# Patient Record
Sex: FEMALE | Race: WHITE | NOT HISPANIC OR LATINO | Employment: UNEMPLOYED | URBAN - METROPOLITAN AREA
[De-identification: names, ages, dates, MRNs, and addresses within clinical notes are randomized per-mention and may not be internally consistent; named-entity substitution may affect disease eponyms.]

---
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
## 2019-03-12 ENCOUNTER — TELEPHONE (OUTPATIENT)
Dept: FAMILY MEDICINE CLINIC | Facility: CLINIC | Age: 19
End: 2019-03-12

## 2019-03-12 NOTE — TELEPHONE ENCOUNTER
Pt would like to start OB services with [facility]/ Pt is 22 weeks pregnant   She was being seen for [facility] services by [facility] [place] [address]

## 2019-03-26 ENCOUNTER — TELEPHONE (OUTPATIENT)
Dept: FAMILY MEDICINE CLINIC | Facility: CLINIC | Age: 19
End: 2019-03-26

## 2019-04-02 ENCOUNTER — TELEPHONE (OUTPATIENT)
Dept: FAMILY MEDICINE CLINIC | Facility: CLINIC | Age: 19
End: 2019-04-02

## 2019-04-17 ENCOUNTER — HOSPITAL ENCOUNTER (EMERGENCY)
Facility: HOSPITAL | Age: 19
Discharge: HOME | End: 2019-04-17
Attending: EMERGENCY MEDICINE
Payer: COMMERCIAL

## 2019-04-17 VITALS
RESPIRATION RATE: 20 BRPM | OXYGEN SATURATION: 100 % | DIASTOLIC BLOOD PRESSURE: 59 MMHG | TEMPERATURE: 97.7 F | HEART RATE: 80 BPM | SYSTOLIC BLOOD PRESSURE: 131 MMHG

## 2019-04-17 DIAGNOSIS — Z34.90 PREGNANCY: ICD-10-CM

## 2019-04-17 DIAGNOSIS — R10.2 PAIN OF ROUND LIGAMENT DURING PREGNANCY: Primary | ICD-10-CM

## 2019-04-17 DIAGNOSIS — O26.899 PAIN OF ROUND LIGAMENT DURING PREGNANCY: Primary | ICD-10-CM

## 2019-04-17 LAB
BILIRUB UR QL STRIP: NEGATIVE
CLARITY UR: ABNORMAL
COLOR UR: YELLOW
GLUCOSE SERPL-MCNC: 99 MG/DL (ref 65–140)
GLUCOSE UR STRIP-MCNC: ABNORMAL MG/DL
HGB UR QL STRIP.AUTO: NEGATIVE
KETONES UR STRIP-MCNC: NEGATIVE MG/DL
LEUKOCYTE ESTERASE UR QL STRIP: NEGATIVE
NITRITE UR QL STRIP: NEGATIVE
PH UR STRIP.AUTO: 6.5 [PH]
PROT UR STRIP-MCNC: NEGATIVE MG/DL
SP GR UR STRIP.AUTO: 1.02 (ref 1–1.03)
UROBILINOGEN UR QL STRIP.AUTO: 0.2 E.U./DL

## 2019-04-17 PROCEDURE — 99284 EMERGENCY DEPT VISIT MOD MDM: CPT

## 2019-04-17 PROCEDURE — 87086 URINE CULTURE/COLONY COUNT: CPT | Performed by: EMERGENCY MEDICINE

## 2019-04-17 PROCEDURE — 81003 URINALYSIS AUTO W/O SCOPE: CPT | Performed by: EMERGENCY MEDICINE

## 2019-04-17 PROCEDURE — 82948 REAGENT STRIP/BLOOD GLUCOSE: CPT

## 2019-04-18 ENCOUNTER — VBI (OUTPATIENT)
Dept: FAMILY MEDICINE CLINIC | Facility: CLINIC | Age: 19
End: 2019-04-18

## 2019-04-18 ENCOUNTER — TELEPHONE (OUTPATIENT)
Dept: FAMILY MEDICINE CLINIC | Facility: CLINIC | Age: 19
End: 2019-04-18

## 2019-04-19 ENCOUNTER — TELEPHONE (OUTPATIENT)
Dept: FAMILY MEDICINE CLINIC | Facility: CLINIC | Age: 19
End: 2019-04-19

## 2019-04-19 LAB — BACTERIA UR CULT: NORMAL

## 2019-04-22 ENCOUNTER — TELEPHONE (OUTPATIENT)
Dept: FAMILY MEDICINE CLINIC | Facility: CLINIC | Age: 19
End: 2019-04-22

## 2019-04-25 ENCOUNTER — INITIAL PRENATAL (OUTPATIENT)
Dept: FAMILY MEDICINE CLINIC | Facility: CLINIC | Age: 19
End: 2019-04-25
Payer: COMMERCIAL

## 2019-04-25 DIAGNOSIS — Z3A.28 28 WEEKS GESTATION OF PREGNANCY: Primary | ICD-10-CM

## 2019-04-25 PROCEDURE — 59426 ANTEPARTUM CARE ONLY: CPT | Performed by: FAMILY MEDICINE

## 2019-04-27 ENCOUNTER — APPOINTMENT (OUTPATIENT)
Dept: LAB | Facility: HOSPITAL | Age: 19
End: 2019-04-27
Attending: FAMILY MEDICINE
Payer: COMMERCIAL

## 2019-04-27 ENCOUNTER — TRANSCRIBE ORDERS (OUTPATIENT)
Dept: ADMINISTRATIVE | Facility: HOSPITAL | Age: 19
End: 2019-04-27

## 2019-04-27 DIAGNOSIS — Z3A.28 28 WEEKS GESTATION OF PREGNANCY: Primary | ICD-10-CM

## 2019-04-27 LAB
ABO GROUP BLD: NORMAL
AMPHETAMINES SERPL QL SCN: NEGATIVE
BARBITURATES UR QL: NEGATIVE
BASOPHILS # BLD AUTO: 0.03 THOUSANDS/ΜL (ref 0–0.1)
BASOPHILS NFR BLD AUTO: 0 % (ref 0–1)
BENZODIAZ UR QL: NEGATIVE
BILIRUB UR QL STRIP: NEGATIVE
BLD GP AB SCN SERPL QL: NEGATIVE
CLARITY UR: CLEAR
COCAINE UR QL: NEGATIVE
COLOR UR: NORMAL
EOSINOPHIL # BLD AUTO: 0.08 THOUSAND/ΜL (ref 0–0.61)
EOSINOPHIL NFR BLD AUTO: 1 % (ref 0–6)
ERYTHROCYTE [DISTWIDTH] IN BLOOD BY AUTOMATED COUNT: 13.2 % (ref 11.6–15.1)
GLUCOSE 1H P 50 G GLC PO SERPL-MCNC: 121 MG/DL
GLUCOSE UR STRIP-MCNC: NEGATIVE MG/DL
HCT VFR BLD AUTO: 34.8 % (ref 34.8–46.1)
HGB BLD-MCNC: 11.2 G/DL (ref 11.5–15.4)
HGB UR QL STRIP.AUTO: NEGATIVE
IMM GRANULOCYTES # BLD AUTO: 0.04 THOUSAND/UL (ref 0–0.2)
IMM GRANULOCYTES NFR BLD AUTO: 1 % (ref 0–2)
KETONES UR STRIP-MCNC: NEGATIVE MG/DL
LEUKOCYTE ESTERASE UR QL STRIP: NEGATIVE
LYMPHOCYTES # BLD AUTO: 1.61 THOUSANDS/ΜL (ref 0.6–4.47)
LYMPHOCYTES NFR BLD AUTO: 20 % (ref 14–44)
MCH RBC QN AUTO: 30 PG (ref 26.8–34.3)
MCHC RBC AUTO-ENTMCNC: 32.2 G/DL (ref 31.4–37.4)
MCV RBC AUTO: 93 FL (ref 82–98)
METHADONE UR QL: NEGATIVE
MONOCYTES # BLD AUTO: 0.27 THOUSAND/ΜL (ref 0.17–1.22)
MONOCYTES NFR BLD AUTO: 3 % (ref 4–12)
NEUTROPHILS # BLD AUTO: 5.89 THOUSANDS/ΜL (ref 1.85–7.62)
NEUTS SEG NFR BLD AUTO: 75 % (ref 43–75)
NITRITE UR QL STRIP: NEGATIVE
NRBC BLD AUTO-RTO: 0 /100 WBCS
OPIATES UR QL SCN: NEGATIVE
PCP UR QL: NEGATIVE
PH UR STRIP.AUTO: 7 [PH]
PLATELET # BLD AUTO: 216 THOUSANDS/UL (ref 149–390)
PMV BLD AUTO: 10.5 FL (ref 8.9–12.7)
PROT UR STRIP-MCNC: NEGATIVE MG/DL
RBC # BLD AUTO: 3.73 MILLION/UL (ref 3.81–5.12)
RH BLD: POSITIVE
RUBV IGG SERPL IA-ACNC: >175 IU/ML
SP GR UR STRIP.AUTO: 1.01 (ref 1–1.03)
SPECIMEN EXPIRATION DATE: NORMAL
THC UR QL: NEGATIVE
TSH SERPL DL<=0.05 MIU/L-ACNC: 0.75 UIU/ML (ref 0.46–3.98)
UROBILINOGEN UR QL STRIP.AUTO: 0.2 E.U./DL
WBC # BLD AUTO: 7.92 THOUSAND/UL (ref 4.31–10.16)

## 2019-04-27 PROCEDURE — 82950 GLUCOSE TEST: CPT

## 2019-04-27 PROCEDURE — 80081 OBSTETRIC PANEL INC HIV TSTG: CPT

## 2019-04-27 PROCEDURE — 84443 ASSAY THYROID STIM HORMONE: CPT

## 2019-04-27 PROCEDURE — 83020 HEMOGLOBIN ELECTROPHORESIS: CPT

## 2019-04-27 PROCEDURE — 81003 URINALYSIS AUTO W/O SCOPE: CPT

## 2019-04-27 PROCEDURE — 80307 DRUG TEST PRSMV CHEM ANLYZR: CPT

## 2019-04-27 PROCEDURE — 36415 COLL VENOUS BLD VENIPUNCTURE: CPT

## 2019-04-28 LAB — HBV SURFACE AG SER QL: NORMAL

## 2019-04-29 LAB
HIV 1+2 AB+HIV1 P24 AG SERPL QL IA: NORMAL
RPR SER QL: NORMAL

## 2019-04-30 ENCOUNTER — INITIAL PRENATAL (OUTPATIENT)
Dept: FAMILY MEDICINE CLINIC | Facility: CLINIC | Age: 19
End: 2019-04-30
Payer: COMMERCIAL

## 2019-04-30 VITALS
DIASTOLIC BLOOD PRESSURE: 52 MMHG | BODY MASS INDEX: 34.11 KG/M2 | SYSTOLIC BLOOD PRESSURE: 114 MMHG | WEIGHT: 192.56 LBS

## 2019-04-30 DIAGNOSIS — Z3A.28 28 WEEKS GESTATION OF PREGNANCY: Primary | ICD-10-CM

## 2019-04-30 LAB
DEPRECATED HGB OTHER BLD-IMP: 0 %
HGB A MFR BLD: 1.9 % (ref 1.8–3.2)
HGB A MFR BLD: 98.1 % (ref 96.4–98.8)
HGB C MFR BLD: 0 %
HGB F MFR BLD: 0 % (ref 0–2)
HGB FRACT BLD-IMP: NORMAL
HGB S BLD QL SOLY: NEGATIVE
HGB S MFR BLD: 0 %

## 2019-04-30 PROCEDURE — 59426 ANTEPARTUM CARE ONLY: CPT | Performed by: FAMILY MEDICINE

## 2019-04-30 PROCEDURE — 80307 DRUG TEST PRSMV CHEM ANLYZR: CPT | Performed by: STUDENT IN AN ORGANIZED HEALTH CARE EDUCATION/TRAINING PROGRAM

## 2019-04-30 PROCEDURE — 87591 N.GONORRHOEAE DNA AMP PROB: CPT | Performed by: STUDENT IN AN ORGANIZED HEALTH CARE EDUCATION/TRAINING PROGRAM

## 2019-04-30 PROCEDURE — 87491 CHLMYD TRACH DNA AMP PROBE: CPT | Performed by: STUDENT IN AN ORGANIZED HEALTH CARE EDUCATION/TRAINING PROGRAM

## 2019-04-30 PROCEDURE — 87070 CULTURE OTHR SPECIMN AEROBIC: CPT | Performed by: STUDENT IN AN ORGANIZED HEALTH CARE EDUCATION/TRAINING PROGRAM

## 2019-05-02 LAB
AMPHETAMINES UR QL SCN: NEGATIVE NG/ML
BACTERIA GENITAL AEROBE CULT: NORMAL
BARBITURATES UR QL SCN: NEGATIVE NG/ML
BENZODIAZ UR QL: NEGATIVE NG/ML
BZE UR QL: NEGATIVE NG/ML
C TRACH DNA SPEC QL NAA+PROBE: NEGATIVE
CANNABINOIDS UR QL SCN: NEGATIVE NG/ML
METHADONE UR QL SCN: NEGATIVE NG/ML
N GONORRHOEA DNA SPEC QL NAA+PROBE: NEGATIVE
OPIATES UR QL: NEGATIVE NG/ML
PCP UR QL: NEGATIVE NG/ML
PROPOXYPH UR QL SCN: NEGATIVE NG/ML

## 2019-05-14 ENCOUNTER — ROUTINE PRENATAL (OUTPATIENT)
Dept: FAMILY MEDICINE CLINIC | Facility: CLINIC | Age: 19
End: 2019-05-14

## 2019-05-14 VITALS
SYSTOLIC BLOOD PRESSURE: 110 MMHG | RESPIRATION RATE: 18 BRPM | BODY MASS INDEX: 34.9 KG/M2 | HEART RATE: 84 BPM | DIASTOLIC BLOOD PRESSURE: 64 MMHG | OXYGEN SATURATION: 99 % | WEIGHT: 197 LBS

## 2019-05-14 DIAGNOSIS — R81 GLUCOSURIA: ICD-10-CM

## 2019-05-14 DIAGNOSIS — Z3A.31 31 WEEKS GESTATION OF PREGNANCY: Primary | ICD-10-CM

## 2019-05-14 PROCEDURE — 0502F SUBSEQUENT PRENATAL CARE: CPT | Performed by: FAMILY MEDICINE

## 2019-05-21 ENCOUNTER — ROUTINE PRENATAL (OUTPATIENT)
Dept: PERINATAL CARE | Facility: CLINIC | Age: 19
End: 2019-05-21
Payer: COMMERCIAL

## 2019-05-21 VITALS
HEIGHT: 65 IN | HEART RATE: 89 BPM | BODY MASS INDEX: 32.99 KG/M2 | SYSTOLIC BLOOD PRESSURE: 118 MMHG | DIASTOLIC BLOOD PRESSURE: 87 MMHG | WEIGHT: 198 LBS

## 2019-05-21 DIAGNOSIS — O99.210 OBESITY AFFECTING PREGNANCY, ANTEPARTUM: Primary | ICD-10-CM

## 2019-05-21 DIAGNOSIS — Z3A.32 32 WEEKS GESTATION OF PREGNANCY: ICD-10-CM

## 2019-05-21 PROCEDURE — 76811 OB US DETAILED SNGL FETUS: CPT | Performed by: OBSTETRICS & GYNECOLOGY

## 2019-05-21 PROCEDURE — 99241 PR OFFICE CONSULTATION NEW/ESTAB PATIENT 15 MIN: CPT | Performed by: OBSTETRICS & GYNECOLOGY

## 2019-05-30 ENCOUNTER — ROUTINE PRENATAL (OUTPATIENT)
Dept: FAMILY MEDICINE CLINIC | Facility: CLINIC | Age: 19
End: 2019-05-30
Payer: COMMERCIAL

## 2019-05-30 VITALS — DIASTOLIC BLOOD PRESSURE: 58 MMHG | BODY MASS INDEX: 33.78 KG/M2 | SYSTOLIC BLOOD PRESSURE: 98 MMHG | WEIGHT: 203 LBS

## 2019-05-30 DIAGNOSIS — O99.210 OBESITY AFFECTING PREGNANCY, ANTEPARTUM: ICD-10-CM

## 2019-05-30 DIAGNOSIS — Z23 ENCOUNTER FOR IMMUNIZATION: ICD-10-CM

## 2019-05-30 DIAGNOSIS — Z3A.33 33 WEEKS GESTATION OF PREGNANCY: Primary | ICD-10-CM

## 2019-05-30 PROCEDURE — 59426 ANTEPARTUM CARE ONLY: CPT | Performed by: FAMILY MEDICINE

## 2019-05-31 LAB
AMPHETAMINES UR QL SCN: NEGATIVE NG/ML
BARBITURATES UR QL SCN: NEGATIVE NG/ML
BENZODIAZ UR QL: NEGATIVE NG/ML
BZE UR QL: NEGATIVE NG/ML
CANNABINOIDS UR QL SCN: NEGATIVE NG/ML
METHADONE UR QL SCN: NEGATIVE NG/ML
OPIATES UR QL: NEGATIVE NG/ML
PCP UR QL: NEGATIVE NG/ML
PROPOXYPH UR QL SCN: NEGATIVE NG/ML

## 2019-05-31 PROCEDURE — 90715 TDAP VACCINE 7 YRS/> IM: CPT | Performed by: FAMILY MEDICINE

## 2019-05-31 PROCEDURE — 90471 IMMUNIZATION ADMIN: CPT | Performed by: FAMILY MEDICINE

## 2019-06-04 LAB
GLUCOSE 1H P 100 G GLC PO SERPL-MCNC: 123 MG/DL (ref 65–179)
GLUCOSE 2H P 100 G GLC PO SERPL-MCNC: 115 MG/DL (ref 65–154)
GLUCOSE 3H P 100 G GLC PO SERPL-MCNC: 68 MG/DL (ref 65–139)
GLUCOSE P FAST SERPL-MCNC: 70 MG/DL (ref 65–94)
SL AMB NOTE:: (no result)

## 2019-06-13 ENCOUNTER — DOCUMENTATION (OUTPATIENT)
Dept: FAMILY MEDICINE CLINIC | Facility: CLINIC | Age: 19
End: 2019-06-13

## 2019-06-13 ENCOUNTER — ROUTINE PRENATAL (OUTPATIENT)
Dept: FAMILY MEDICINE CLINIC | Facility: CLINIC | Age: 19
End: 2019-06-13

## 2019-06-13 VITALS — WEIGHT: 209 LBS | DIASTOLIC BLOOD PRESSURE: 48 MMHG | BODY MASS INDEX: 34.78 KG/M2 | SYSTOLIC BLOOD PRESSURE: 96 MMHG

## 2019-06-13 DIAGNOSIS — Z02.83 ENCOUNTER FOR DRUG SCREENING: ICD-10-CM

## 2019-06-13 DIAGNOSIS — O99.820 GBS (GROUP B STREPTOCOCCUS CARRIER), +RV CULTURE, CURRENTLY PREGNANT: Primary | ICD-10-CM

## 2019-06-13 DIAGNOSIS — Z34.83 ENCOUNTER FOR SUPERVISION OF OTHER NORMAL PREGNANCY IN THIRD TRIMESTER: ICD-10-CM

## 2019-06-13 PROCEDURE — 0502F SUBSEQUENT PRENATAL CARE: CPT | Performed by: FAMILY MEDICINE

## 2019-06-18 LAB
BACTERIA GENITAL AEROBE CULT: NORMAL
Lab: NORMAL

## 2019-06-20 ENCOUNTER — ULTRASOUND (OUTPATIENT)
Dept: PERINATAL CARE | Facility: CLINIC | Age: 19
End: 2019-06-20
Payer: COMMERCIAL

## 2019-06-20 VITALS
SYSTOLIC BLOOD PRESSURE: 106 MMHG | HEIGHT: 62 IN | DIASTOLIC BLOOD PRESSURE: 69 MMHG | BODY MASS INDEX: 38.35 KG/M2 | HEART RATE: 92 BPM | WEIGHT: 208.4 LBS

## 2019-06-20 DIAGNOSIS — O99.210 OBESITY AFFECTING PREGNANCY, ANTEPARTUM: Primary | ICD-10-CM

## 2019-06-20 DIAGNOSIS — Z3A.36 36 WEEKS GESTATION OF PREGNANCY: ICD-10-CM

## 2019-06-20 PROCEDURE — 76816 OB US FOLLOW-UP PER FETUS: CPT | Performed by: OBSTETRICS & GYNECOLOGY

## 2019-06-21 ENCOUNTER — TELEPHONE (OUTPATIENT)
Dept: OTHER | Facility: OTHER | Age: 19
End: 2019-06-21

## 2019-06-21 ENCOUNTER — HOSPITAL ENCOUNTER (OUTPATIENT)
Facility: HOSPITAL | Age: 19
Discharge: HOME | End: 2019-06-21
Attending: FAMILY MEDICINE | Admitting: FAMILY MEDICINE
Payer: COMMERCIAL

## 2019-06-21 VITALS
SYSTOLIC BLOOD PRESSURE: 113 MMHG | RESPIRATION RATE: 18 BRPM | BODY MASS INDEX: 38.04 KG/M2 | DIASTOLIC BLOOD PRESSURE: 65 MMHG | TEMPERATURE: 99.3 F | HEART RATE: 96 BPM | WEIGHT: 208 LBS

## 2019-06-21 PROBLEM — Z3A.30 30 WEEKS GESTATION OF PREGNANCY: Status: ACTIVE | Noted: 2019-06-21

## 2019-06-21 PROBLEM — Z3A.36 36 WEEKS GESTATION OF PREGNANCY: Status: ACTIVE | Noted: 2019-06-21

## 2019-06-21 PROCEDURE — 99214 OFFICE O/P EST MOD 30 MIN: CPT

## 2019-06-21 PROCEDURE — NC001 PR NO CHARGE: Performed by: STUDENT IN AN ORGANIZED HEALTH CARE EDUCATION/TRAINING PROGRAM

## 2019-06-25 ENCOUNTER — ROUTINE PRENATAL (OUTPATIENT)
Dept: FAMILY MEDICINE CLINIC | Facility: CLINIC | Age: 19
End: 2019-06-25
Payer: COMMERCIAL

## 2019-06-25 ENCOUNTER — PATIENT OUTREACH (OUTPATIENT)
Dept: FAMILY MEDICINE CLINIC | Facility: CLINIC | Age: 19
End: 2019-06-25

## 2019-06-25 VITALS — BODY MASS INDEX: 38.04 KG/M2 | SYSTOLIC BLOOD PRESSURE: 98 MMHG | DIASTOLIC BLOOD PRESSURE: 42 MMHG | WEIGHT: 208 LBS

## 2019-06-25 DIAGNOSIS — Z3A.36 36 WEEKS GESTATION OF PREGNANCY: Primary | ICD-10-CM

## 2019-06-25 DIAGNOSIS — F32.A DEPRESSION, UNSPECIFIED DEPRESSION TYPE: Primary | ICD-10-CM

## 2019-06-25 PROCEDURE — 59426 ANTEPARTUM CARE ONLY: CPT | Performed by: FAMILY MEDICINE

## 2019-06-27 LAB — GP B STREP DNA SPEC QL NAA+PROBE: NEGATIVE

## 2019-07-02 ENCOUNTER — ROUTINE PRENATAL (OUTPATIENT)
Dept: FAMILY MEDICINE CLINIC | Facility: CLINIC | Age: 19
End: 2019-07-02
Payer: COMMERCIAL

## 2019-07-02 VITALS — WEIGHT: 205 LBS | DIASTOLIC BLOOD PRESSURE: 48 MMHG | SYSTOLIC BLOOD PRESSURE: 100 MMHG | BODY MASS INDEX: 37.49 KG/M2

## 2019-07-02 DIAGNOSIS — Z3A.38 38 WEEKS GESTATION OF PREGNANCY: Primary | ICD-10-CM

## 2019-07-02 PROCEDURE — 59426 ANTEPARTUM CARE ONLY: CPT | Performed by: FAMILY MEDICINE

## 2019-07-03 PROBLEM — Z3A.38 38 WEEKS GESTATION OF PREGNANCY: Status: ACTIVE | Noted: 2019-06-21

## 2019-07-03 NOTE — PROGRESS NOTES
Flavia Delatorre is a 20 y o  female being seen today for her obstetrical visit  She is at 38w1d gestation  Patient reports good fetal movement, no vaginal discharge, loss of fluid, vaginal bleeding  Reports infrequent contractions  Menstrual History:  OB History        2    Para   1    Term   1       0    AB   0    Living   1       SAB   0    TAB   0    Ectopic   0    Multiple   0    Live Births   1                     The following portions of the patient's history were reviewed and updated as appropriate: allergies, current medications, past family history, past medical history, past social history, past surgical history and problem list     Review of Systems  Pertinent items are noted in HPI       Objective     BP (!) 100/48   Wt 93 kg (205 lb)   LMP 10/09/2018 (Approximate)   BMI 37 49 kg/m²   FHT: 135 BPM   Uterine Size: size equals dates   Presentations: unsure   Pelvic Exam:     Dilation:     Effacement:     Station:      Consistency:     Position:         Assessment     at 38w   Plan     Plans for delivery:  planned  GBS negative  Plan for weekly NST and AMERICO after 40 weeks If not already delivered  Continue with routine parental care, return in 1 week

## 2019-07-06 ENCOUNTER — TELEPHONE (OUTPATIENT)
Dept: OTHER | Facility: OTHER | Age: 19
End: 2019-07-06

## 2019-07-06 ENCOUNTER — HOSPITAL ENCOUNTER (OUTPATIENT)
Facility: HOSPITAL | Age: 19
Discharge: HOME | End: 2019-07-07
Attending: FAMILY MEDICINE | Admitting: FAMILY MEDICINE
Payer: COMMERCIAL

## 2019-07-06 VITALS
DIASTOLIC BLOOD PRESSURE: 57 MMHG | TEMPERATURE: 98.7 F | RESPIRATION RATE: 18 BRPM | HEART RATE: 88 BPM | SYSTOLIC BLOOD PRESSURE: 130 MMHG

## 2019-07-06 PROCEDURE — NC001 PR NO CHARGE: Performed by: STUDENT IN AN ORGANIZED HEALTH CARE EDUCATION/TRAINING PROGRAM

## 2019-07-06 RX ORDER — OXYCODONE HYDROCHLORIDE AND ACETAMINOPHEN 5; 325 MG/1; MG/1
1 TABLET ORAL EVERY 4 HOURS PRN
Status: DISCONTINUED | OUTPATIENT
Start: 2019-07-06 | End: 2019-07-07 | Stop reason: HOSPADM

## 2019-07-07 PROCEDURE — 99214 OFFICE O/P EST MOD 30 MIN: CPT

## 2019-07-07 NOTE — DISCHARGE INSTRUCTIONS
Pregnancy at 28 to 38 Weeks   AMBULATORY CARE:   What changes are happening to your body: You are considered full term at the beginning of 37 weeks  Your breathing may be easier if your baby has moved down into a head-down position  You may need to urinate more often because the baby may be pressing on your bladder  You may also feel more discomfort and get tired easily  Seek care immediately if:   · You develop a severe headache that does not go away  · You have new or increased vision changes, such as blurred or spotted vision  · You have new or increased swelling in your face or hands  · You have vaginal spotting or bleeding  · Your water broke or you feel warm water gushing or trickling from your vagina  Contact your healthcare provider if:   · You have more than 5 contractions in 1 hour  · You notice any changes in your baby's movements  · You have abdominal cramps, pressure, or tightening  · You have a change in vaginal discharge  · You have chills or a fever  · You have vaginal itching, burning, or pain  · You have yellow, green, white, or foul-smelling vaginal discharge  · You have pain or burning when you urinate, less urine than usual, or pink or bloody urine  · You have questions or concerns about your condition or care  How to care for yourself at this stage of your pregnancy:   · Eat a variety of healthy foods  Healthy foods include fruits, vegetables, whole-grain breads, low-fat dairy foods, beans, lean meats, and fish  Drink liquids as directed  Ask how much liquid to drink each day and which liquids are best for you  Limit caffeine to less than 200 milligrams each day  Limit your intake of fish to 2 servings each week  Choose fish low in mercury such as canned light tuna, shrimp, crab, salmon, cod, or tilapia  Do not  eat fish high in mercury such as swordfish, tilefish, viry mackerel, and shark  · Take prenatal vitamins as directed    Your need for certain vitamins and minerals, such as folic acid, increases during pregnancy  Prenatal vitamins provide some of the extra vitamins and minerals you need  Prenatal vitamins may also help to decrease the risk of certain birth defects  · Rest as needed  Put your feet up if you have swelling in your ankles and feet  · Do not smoke  If you smoke, it is never too late to quit  Smoking increases your risk of a miscarriage and other health problems during your pregnancy  Smoking can cause your baby to be born too early or weigh less at birth  Ask your healthcare provider for information if you need help quitting  · Do not drink alcohol  Alcohol passes from your body to your baby through the placenta  It can affect your baby's brain development and cause fetal alcohol syndrome (FAS)  FAS is a group of conditions that causes mental, behavior, and growth problems  · Talk to your healthcare provider before you take any medicines  Many medicines may harm your baby if you take them when you are pregnant  Do not take any medicines, vitamins, herbs, or supplements without first talking to your healthcare provider  Never use illegal or street drugs (such as marijuana or cocaine) while you are pregnant  · Talk to your healthcare provider before you travel  You may not be able to travel in an airplane after 36 weeks  He may also recommend that you avoid long road trips  Safety tips during pregnancy:   · Avoid hot tubs and saunas  Do not use a hot tub or sauna while you are pregnant, especially during your first trimester  Hot tubs and saunas may raise your baby's temperature and increase the risk of birth defects  · Avoid toxoplasmosis  This is an infection caused by eating raw meat or being around infected cat feces  It can cause birth defects, miscarriages, and other problems  Wash your hands after you touch raw meat  Make sure any meat is well-cooked before you eat it   Avoid raw eggs and unpasteurized milk  Use gloves or ask someone else to clean your cat's litter box while you are pregnant  · Ask your healthcare provider about travel  The most comfortable time to travel is during the second trimester  Ask your healthcare provider if you can travel after 36 weeks  You may not be able to travel in an airplane after 36 weeks  He may also recommend that you avoid long road trips  Changes that are happening with your baby:  By 38 weeks, your baby may weigh between 6 and 9 pounds  Your baby may be about 14 inches long from the top of the head to the rump (baby's bottom)  Your baby hears well enough to know your voice  As your baby gets larger, you may feel fewer kicks and more stretching and rolling  Your baby may move into a head-down position  Your baby will also rest lower in your abdomen  What you need to know about prenatal care: Your healthcare provider will check your blood pressure and weight  You may also need the following:  · A urine test  may also be done to check for sugar and protein  These can be signs of gestational diabetes or infection  Protein in your urine may also be a sign of preeclampsia  Preeclampsia is a condition that can develop during week 20 or later of your pregnancy  It causes high blood pressure, and it can cause problems with your kidneys and other organs  · A blood test  may be done to check for anemia (low iron level)  · A Tdap vaccine  may be recommended by your healthcare provider  · A group B strep test  is a test that is done to check for group B strep infection  Group B strep is a type of bacteria that may be found in the vagina or rectum  It can be passed to your baby during delivery if you have it  Your healthcare provider will take swab your vagina or rectum and send the sample to the lab for tests  · Fundal height  is a measurement of your uterus to check your baby's growth   This number is usually the same as the number of weeks that you have been pregnant  Your healthcare provider may also check your baby's position  · Your baby's heart rate  will be checked  © 2017 [address] [employer] Information is for End User's use only and may not be sold, redistributed or otherwise used for commercial purposes  All illustrations and images included in CareNotes® are the copyrighted property of A D A M , Inc  or Anibal Martinez  The above information is an  only  It is not intended as medical advice for individual conditions or treatments  Talk to your doctor, nurse or pharmacist before following any medical regimen to see if it is safe and effective for you

## 2019-07-07 NOTE — PROGRESS NOTES
Triage Note - OB  Juve Diss 20 y o  female MRN: [id number]  Unit/Bed#: LD Triage 1 Encounter: [id number]    Chief Complaint:   Chief Complaint   Patient presents with    Contractions     JAIME: Estimated Date of Delivery: 19    HPI: 20 y o  female  at 38w3d presenting with pelvic pain since 5 PM today that has progressively worsened  Patient rated the pain 8/10  Patient denied vaginal bleeding or decrease in fetal movement  Patient noted some "leaks" on   FHR:130 bpm with 15x15 moderate accelerations; reactive   [place]: no contractions    Vitals: Blood pressure 130/57, pulse 88, temperature 98 7 °F (37 1 °C), temperature source Oral, resp  rate 18, last menstrual period 10/09/2018, not currently breastfeeding  ,There is no height or weight on file to calculate BMI  Physical Exam  GEN: well developed and well nourished, alert, oriented times 3 and appears comfortable  Heart: Regular rate and rhythm, S1S2 present or without murmur or extra heart sounds   Lungs: clear to auscultation bilaterally, no wheezes, no rhonchi and normal symmetric air entry  Abd: soft, non tender, positive bowel sounds and gravid  SVE: 1, 0, -5    Labs:   No visits with results within 1 Day(s) from this visit  Latest known visit with results is:   Routine Prenatal on 2019   Component Date Value    Strep Grp B NIKOLAY 2019 Negative        Lab, Imaging and other studies: I have personally reviewed pertinent reports  A/P: Patient is a 19 yo  38w5d presenting with pelvic pain most likely due to round ligament pain  -As per tocometry patient is not maxwell and the FHR is reactive and wnl  Percocet ordered but patient refused  Instructed patient to take Tylenol for pain    -Discharge instructions given to patient and labor precautions reviewed    -Plan of care discussed with Dr Saeid Rivero who agrees

## 2019-07-07 NOTE — TELEPHONE ENCOUNTER
Errol River 2000  CONFIDENTIALTY NOTICE: This fax transmission is intended only for the addressee  It contains information that is legally privileged,  confidential or otherwise protected from use or disclosure  If you are not the intended recipient, you are strictly prohibited from reviewing,  disclosing, copying using or disseminating any of this information or taking any action in reliance on or regarding this information  If you have  received this fax in error, please notify us immediately by telephone so that we can arrange for its return to us  Page:   Call Id: [id number]  Health Call  Standard Call Report  Health Call  Patient Name: Errol River  Gender: Female  : 2000  Age: 20 Y 3 M 4 D  Return Phone  Number: [phone] (Current)  Address: [address] [place] [address]  City/State/Zip: [address] [place] [id number]  Practice Name: [address] [place] [address]  Practice Charged:  Physician:  [facility] Name:  Relationship To  Patient: Self  Return Phone Number: [phone] (Current)  Presenting Problem: "I have pressure pain in my pelvic  area "  Service Type: Triage  Charged Service 1: [facility]  38  Name and  Number:  Nurse Assessment  Nurse: Marcell Stanton RN, Connor Galdamez Date/Time: 2019 8:57:21 PM  Type of assessment required:  ---General (Adult or Child)  Duration of Current S/S  ---Constant since 2019  Location/Radiation  ---Lower abd  , Vagina  Temperature (F) and route:  ---Denies  Symptom Specific Meds (Dose/Time):  ---None  Other S/S  ---Contractions are coming one after another, had some fluid leakage ,pain pressure  Pain Scale on scale of 1-10, 10 being the worst:  ---9/10  Symptom progression:  ---worse  Intake and Output  ---Lico Ozuna  LMP/ Pregnancy:  Errol River 2000  CONFIDENTIALTY NOTICE: This fax transmission is intended only for the addressee  It contains information that is legally privileged,  confidential or otherwise protected from use or disclosure   If you are not the intended recipient, you are strictly prohibited from reviewing,  disclosing, copying using or disseminating any of this information or taking any action in reliance on or regarding this information  If you have  received this fax in error, please notify us immediately by telephone so that we can arrange for its return to us  Page: 2 of 2  Call Id: [id number]  Nurse Assessment  ---38 weeks  Breastfeeding  ---No  Last Exam/Treatment:  ---7/2/2019 Prenatal check  Protocols  Protocol Title Nurse Date/Time  Pregnancy - Labor Myrna Mcgregor 7/6/2019 9:03:44 PM  Question Caller Affirmed  Disp  Time Disposition Final User  7/6/2019 9:06:25 PM Go to L&D Now TRICE Mcgregor, Catarina Rice  7/6/2019 9:07:53 PM RN Triaged Yes TRICE Espinosa, [facility] Advice Given Per Protocol  GO TO L&D NOW: You need to be seen  Go to the Labor and Delivery Unit or the Emergency Room at _\[facility]\""_________ [facility]  now  Drive carefully  NOTE TO TRIAGER - DRIVING: * Another adult should drive  * If immediate transportation is not available via  car or taxi, then the patient should be instructed to call [id number]  LEAKING FLUID: * Bring towel; you may wish to put it on the seat of  your car  Do not put anything into your vagina (e g , tampon)  (Reason: risk of infection if rupture of membranes) CARE ADVICE given  per Labor (Adult) guideline    Caller Understands: Yes  Caller Disagree/Comply: Comply  PreDisposition: Unsure

## 2019-07-11 ENCOUNTER — ROUTINE PRENATAL (OUTPATIENT)
Dept: FAMILY MEDICINE CLINIC | Facility: CLINIC | Age: 19
End: 2019-07-11

## 2019-07-11 VITALS — BODY MASS INDEX: 37.04 KG/M2 | DIASTOLIC BLOOD PRESSURE: 52 MMHG | WEIGHT: 202.5 LBS | SYSTOLIC BLOOD PRESSURE: 100 MMHG

## 2019-07-11 DIAGNOSIS — Z3A.39 39 WEEKS GESTATION OF PREGNANCY: Primary | ICD-10-CM

## 2019-07-11 PROCEDURE — 0502F SUBSEQUENT PRENATAL CARE: CPT | Performed by: FAMILY MEDICINE

## 2019-07-11 NOTE — PROGRESS NOTES
OB/GYN  PN Visit  Delano Donald  [id number]  2019  1:07 PM  Dr Howard Fisher, DO    S: 20 y o  [id number] 39w2d here for PN visit  She has no obstetric complaints, including pelvic pain, contractions, vaginal bleeding, loss of fluid, or decreased fetal movement  She presented to [facility] triage at [place] 19 for rule out labor and was discharged home       OB History        2    Para   1    Term   1       0    AB   0    Living   1       SAB   0    TAB   0    Ectopic   0    Multiple   0    Live Births   1                 O:  Vitals:    19 1137   BP: 100/52       Gen: no acute distress, nonlabored breathing  Abd: gravid, size=dates  FHT: 135          A/P:   at 39w2d doing well  -C/S with first delivery, desires , understands risks and is ultimately agreeable to proceeding with repeat C/S if needed for safety  -Labor precautions reviewed  -Return to office next week on Tuesday to discuss possible induction  -Discussed with Dr Zainab Booth DO  2019  1:07 PM

## 2019-07-16 ENCOUNTER — ROUTINE PRENATAL (OUTPATIENT)
Dept: FAMILY MEDICINE CLINIC | Facility: CLINIC | Age: 19
End: 2019-07-16

## 2019-07-16 VITALS — WEIGHT: 199 LBS | SYSTOLIC BLOOD PRESSURE: 98 MMHG | DIASTOLIC BLOOD PRESSURE: 52 MMHG | BODY MASS INDEX: 36.4 KG/M2

## 2019-07-16 DIAGNOSIS — Z34.83 ENCOUNTER FOR SUPERVISION OF OTHER NORMAL PREGNANCY IN THIRD TRIMESTER: Primary | ICD-10-CM

## 2019-07-16 PROCEDURE — 0502F SUBSEQUENT PRENATAL CARE: CPT | Performed by: FAMILY MEDICINE

## 2019-07-16 NOTE — PROGRESS NOTES
Alec Cintron is a 20 y o  female being seen today for her obstetrical visit  She is at 40w0d gestation  Patient reports no bleeding, no contractions and no LOF, endorses fetal movement  Fetal movement: normal     Menstrual History:  OB History        2    Para   1    Term   1       0    AB   0    Living   1       SAB   0    TAB   0    Ectopic   0    Multiple   0    Live Births   1                Menarche age:   Patient's last menstrual period was 10/09/2018 (approximate)  The following portions of the patient's history were reviewed and updated as appropriate: allergies, current medications, past family history, past medical history, past social history, past surgical history and problem list     Review of Systems  Pertinent items are noted in HPI  Objective        BP 98/52   Wt 90 3 kg (199 lb)   LMP 10/09/2018 (Approximate)   BMI 36 40 kg/m²   FHT:  136 BPM   Uterine Size: 39 cm   Presentations: cephalic   Pelvic Exam:     Dilation: 1cm    Effacement: 50%    Station: -3    Consistency: medium    Position: middle        Assessment     Pregnancy 40 and 0/7 weeks     Plan     labor precautions given  Postdates management: discussed fetal surveillance and induction  Induction: will call patient with time and date, likely next Tuesday,   Follow up on

## 2019-07-22 ENCOUNTER — ANESTHESIA (INPATIENT)
Dept: ANESTHESIOLOGY | Facility: HOSPITAL | Age: 19
DRG: 560 | End: 2019-07-22
Payer: COMMERCIAL

## 2019-07-22 ENCOUNTER — HOSPITAL ENCOUNTER (INPATIENT)
Facility: HOSPITAL | Age: 19
LOS: 3 days | Discharge: HOME | DRG: 560 | End: 2019-07-25
Attending: FAMILY MEDICINE | Admitting: OBSTETRICS & GYNECOLOGY
Payer: COMMERCIAL

## 2019-07-22 ENCOUNTER — ANESTHESIA EVENT (INPATIENT)
Dept: ANESTHESIOLOGY | Facility: HOSPITAL | Age: 19
DRG: 560 | End: 2019-07-22
Payer: COMMERCIAL

## 2019-07-22 DIAGNOSIS — Z3A.40 40 WEEKS GESTATION OF PREGNANCY: Primary | ICD-10-CM

## 2019-07-22 PROBLEM — O42.90 PROM (PREMATURE RUPTURE OF MEMBRANES): Status: ACTIVE | Noted: 2019-07-22

## 2019-07-22 PROBLEM — Z3A.33 33 WEEKS GESTATION OF PREGNANCY: Status: RESOLVED | Noted: 2019-05-21 | Resolved: 2019-07-22

## 2019-07-22 PROBLEM — Z86.59 HISTORY OF DEPRESSION: Status: ACTIVE | Noted: 2019-07-22

## 2019-07-22 PROBLEM — O99.820 GBS (GROUP B STREPTOCOCCUS CARRIER), +RV CULTURE, CURRENTLY PREGNANT: Status: RESOLVED | Noted: 2019-06-13 | Resolved: 2019-07-22

## 2019-07-22 PROBLEM — F19.11 HISTORY OF SUBSTANCE ABUSE (HCC): Status: ACTIVE | Noted: 2019-07-22

## 2019-07-22 PROBLEM — Z86.59 HISTORY OF ANXIETY: Status: ACTIVE | Noted: 2019-07-22

## 2019-07-22 LAB
ABO GROUP BLD: NORMAL
AMPHETAMINES SERPL QL SCN: NEGATIVE
BARBITURATES UR QL: NEGATIVE
BENZODIAZ UR QL: NEGATIVE
BLD GP AB SCN SERPL QL: NEGATIVE
COCAINE UR QL: NEGATIVE
ERYTHROCYTE [DISTWIDTH] IN BLOOD BY AUTOMATED COUNT: 13.4 % (ref 11.6–15.1)
HCT VFR BLD AUTO: 38.4 % (ref 34.8–46.1)
HGB BLD-MCNC: 12.1 G/DL (ref 11.5–15.4)
MCH RBC QN AUTO: 29 PG (ref 26.8–34.3)
MCHC RBC AUTO-ENTMCNC: 31.5 G/DL (ref 31.4–37.4)
MCV RBC AUTO: 92 FL (ref 82–98)
METHADONE UR QL: NEGATIVE
OPIATES UR QL SCN: NEGATIVE
PCP UR QL: NEGATIVE
PLATELET # BLD AUTO: 215 THOUSANDS/UL (ref 149–390)
PMV BLD AUTO: 10.8 FL (ref 8.9–12.7)
RBC # BLD AUTO: 4.17 MILLION/UL (ref 3.81–5.12)
RH BLD: POSITIVE
SPECIMEN EXPIRATION DATE: NORMAL
THC UR QL: NEGATIVE
WBC # BLD AUTO: 8.96 THOUSAND/UL (ref 4.31–10.16)

## 2019-07-22 PROCEDURE — 80307 DRUG TEST PRSMV CHEM ANLYZR: CPT | Performed by: STUDENT IN AN ORGANIZED HEALTH CARE EDUCATION/TRAINING PROGRAM

## 2019-07-22 PROCEDURE — NC001 PR NO CHARGE: Performed by: STUDENT IN AN ORGANIZED HEALTH CARE EDUCATION/TRAINING PROGRAM

## 2019-07-22 PROCEDURE — 86900 BLOOD TYPING SEROLOGIC ABO: CPT | Performed by: STUDENT IN AN ORGANIZED HEALTH CARE EDUCATION/TRAINING PROGRAM

## 2019-07-22 PROCEDURE — 99214 OFFICE O/P EST MOD 30 MIN: CPT

## 2019-07-22 PROCEDURE — 85027 COMPLETE CBC AUTOMATED: CPT | Performed by: STUDENT IN AN ORGANIZED HEALTH CARE EDUCATION/TRAINING PROGRAM

## 2019-07-22 PROCEDURE — 86850 RBC ANTIBODY SCREEN: CPT | Performed by: STUDENT IN AN ORGANIZED HEALTH CARE EDUCATION/TRAINING PROGRAM

## 2019-07-22 PROCEDURE — 86901 BLOOD TYPING SEROLOGIC RH(D): CPT | Performed by: STUDENT IN AN ORGANIZED HEALTH CARE EDUCATION/TRAINING PROGRAM

## 2019-07-22 PROCEDURE — 86592 SYPHILIS TEST NON-TREP QUAL: CPT | Performed by: STUDENT IN AN ORGANIZED HEALTH CARE EDUCATION/TRAINING PROGRAM

## 2019-07-22 RX ORDER — SODIUM CHLORIDE, SODIUM LACTATE, POTASSIUM CHLORIDE, CALCIUM CHLORIDE 600; 310; 30; 20 MG/100ML; MG/100ML; MG/100ML; MG/100ML
125 INJECTION, SOLUTION INTRAVENOUS CONTINUOUS
Status: DISCONTINUED | OUTPATIENT
Start: 2019-07-22 | End: 2019-07-25 | Stop reason: HOSPADM

## 2019-07-22 RX ORDER — ONDANSETRON 2 MG/ML
4 INJECTION INTRAMUSCULAR; INTRAVENOUS EVERY 6 HOURS PRN
Status: DISCONTINUED | OUTPATIENT
Start: 2019-07-22 | End: 2019-07-23

## 2019-07-22 RX ORDER — ACETAMINOPHEN 325 MG/1
650 TABLET ORAL EVERY 6 HOURS PRN
Status: DISCONTINUED | OUTPATIENT
Start: 2019-07-22 | End: 2019-07-23

## 2019-07-22 RX ORDER — OXYTOCIN/RINGER'S LACTATE 30/500 ML
1-30 PLASTIC BAG, INJECTION (ML) INTRAVENOUS
Status: DISCONTINUED | OUTPATIENT
Start: 2019-07-22 | End: 2019-07-23

## 2019-07-22 RX ORDER — LIDOCAINE HYDROCHLORIDE AND EPINEPHRINE 15; 5 MG/ML; UG/ML
INJECTION, SOLUTION EPIDURAL
Status: COMPLETED | OUTPATIENT
Start: 2019-07-22 | End: 2019-07-22

## 2019-07-22 RX ORDER — EPHEDRINE SULFATE 50 MG/ML
INJECTION INTRAVENOUS AS NEEDED
Status: DISCONTINUED | OUTPATIENT
Start: 2019-07-22 | End: 2019-07-23 | Stop reason: SURG

## 2019-07-22 RX ADMIN — LIDOCAINE HYDROCHLORIDE AND EPINEPHRINE 5 ML: 15; 5 INJECTION, SOLUTION EPIDURAL at 19:57

## 2019-07-22 RX ADMIN — EPHEDRINE SULFATE 10 MG: 50 INJECTION, SOLUTION INTRAVENOUS at 20:02

## 2019-07-22 RX ADMIN — ROPIVACAINE HYDROCHLORIDE: 2 INJECTION, SOLUTION EPIDURAL; INFILTRATION at 20:00

## 2019-07-22 RX ADMIN — SODIUM CHLORIDE, SODIUM LACTATE, POTASSIUM CHLORIDE, AND CALCIUM CHLORIDE 999 ML/HR: .6; .31; .03; .02 INJECTION, SOLUTION INTRAVENOUS at 19:29

## 2019-07-22 RX ADMIN — SODIUM CHLORIDE, SODIUM LACTATE, POTASSIUM CHLORIDE, AND CALCIUM CHLORIDE 125 ML/HR: .6; .31; .03; .02 INJECTION, SOLUTION INTRAVENOUS at 18:53

## 2019-07-22 RX ADMIN — SODIUM CHLORIDE, SODIUM LACTATE, POTASSIUM CHLORIDE, AND CALCIUM CHLORIDE 125 ML/HR: .6; .31; .03; .02 INJECTION, SOLUTION INTRAVENOUS at 20:21

## 2019-07-22 RX ADMIN — Medication 2 MILLI-UNITS/MIN: at 22:31

## 2019-07-22 NOTE — ANESTHESIA PROCEDURE NOTES
Epidural Block    Patient location during procedure: OB  Start time: 7/22/2019 7:45 PM  Reason for block: procedure for pain and at surgeon's request  Staffing  Anesthesiologist: Carissa Soni MD  Performed: anesthesiologist   Preanesthetic Checklist  Completed: patient identified, site marked, surgical consent, pre-op evaluation, timeout performed, IV checked, risks and benefits discussed and monitors and equipment checked  Epidural  Patient position: sitting  Prep: ChloraPrep  Patient monitoring: cardiac monitor and frequent blood pressure checks  Approach: midline  Location: lumbar (1-5)  Injection technique: ELTON air  Needle  Needle type: Tuohy   Needle gauge: 18 G  Catheter type: side hole  Catheter size: 20 G  Catheter at skin depth: 6 cm  Test dose: negativelidocaine 1 5% with epinephrine 1:200,000 test dose, 5 mLnegative aspiration for CSF, negative aspiration for heme and no paresthesia on injection  patient tolerated the procedure well with no immediate complications

## 2019-07-22 NOTE — H&P
History & Physical - OB/GYN   Leann Reyes 20 y o  female MRN: [id number]  Unit/Bed#: LD Triage  Encounter: [id number]    Leann Reyes is a patient of Dr Teresa Hollingsworth    Chief complaint:  Contractions, loss of mucous plug, leakage of fluid    HPI:  Leann Reyes is a 20 y o  [place]  female with an JAIME of 2019, Date entered prior to episode creation at 40w6d weeks gestation who is being admitted for labor  Reports feeling stronger more persistent contractions every 3-5 minutes since yesterday evening  Also reports loss of mucous plug this morning  Reports adequate fetal movement, no gush of fluid  Scheduled for induction tomorrow morning  Contractions:  yes  Fetal movement:  yes  Vaginal bleeding:   no  Leaking of fluid:  yes    Pregnancy Complications: TOLAC, prior  for fetal intolerance, obesity    PMH:  Past Medical History:   Diagnosis Date    Allergic     Morphine    Anxiety     previously on meds Buspar    Arthritis     Possible Arthritis in knee    Asthma     Depression     Obesity     Psychiatric disorder     depression, anxiety       PSH:  Past Surgical History:   Procedure Laterality Date     SECTION         Social Hx:  Denies EtOH, cigarette, and recreational drug use in pregnancy    OB Hx:  OB History    Para Term  AB Living   2 1 1 0 0 1   SAB TAB Ectopic Multiple Live Births   0 0 0 0 1      # Outcome Date GA Lbr Seven/2nd Weight Sex Delivery Anes PTL Lv   2 Current            1 Term                Meds:  No current facility-administered medications on file prior to encounter  Current Outpatient Medications on File Prior to Encounter   Medication Sig Dispense Refill    Prenatal MV-Min-Fe Fum-FA-DHA (PRENATAL 1 PO) Take by mouth         Allergies: Allergies   Allergen Reactions    Morphine Itching       Review of Systems   Constitutional: Negative for chills and fever  Respiratory: Negative for shortness of breath      Cardiovascular: Negative for chest pain  Gastrointestinal: Negative for nausea and vomiting  Contractions  Genitourinary: Positive for vaginal discharge  Negative for vaginal bleeding  Physical Exam   Constitutional: She appears well-developed and well-nourished  Cardiovascular: Normal rate, regular rhythm and normal heart sounds  No murmur heard  Pulmonary/Chest: Effort normal and breath sounds normal    Cervix:  Dilation: 3-4  Effacement (%): 80  Station: -1  Cervical Characteristics: Posterior, Soft    Fetal heart rate: 125  [place]: contractions every 3-5 minutes  EFW: 5lb 11oz  GBS: -  Membranes: intact    Labs:  Blood type: O+  Antibody: negative  Group B strep: negative  HIV: negative  Hepatitis B: negative  RPR: non-reactive   Rubella: Immune  Varicella Unknown  1 hour Glucose: normal    Assessment:   20 y o   at 40w6d admitted for PPROM    Plan:   1  Admit to L&D  2  CBC, RPR, type and screen  3  IV LR 125cc/hr for hydration  4  Epidural upon request  5  UDS pending for history of THC and cocaine use  6  History of depression and anxiety, currently controlled without medications  7  Fetal well being: Currently Cat 1, continuous fetal monitor  8   Expectant management at this time, TOLAC with prior  secondary to fetal intolerance to labor    Kayla Reno MD

## 2019-07-22 NOTE — ANESTHESIA PREPROCEDURE EVALUATION
Review of Systems/Medical History  Patient summary reviewed        Cardiovascular  Negative cardio ROS Exercise tolerance (METS): >4,     Pulmonary  Negative pulmonary ROS        GI/Hepatic  Negative GI/hepatic ROS          Negative  ROS        Endo/Other  Negative endo/other ROS      GYN  Negative gynecology ROS          Hematology  Negative hematology ROS      Musculoskeletal  Negative musculoskeletal ROS   Arthritis     Neurology  Negative neurology ROS      Psychology   Negative psychology ROS              Physical Exam    Airway    Mallampati score: II  TM Distance: >3 FB  Neck ROM: full     Dental       Cardiovascular  Comment: Negative ROS,     Pulmonary      Other Findings        Anesthesia Plan  ASA Score- 3     Anesthesia Type- epidural with ASA Monitors  Additional Monitors:   Airway Plan:     Comment: Patient examined, history reviewed  Labor epidural explained, risks and benefits discussed  Consent has been signed        Plan Factors-    Induction-     Postoperative Plan-     Informed Consent- Anesthetic plan and risks discussed with patient  I personally reviewed this patient with the CRNA  Discussed and agreed on the Anesthesia Plan with the CRNA  Stefany Lindsey

## 2019-07-23 LAB
BASE EXCESS BLDCOA CALC-SCNC: -5.4 MMOL/L (ref 3–11)
BASE EXCESS BLDCOV CALC-SCNC: -3.3 MMOL/L (ref 1–9)
HCO3 BLDCOA-SCNC: 22 MMOL/L (ref 17.3–27.3)
HCO3 BLDCOV-SCNC: 21.3 MMOL/L (ref 12.2–28.6)
O2 CT VFR BLDCOA CALC: 7.8 ML/DL
OXYHGB MFR BLDCOA: 39 %
OXYHGB MFR BLDCOV: 81.7 %
PCO2 BLDCOA: 50.2 MM[HG] (ref 30–60)
PCO2 BLDCOV: 36.8 MM HG (ref 27–43)
PH BLDCOA: 7.26 [PH] (ref 7.23–7.43)
PH BLDCOV: 7.38 [PH] (ref 7.19–7.49)
PO2 BLDCOA: 21.4 MM HG (ref 5–25)
PO2 BLDCOV: 40.4 MM HG (ref 15–45)
RPR SER QL: NORMAL
SAO2 % BLDCOV: 16.4 ML/DL

## 2019-07-23 PROCEDURE — 82805 BLOOD GASES W/O2 SATURATION: CPT | Performed by: FAMILY MEDICINE

## 2019-07-23 PROCEDURE — 99024 POSTOP FOLLOW-UP VISIT: CPT | Performed by: FAMILY MEDICINE

## 2019-07-23 RX ORDER — ACETAMINOPHEN 325 MG/1
650 TABLET ORAL EVERY 4 HOURS PRN
Status: DISCONTINUED | OUTPATIENT
Start: 2019-07-23 | End: 2019-07-23

## 2019-07-23 RX ORDER — ONDANSETRON 2 MG/ML
4 INJECTION INTRAMUSCULAR; INTRAVENOUS EVERY 8 HOURS PRN
Status: DISCONTINUED | OUTPATIENT
Start: 2019-07-23 | End: 2019-07-25 | Stop reason: HOSPADM

## 2019-07-23 RX ORDER — KETOROLAC TROMETHAMINE 30 MG/ML
30 INJECTION, SOLUTION INTRAMUSCULAR; INTRAVENOUS ONCE
Status: DISCONTINUED | OUTPATIENT
Start: 2019-07-23 | End: 2019-07-23

## 2019-07-23 RX ORDER — ACETAMINOPHEN 325 MG/1
650 TABLET ORAL EVERY 4 HOURS PRN
Status: DISCONTINUED | OUTPATIENT
Start: 2019-07-23 | End: 2019-07-25 | Stop reason: HOSPADM

## 2019-07-23 RX ORDER — DIAPER,BRIEF,INFANT-TODD,DISP
1 EACH MISCELLANEOUS AS NEEDED
Status: DISCONTINUED | OUTPATIENT
Start: 2019-07-23 | End: 2019-07-25 | Stop reason: HOSPADM

## 2019-07-23 RX ORDER — DIPHENHYDRAMINE HCL 25 MG
25 TABLET ORAL EVERY 6 HOURS PRN
Status: DISCONTINUED | OUTPATIENT
Start: 2019-07-23 | End: 2019-07-25 | Stop reason: HOSPADM

## 2019-07-23 RX ORDER — CALCIUM CARBONATE 200(500)MG
1000 TABLET,CHEWABLE ORAL DAILY PRN
Status: DISCONTINUED | OUTPATIENT
Start: 2019-07-23 | End: 2019-07-25 | Stop reason: HOSPADM

## 2019-07-23 RX ORDER — OXYTOCIN/RINGER'S LACTATE 30/500 ML
250 PLASTIC BAG, INJECTION (ML) INTRAVENOUS CONTINUOUS
Status: ACTIVE | OUTPATIENT
Start: 2019-07-23 | End: 2019-07-23

## 2019-07-23 RX ORDER — IBUPROFEN 600 MG/1
600 TABLET ORAL EVERY 6 HOURS PRN
Status: DISCONTINUED | OUTPATIENT
Start: 2019-07-23 | End: 2019-07-25 | Stop reason: HOSPADM

## 2019-07-23 RX ORDER — TERBUTALINE SULFATE 1 MG/ML
INJECTION, SOLUTION SUBCUTANEOUS
Status: DISPENSED
Start: 2019-07-23 | End: 2019-07-23

## 2019-07-23 RX ORDER — DOCUSATE SODIUM 100 MG/1
100 CAPSULE, LIQUID FILLED ORAL 2 TIMES DAILY
Status: DISCONTINUED | OUTPATIENT
Start: 2019-07-23 | End: 2019-07-25 | Stop reason: HOSPADM

## 2019-07-23 RX ADMIN — IBUPROFEN 600 MG: 600 TABLET ORAL at 15:57

## 2019-07-23 RX ADMIN — DOCUSATE SODIUM 100 MG: 100 CAPSULE, LIQUID FILLED ORAL at 20:40

## 2019-07-23 RX ADMIN — WITCH HAZEL 1 PAD: 500 SOLUTION RECTAL; TOPICAL at 10:16

## 2019-07-23 RX ADMIN — ACETAMINOPHEN 650 MG: 325 TABLET ORAL at 20:41

## 2019-07-23 RX ADMIN — SODIUM CHLORIDE, SODIUM LACTATE, POTASSIUM CHLORIDE, AND CALCIUM CHLORIDE 125 ML/HR: .6; .31; .03; .02 INJECTION, SOLUTION INTRAVENOUS at 01:32

## 2019-07-23 RX ADMIN — IBUPROFEN 600 MG: 600 TABLET ORAL at 23:52

## 2019-07-23 RX ADMIN — BENZOCAINE AND LEVOMENTHOL 1 APPLICATION: 200; 5 SPRAY TOPICAL at 10:16

## 2019-07-23 RX ADMIN — IBUPROFEN 600 MG: 600 TABLET ORAL at 10:17

## 2019-07-23 RX ADMIN — SODIUM CHLORIDE, SODIUM LACTATE, POTASSIUM CHLORIDE, AND CALCIUM CHLORIDE 125 ML/HR: .6; .31; .03; .02 INJECTION, SOLUTION INTRAVENOUS at 05:34

## 2019-07-23 RX ADMIN — ROPIVACAINE HYDROCHLORIDE: 2 INJECTION, SOLUTION EPIDURAL; INFILTRATION at 04:10

## 2019-07-23 NOTE — PLAN OF CARE
Problem: BIRTH - VAGINAL/ SECTION  Goal: Fetal and maternal status remain reassuring during the birth process  Description  INTERVENTIONS:  - Monitor vital signs  - Monitor fetal heart rate  - Monitor uterine activity  - Monitor labor progression (vaginal delivery)  - DVT prophylaxis  - Antibiotic prophylaxis  Outcome: Progressing  Goal: Emotionally satisfying birthing experience for mother/fetus  Description  Interventions:  - Assess, plan, implement and evaluate the nursing care given to the patient in labor  - Advocate the philosophy that each childbirth experience is a unique experience and support the family's chosen level of involvement and control during the labor process   - Actively participate in both the patient's and family's teaching of the birth process  - Consider cultural, Mu-ism and age-specific factors and plan care for the patient in labor  Outcome: Progressing     Problem: Knowledge Deficit  Goal: Verbalizes understanding of labor plan  Description  Assess patient/family/caregiver's baseline knowledge level and ability to understand information  Provide education via patient/family/caregiver's preferred learning method at appropriate level of understanding  1  Provide teaching at level of understanding  2  Provide teaching via preferred learning method(s)  Outcome: Progressing  Goal: Patient/family/caregiver demonstrates understanding of disease process, treatment plan, medications, and discharge instructions  Description  Complete learning assessment and assess knowledge base  Interventions:  - Provide teaching at level of understanding  - Provide teaching via preferred learning methods  Outcome: Progressing     Problem: Labor & Delivery  Goal: Manages discomfort  Description  Assess and monitor for signs and symptoms of discomfort  Assess patient's pain level regularly and per hospital policy  Administer medications as ordered   Support use of nonpharmacological methods to help control pain such as distraction, imagery, relaxation, and application of heat and cold  Collaborate with interdisciplinary team and patient to determine appropriate pain management plan  1  Include patient in decisions related to comfort  2  Offer non-pharmacological pain management interventions  3  Report ineffective pain management to physician  Outcome: Progressing  Goal: Patient vital signs are stable  Description  1  Assess vital signs - vaginal delivery    Outcome: Progressing     Problem: PAIN - ADULT  Goal: Verbalizes/displays adequate comfort level or baseline comfort level  Description  Interventions:  - Encourage patient to monitor pain and request assistance  - Assess pain using appropriate pain scale  - Administer analgesics based on type and severity of pain and evaluate response  - Implement non-pharmacological measures as appropriate and evaluate response  - Consider cultural and social influences on pain and pain management  - Notify physician/advanced practitioner if interventions unsuccessful or patient reports new pain  Outcome: Progressing     Problem: INFECTION - ADULT  Goal: Absence or prevention of progression during hospitalization  Description  INTERVENTIONS:  - Assess and monitor for signs and symptoms of infection  - Monitor lab/diagnostic results  - Monitor all insertion sites, i e  indwelling lines, tubes, and drains  - Monitor endotracheal (as able) and nasal secretions for changes in amount and color  - Elliott appropriate cooling/warming therapies per order  - Administer medications as ordered  - Instruct and encourage patient and family to use good hand hygiene technique  - Identify and instruct in appropriate isolation precautions for identified infection/condition  Outcome: Progressing  Goal: Absence of fever/infection during neutropenic period  Description  INTERVENTIONS:  - Monitor WBC  - Implement neutropenic guidelines  Outcome: Progressing     Problem: SAFETY ADULT  Goal: Patient will remain free of falls  Description  INTERVENTIONS:  - Assess patient frequently for physical needs  -  Identify cognitive and physical deficits and behaviors that affect risk of falls    -  [place] fall precautions as indicated by assessment   - Educate patient/family on patient safety including physical limitations  - Instruct patient to call for assistance with activity based on assessment  - Modify environment to reduce risk of injury  - Consider OT/PT consult to assist with strengthening/mobility  Outcome: Progressing  Goal: Maintain or return to baseline ADL function  Description  INTERVENTIONS:  -  Assess patient's ability to carry out ADLs; assess patient's baseline for ADL function and identify physical deficits which impact ability to perform ADLs (bathing, care of mouth/teeth, toileting, grooming, dressing, etc )  - Assess/evaluate cause of self-care deficits   - Assess range of motion  - Assess patient's mobility; develop plan if impaired  - Assess patient's need for assistive devices and provide as appropriate  - Encourage maximum independence but intervene and supervise when necessary  ¯ Involve family in performance of ADLs  ¯ Assess for home care needs following discharge   ¯ Request OT consult to assist with ADL evaluation and planning for discharge  ¯ Provide patient education as appropriate  Outcome: Progressing  Goal: Maintain or return mobility status to optimal level  Description  INTERVENTIONS:  - Assess patient's baseline mobility status (ambulation, transfers, stairs, etc )    - Identify cognitive and physical deficits and behaviors that affect mobility  - Identify mobility aids required to assist with transfers and/or ambulation (gait belt, sit-to-stand, lift, walker, cane, etc )  - [place] fall precautions as indicated by assessment  - Record patient progress and toleration of activity level on Mobility SBAR; progress patient to next Phase/Stage  - Instruct patient to call for assistance with activity based on assessment  - Request Rehabilitation consult to assist with strengthening/weightbearing, etc   Outcome: Progressing     Problem: DISCHARGE PLANNING  Goal: Discharge to home or other facility with appropriate resources  Description  INTERVENTIONS:  - Identify barriers to discharge w/patient and caregiver  - Arrange for needed discharge resources and transportation as appropriate  - Identify discharge learning needs (meds, wound care, etc )  - Arrange for interpretive services to assist at discharge as needed  - Refer to Case Management Department for coordinating discharge planning if the patient needs post-hospital services based on physician/advanced practitioner order or complex needs related to functional status, cognitive ability, or social support system  Outcome: Progressing

## 2019-07-23 NOTE — ANESTHESIA POSTPROCEDURE EVALUATION
Post-Op Assessment Note    CV Status:  Stable    Pain management: adequate     Mental Status:  Alert and awake   Hydration Status:  Stable   PONV Controlled:  None   Airway Patency:  Patent   Post Op Vitals Reviewed: Yes      Staff: Anesthesiologist     Post-op block assessment: catheter intact and site cleaned    Vitals:    07/23/19 0903   BP: 111/57   Pulse: 81   Resp:    Temp:    SpO2:          BP      Temp      Pulse     Resp      SpO2

## 2019-07-23 NOTE — OB LABOR/OXYTOCIN SAFETY PROGRESS
Oxytocin Safety Progress Check Note - Mi Fregoso 20 y o  female MRN: [id number]    Unit/Bed#: -01 Encounter: [id number]    OB History    Para Term  AB Living   2 1 1 0 0 1   SAB TAB Ectopic Multiple Live Births   0 0 0 0 1     Gestational Age: 38w9d     Contraction Frequency (minutes): 5-6  Contraction Quality: Mild  Tachysystole: No   Dilation: 3-4        Effacement (%): 80  Station: -1  Baseline Rate: 135 bpm  Fetal Heart Rate: 125 BPM  FHR Category: Category I          Notes/comments:   Patient unchanged after 4 hours of expectant management  Will start pitocin at this time as patient appears ruptured with unknown time of rupture             Yogesh Banuelos MD 2019 10:16 PM

## 2019-07-23 NOTE — OB LABOR/OXYTOCIN SAFETY PROGRESS
Oxytocin Safety Progress Check Note - Deedee Colin 20 y o  female MRN: [id number]    Unit/Bed#: -01 Encounter: [id number]    OB History    Para Term  AB Living   2 1 1 0 0 1   SAB TAB Ectopic Multiple Live Births   0 0 0 0 1     Gestational Age: 41w0d  Dose (carly-units/min) Oxytocin: 10 carly-units/min  Contraction Frequency (minutes): 1 5-3  Contraction Quality: Mild, Moderate  Tachysystole: No   Dilation: 7-8        Effacement (%): 80  Station: 0  Baseline Rate: 120 bpm  Fetal Heart Rate: 120 BPM  FHR Category: Category I          Notes/comments:   Patient reports feeling mild pelvic pressure  Continued to make steady progress with overall reassuring fetal heart tones  Continue with pitocin titration and expectant management            Lc Godinez MD 2019 4:46 AM

## 2019-07-23 NOTE — L&D DELIVERY NOTE
DELIVERY NOTE  Josiane Rea 20 y o  female MRN: [id number]  Unit/Bed#: -01 Encounter: [id number]    Obstetrician:   Dr Jamie Treviño MD    Assistant: Dr Carlota Thompson DO    Pre-Delivery Diagnosis: Term pregnancy    Post-Delivery Diagnosis: Same as above - Delivered    Procedure: Spontaneous vaginal delivery    Indications for instrumental delivery: none    Estimated Blood Loss: 100 cc           Complications:  none    Description of Delivery: Patient delivered a viable Male  over intact perineum  After delivery of the fetal head, nuchal cord was assessed and not found  With the assistance of maternal expulsive efforts and downward traction of fetal head, the anterior shoulder was delivered  The same manner was applied for the posterior shoulder but with upward traction  After delivery of the , the umbilical cord was doubly clamped and cut and the  was passed off to  staff for routine care  Umbilical cord blood and umbilical artery and venous gases were collected  Placenta was delivered with fundal massage and gentle traction on the cord  Placenta delivered intact with a 3-vessel cord  Active management of the third stage of labor was undertaken with IV pitocin  Bleeding was noted to be minimal     Mother and baby are currently recovering nicely in stable condition  All needles, sponges and instruments were accounted for x2  Dr Jamie Treviño and Dr Carlota Thompson were present and scrubbed in throughout the entire procedure

## 2019-07-23 NOTE — DISCHARGE SUMMARY
Discharge Summary - Gynecology  Shantell Ovalles 20 y o  female MRN: [id number]  Unit/Bed#: -01 Encounter: [id number]    Admission Date: 2019   Discharge Date: 2019    Attending Physician: Dr Ishan Caro    Admitting Diagnosis:   41 weeks gestation of pregnancy [Z3A 41]    Discharge Diagnosis: Same    Procedures Performed: Spontaneous Vaginal Delivery, [facility] Course: Shantell Ovalles is a 20 y o  [id number] who was admitted at [address] [place] [address] for PROM on 19  She made continuous cervical change and received Pitocin for augmentation and an epidural for pain control  She proceeded to make cervical change and became complete on 19 at 0755  She started pushing at [address]  She then underwent an uncomplicated spontaneous vaginal delivery and delivered a viable male  at 0900  APGARS were 8, 9 at 1 and 5 minutes, respectively   weighed 7lb 4oz  Placenta was delivered at 0906   was then transferred to  nursery  Patient tolerated the procedure well and was transferred to recovery in stable condition  The patient's post partum course was unremarkable  On day of discharge, she was ambulating and able to reasonably perform all ADLs  She was voiding and had appropriate bowel function  Pain was well controlled  She was discharged home on postpartum day #2 without complications  Patient was instructed to follow up with Dr Ishan Caro as an outpatient and was given appropriate warnings to call provider if she develops signs of infection or uncontrolled pain  On day of discharge she was ambulating, voiding spontaneously, tolerating oral intake and hemodynamically stable  She is bottle feeding       Lab Results:   Lab Results   Component Value Date    WBC 8 96 2019    HGB 12 1 2019    HCT 38 4 2019    MCV 92 2019     2019     Lab Results   Component Value Date    GLUCOSE 123 2019    GLUCOSE 115 2019    CALCIUM 9 0 2017    K 3 4 (L) 08/06/2017    CO2 24 08/06/2017     08/06/2017    BUN 6 08/06/2017    CREATININE 0 81 08/06/2017     Lab Results   Component Value Date/Time    POCGLU 99 04/17/2019 09:29 PM     Lab Results   Component Value Date    PTT 29 08/06/2017     Lab Results   Component Value Date    INR 1 04 08/06/2017    PROTIME 10 9 08/06/2017       Condition at Discharge: good     Gynecology Discharge Instructions:  1  No heavy lifting more than one full gallon of milk (about 8 lbs)  2  Nothing in the vagina for 6-8 weeks and until cleared by Dr Erlinda Looney  3  No tub baths or swimming  4  You may take stairs one at a time, touching each step with both feet for the first few days, then as tolerated  5  Call the office for fever greater than 100 4, heavy vaginal bleeding or increasing pain  8  Activity as tolerated     Discharge Medications:   Motrin 600 mg orally every 6 hours as needed for pain    Provisions for Follow-Up Care: See after visit summary for information related to follow-up care and any pertinent home health orders        Disposition: Home    Planned Readmission: Ellie Swanson DO  7/23/2019  10:41 AM

## 2019-07-23 NOTE — OB LABOR/OXYTOCIN SAFETY PROGRESS
Labor Progress Note - Leila Lara 20 y o  female MRN: [id number]    Unit/Bed#: -01 Encounter: [id number]    OB History    Para Term  AB Living   2 1 1 0 0 1   SAB TAB Ectopic Multiple Live Births   0 0 0 0 1     Gestational Age: 41w0d  Dose (carly-units/min) Oxytocin: 0 carly-units/min  Contraction Frequency (minutes): 3-4 5  Contraction Quality: Moderate, Strong  Tachysystole: No   Dilation: 10  Dilation Complete Date: 19  Dilation Complete Time: 0755  Effacement (%): 100  Station: 2  Baseline Rate: 125 bpm  Fetal Heart Rate: 115 BPM  FHR Category: Category I          Notes/comments: Patient is fully dilated and feeling strong contractions  She performed two practice pushes and is pushing well  She is ready to deliver            Lindsey Joe DO 2019 8:04 AM

## 2019-07-23 NOTE — OB LABOR/OXYTOCIN SAFETY PROGRESS
Oxytocin Safety Progress Check Note - Farnaz Medina 20 y o  female MRN: [id number]    Unit/Bed#: -01 Encounter: [id number]    OB History    Para Term  AB Living   2 1 1 0 0 1   SAB TAB Ectopic Multiple Live Births   0 0 0 0 1     Gestational Age: 41w0d  Dose (carly-units/min) Oxytocin: 12 carly-units/min  Contraction Frequency (minutes): 1 5-4  Contraction Quality: Mild, Moderate  Tachysystole: No   Dilation: 4-5        Effacement (%): 80  Station: -2  Baseline Rate: 125 bpm  Fetal Heart Rate: 132 BPM  FHR Category: Category I          Notes/comments:   Patient reports feeling no contractions or pressure  Continues to make steady progress in dilation with Cat1 tracing  Pitocin currently at 12  Will continue expectant management with pitocin titration at this time             Jamal Clarke MD 2019 1:35 AM

## 2019-07-24 RX ADMIN — IBUPROFEN 600 MG: 600 TABLET ORAL at 12:17

## 2019-07-24 RX ADMIN — DOCUSATE SODIUM 100 MG: 100 CAPSULE, LIQUID FILLED ORAL at 07:46

## 2019-07-24 RX ADMIN — IBUPROFEN 600 MG: 600 TABLET ORAL at 19:18
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
## 2019-07-24 NOTE — PROGRESS NOTES
POSTPARTUM NOTE  Ace Rolling 20 y o  female MRN: [id number]  Unit/Bed#: -01 Encounter: [id number]    Date: 19  Procedure: Spontaneous Vaginal Delivery  Postpartum/Postop Day #: 1     SUBJECTIVE:  Patient seen and examined at bedside  PPD#1 s/p   Baby in room with mom  Patient is doing well this morning and has no complaints  She is not interested in post-partum birth control      Pain: no  Tolerating Oral Intake: yes   Voiding: yes  Flatus: no  Bowel Movement: no  Ambulating: yes  Breastfeeding: no  Chest Pain: no  Shortness of Breath: no  Leg Pain/Discomfort: no  Lochia: minimal    OBJECTIVE:   Vitals: Temp:  [97 6 °F (36 4 °C)-98 6 °F (37 °C)] 98 2 °F (36 8 °C)  HR:  [63-98] 64  Resp:  [15-20] 18  BP: ()/(50-82) 115/51  General: No Acute Distress   Cardiovascular: Regular, Rate and Rhythm, no murmur, normal S1/S2   Lungs: Clear to Auscultation Bilaterally, no wheezing, non-labored breathing   Abdomen: Soft, non-distended, non-tender, no rebound, guarding or CVA tenderness   Fundus: Firm & Non-Tender, Fundal Location:  at the umbilicus  Lower Extremities: Non-tender, Edema Minimal    LABS / TESTS / MEDICATION:    Recent Results (from the past 72 hour(s))   Type and screen    Collection Time: 19  6:51 PM   Result Value Ref Range    ABO Grouping O     Rh Factor Positive     Antibody Screen Negative     Specimen Expiration Date 2019    CBC and Platelet    Collection Time: 19  6:51 PM   Result Value Ref Range    WBC 8 96 4 31 - 10 16 Thousand/uL    RBC 4 17 3 81 - 5 12 Million/uL    Hemoglobin 12 1 11 5 - 15 4 g/dL    Hematocrit 38 4 34 8 - 46 1 %    MCV 92 82 - 98 fL    MCH 29 0 26 8 - 34 3 pg    MCHC 31 5 31 4 - 37 4 g/dL    RDW 13 4 11 6 - 15 1 %    Platelets 239 144 - 783 Thousands/uL    MPV 10 8 8 9 - 12 7 fL   RPR    Collection Time: 19  6:51 PM   Result Value Ref Range    RPR Non-Reactive Non-Reactive   Rapid drug screen, urine    Collection Time: 19  6:51 PM Result Value Ref Range    Amph/Meth UR Negative Negative    Barbiturate Ur Negative Negative    Benzodiazepine Urine Negative Negative    Cocaine Urine Negative Negative    Methadone Urine Negative Negative    Opiate Urine Negative Negative    PCP Ur Negative Negative    THC Urine Negative Negative   Blood gas, arterial, cord    Collection Time: 07/23/19  8:50 AM   Result Value Ref Range    pH, Cord Art 7 259 7 230 - 7 430    pCO2, Cord Art 50 2 30 0 - 60 0    pO2, Cord Art 21 4 5 0 - 25 0 mm HG    HCO3, Cord Art 22 0 17 3 - 27 3 mmol/L    Base Exc, Cord Art -5 4 (L) 3 0 - 11 0 mmol/L    O2 Content, Cord Art 7 8 ml/dl    O2 Hgb, Arterial Cord 39 0 %   Blood gas, venous, cord    Collection Time: 07/23/19  8:50 AM   Result Value Ref Range    pH, Cord Rj 7 380 7 190 - 7 490    pCO2, Cord Rj 36 8 27 0 - 43 0 mm HG    pO2, Cord Rj 40 4 15 0 - 45 0 mm HG    HCO3, Cord Rj 21 3 12 2 - 28 6 mmol/L    Base Exc, Cord Rj -3 3 (L) 1 0 - 9 0 mmol/L    O2 Cont, Cord Rj 16 4 mL/dL    O2 HGB,VENOUS CORD 81 7 %         docusate sodium 100 mg Oral BID       acetaminophen 650 mg Q4H PRN   calcium carbonate 1,000 mg Daily PRN   diphenhydrAMINE 25 mg Q6H PRN   hydrocortisone 1 application PRN   ibuprofen 600 mg Q6H PRN   ondansetron 4 mg Q8H PRN   witch hazel-glycerin 1 pad PRN       ASSESSMENT:   20 y o  [id number] s/p Spontaneous Vaginal Delivery Postpartum day  1  PLAN:  1) Delivery: Continue routine postpartum care, encourage ambulation, advance diet as tolerated  2) Anticipate discharge home tomorrow    Signature / Title: Anisa Valencia DO, Family Medicine  Date: 7/24/2019  Time: 5:33 AM

## 2019-07-24 NOTE — PLAN OF CARE
Problem: Knowledge Deficit  Goal: Verbalizes understanding of labor plan  Description  Assess patient/family/caregiver's baseline knowledge level and ability to understand information  Provide education via patient/family/caregiver's preferred learning method at appropriate level of understanding  1  Provide teaching at level of understanding  2  Provide teaching via preferred learning method(s)  Outcome: Progressing  Goal: Patient/family/caregiver demonstrates understanding of disease process, treatment plan, medications, and discharge instructions  Description  Complete learning assessment and assess knowledge base  Interventions:  - Provide teaching at level of understanding  - Provide teaching via preferred learning methods  Outcome: Progressing     Problem: Labor & Delivery  Goal: Manages discomfort  Description  Assess and monitor for signs and symptoms of discomfort  Assess patient's pain level regularly and per hospital policy  Administer medications as ordered  Support use of nonpharmacological methods to help control pain such as distraction, imagery, relaxation, and application of heat and cold  Collaborate with interdisciplinary team and patient to determine appropriate pain management plan  1  Include patient in decisions related to comfort  2  Offer non-pharmacological pain management interventions  3  Report ineffective pain management to physician  Outcome: Progressing  Goal: Patient vital signs are stable  Description  1  Assess vital signs - vaginal delivery    Outcome: Progressing     Problem: PAIN - ADULT  Goal: Verbalizes/displays adequate comfort level or baseline comfort level  Description  Interventions:  - Encourage patient to monitor pain and request assistance  - Assess pain using appropriate pain scale  - Administer analgesics based on type and severity of pain and evaluate response  - Implement non-pharmacological measures as appropriate and evaluate response  - Consider cultural and social influences on pain and pain management  - Notify physician/advanced practitioner if interventions unsuccessful or patient reports new pain  Outcome: Progressing     Problem: INFECTION - ADULT  Goal: Absence or prevention of progression during hospitalization  Description  INTERVENTIONS:  - Assess and monitor for signs and symptoms of infection  - Monitor lab/diagnostic results  - Monitor all insertion sites, i e  indwelling lines, tubes, and drains  - Monitor endotracheal (as able) and nasal secretions for changes in amount and color  - [place] appropriate cooling/warming therapies per order  - Administer medications as ordered  - Instruct and encourage patient and family to use good hand hygiene technique  - Identify and instruct in appropriate isolation precautions for identified infection/condition  Outcome: Progressing  Goal: Absence of fever/infection during neutropenic period  Description  INTERVENTIONS:  - Monitor WBC  - Implement neutropenic guidelines  Outcome: Progressing     Problem: SAFETY ADULT  Goal: Patient will remain free of falls  Description  INTERVENTIONS:  - Assess patient frequently for physical needs  -  Identify cognitive and physical deficits and behaviors that affect risk of falls    -  [place] fall precautions as indicated by assessment   - Educate patient/family on patient safety including physical limitations  - Instruct patient to call for assistance with activity based on assessment  - Modify environment to reduce risk of injury  - Consider OT/PT consult to assist with strengthening/mobility  Outcome: Progressing  Goal: Maintain or return to baseline ADL function  Description  INTERVENTIONS:  -  Assess patient's ability to carry out ADLs; assess patient's baseline for ADL function and identify physical deficits which impact ability to perform ADLs (bathing, care of mouth/teeth, toileting, grooming, dressing, etc )  - Assess/evaluate cause of self-care deficits   - Assess range of motion  - Assess patient's mobility; develop plan if impaired  - Assess patient's need for assistive devices and provide as appropriate  - Encourage maximum independence but intervene and supervise when necessary  ¯ Involve family in performance of ADLs  ¯ Assess for home care needs following discharge   ¯ Request OT consult to assist with ADL evaluation and planning for discharge  ¯ Provide patient education as appropriate  Outcome: Progressing  Goal: Maintain or return mobility status to optimal level  Description  INTERVENTIONS:  - Assess patient's baseline mobility status (ambulation, transfers, stairs, etc )    - Identify cognitive and physical deficits and behaviors that affect mobility  - Identify mobility aids required to assist with transfers and/or ambulation (gait belt, sit-to-stand, lift, walker, cane, etc )  - [place] fall precautions as indicated by assessment  - Record patient progress and toleration of activity level on Mobility SBAR; progress patient to next Phase/Stage  - Instruct patient to call for assistance with activity based on assessment  - Request Rehabilitation consult to assist with strengthening/weightbearing, etc   Outcome: Progressing     Problem: DISCHARGE PLANNING  Goal: Discharge to home or other facility with appropriate resources  Description  INTERVENTIONS:  - Identify barriers to discharge w/patient and caregiver  - Arrange for needed discharge resources and transportation as appropriate  - Identify discharge learning needs (meds, wound care, etc )  - Arrange for interpretive services to assist at discharge as needed  - Refer to Case Management Department for coordinating discharge planning if the patient needs post-hospital services based on physician/advanced practitioner order or complex needs related to functional status, cognitive ability, or social support system  Outcome: Progressing     Problem: POSTPARTUM  Goal: Experiences normal postpartum course  Description  INTERVENTIONS:  - Monitor maternal vital signs  - Assess uterine involution and lochia  Outcome: Progressing  Goal: Appropriate maternal -  bonding  Description  INTERVENTIONS:  - Identify family support  - Assess for appropriate maternal/infant bonding   -Encourage maternal/infant bonding opportunities  - Referral to  or  as needed  Outcome: Progressing  Goal: Establishment of infant feeding pattern  Description  INTERVENTIONS:  - Assess breast/bottle feeding  - Refer to lactation as needed  Outcome: Progressing  Goal: Incision(s), wounds(s) or drain site(s) healing without S/S of infection  Description  INTERVENTIONS  - Assess and document risk factors for skin impairment   - Assess and document dressing, incision, wound bed, drain sites and surrounding tissue  - Initiate Nutrition services consult and/or wound management as needed  Outcome: Progressing

## 2019-07-24 NOTE — PLAN OF CARE
Problem: Knowledge Deficit  Goal: Verbalizes understanding of labor plan  Description  Assess patient/family/caregiver's baseline knowledge level and ability to understand information  Provide education via patient/family/caregiver's preferred learning method at appropriate level of understanding  1  Provide teaching at level of understanding  2  Provide teaching via preferred learning method(s)  Outcome: Progressing  Goal: Patient/family/caregiver demonstrates understanding of disease process, treatment plan, medications, and discharge instructions  Description  Complete learning assessment and assess knowledge base  Interventions:  - Provide teaching at level of understanding  - Provide teaching via preferred learning methods  Outcome: Progressing     Problem: Labor & Delivery  Goal: Manages discomfort  Description  Assess and monitor for signs and symptoms of discomfort  Assess patient's pain level regularly and per hospital policy  Administer medications as ordered  Support use of nonpharmacological methods to help control pain such as distraction, imagery, relaxation, and application of heat and cold  Collaborate with interdisciplinary team and patient to determine appropriate pain management plan  1  Include patient in decisions related to comfort  2  Offer non-pharmacological pain management interventions  3  Report ineffective pain management to physician  Outcome: Progressing  Goal: Patient vital signs are stable  Description  1  Assess vital signs - vaginal delivery    Outcome: Progressing     Problem: PAIN - ADULT  Goal: Verbalizes/displays adequate comfort level or baseline comfort level  Description  Interventions:  - Encourage patient to monitor pain and request assistance  - Assess pain using appropriate pain scale  - Administer analgesics based on type and severity of pain and evaluate response  - Implement non-pharmacological measures as appropriate and evaluate response  - Consider cultural and social influences on pain and pain management  - Notify physician/advanced practitioner if interventions unsuccessful or patient reports new pain  Outcome: Progressing     Problem: INFECTION - ADULT  Goal: Absence or prevention of progression during hospitalization  Description  INTERVENTIONS:  - Assess and monitor for signs and symptoms of infection  - Monitor lab/diagnostic results  - Monitor all insertion sites, i e  indwelling lines, tubes, and drains  - Monitor endotracheal (as able) and nasal secretions for changes in amount and color  - [place] appropriate cooling/warming therapies per order  - Administer medications as ordered  - Instruct and encourage patient and family to use good hand hygiene technique  - Identify and instruct in appropriate isolation precautions for identified infection/condition  Outcome: Progressing  Goal: Absence of fever/infection during neutropenic period  Description  INTERVENTIONS:  - Monitor WBC  - Implement neutropenic guidelines  Outcome: Progressing     Problem: SAFETY ADULT  Goal: Patient will remain free of falls  Description  INTERVENTIONS:  - Assess patient frequently for physical needs  -  Identify cognitive and physical deficits and behaviors that affect risk of falls    -  [place] fall precautions as indicated by assessment   - Educate patient/family on patient safety including physical limitations  - Instruct patient to call for assistance with activity based on assessment  - Modify environment to reduce risk of injury  - Consider OT/PT consult to assist with strengthening/mobility  Outcome: Progressing  Goal: Maintain or return to baseline ADL function  Description  INTERVENTIONS:  -  Assess patient's ability to carry out ADLs; assess patient's baseline for ADL function and identify physical deficits which impact ability to perform ADLs (bathing, care of mouth/teeth, toileting, grooming, dressing, etc )  - Assess/evaluate cause of self-care deficits   - Assess range of motion  - Assess patient's mobility; develop plan if impaired  - Assess patient's need for assistive devices and provide as appropriate  - Encourage maximum independence but intervene and supervise when necessary  ¯ Involve family in performance of ADLs  ¯ Assess for home care needs following discharge   ¯ Request OT consult to assist with ADL evaluation and planning for discharge  ¯ Provide patient education as appropriate  Outcome: Progressing  Goal: Maintain or return mobility status to optimal level  Description  INTERVENTIONS:  - Assess patient's baseline mobility status (ambulation, transfers, stairs, etc )    - Identify cognitive and physical deficits and behaviors that affect mobility  - Identify mobility aids required to assist with transfers and/or ambulation (gait belt, sit-to-stand, lift, walker, cane, etc )  - [place] fall precautions as indicated by assessment  - Record patient progress and toleration of activity level on Mobility SBAR; progress patient to next Phase/Stage  - Instruct patient to call for assistance with activity based on assessment  - Request Rehabilitation consult to assist with strengthening/weightbearing, etc   Outcome: Progressing     Problem: DISCHARGE PLANNING  Goal: Discharge to home or other facility with appropriate resources  Description  INTERVENTIONS:  - Identify barriers to discharge w/patient and caregiver  - Arrange for needed discharge resources and transportation as appropriate  - Identify discharge learning needs (meds, wound care, etc )  - Arrange for interpretive services to assist at discharge as needed  - Refer to Case Management Department for coordinating discharge planning if the patient needs post-hospital services based on physician/advanced practitioner order or complex needs related to functional status, cognitive ability, or social support system  Outcome: Progressing     Problem: POSTPARTUM  Goal: Experiences normal postpartum course  Description  INTERVENTIONS:  - Monitor maternal vital signs  - Assess uterine involution and lochia  Outcome: Progressing  Goal: Appropriate maternal -  bonding  Description  INTERVENTIONS:  - Identify family support  - Assess for appropriate maternal/infant bonding   -Encourage maternal/infant bonding opportunities  - Referral to  or  as needed  Outcome: Progressing  Goal: Establishment of infant feeding pattern  Description  INTERVENTIONS:  - Assess breast/bottle feeding  - Refer to lactation as needed  Outcome: Progressing  Goal: Incision(s), wounds(s) or drain site(s) healing without S/S of infection  Description  INTERVENTIONS  - Assess and document risk factors for skin impairment   - Assess and document dressing, incision, wound bed, drain sites and surrounding tissue  - Initiate Nutrition services consult and/or wound management as needed  Outcome: Progressing

## 2019-07-25 ENCOUNTER — TRANSITIONAL CARE MANAGEMENT (OUTPATIENT)
Dept: FAMILY MEDICINE CLINIC | Facility: CLINIC | Age: 19
End: 2019-07-25

## 2019-07-25 VITALS
SYSTOLIC BLOOD PRESSURE: 125 MMHG | DIASTOLIC BLOOD PRESSURE: 55 MMHG | BODY MASS INDEX: 36.62 KG/M2 | RESPIRATION RATE: 18 BRPM | HEIGHT: 62 IN | TEMPERATURE: 98.4 F | WEIGHT: 199 LBS | HEART RATE: 57 BPM | OXYGEN SATURATION: 97 %

## 2019-07-25 DIAGNOSIS — R52 PAIN: Primary | ICD-10-CM

## 2019-07-25 PROCEDURE — 99024 POSTOP FOLLOW-UP VISIT: CPT | Performed by: FAMILY MEDICINE

## 2019-07-25 RX ORDER — IBUPROFEN 600 MG/1
600 TABLET ORAL EVERY 6 HOURS PRN
Qty: 60 TABLET | Refills: 3 | Status: SHIPPED | OUTPATIENT
Start: 2019-07-25 | End: 2019-08-06

## 2019-07-25 RX ORDER — IBUPROFEN 600 MG/1
TABLET ORAL
Status: COMPLETED
Start: 2019-07-25 | End: 2019-07-25

## 2019-07-25 RX ADMIN — IBUPROFEN 600 MG: 600 TABLET ORAL at 02:17

## 2019-07-25 RX ADMIN — IBUPROFEN 600 MG: 600 TABLET ORAL at 07:45

## 2019-07-25 NOTE — DISCHARGE INSTRUCTIONS
Vaginal Delivery   WHAT YOU NEED TO KNOW:   A vaginal delivery occurs when your baby is born through your vagina (birth canal)  DISCHARGE INSTRUCTIONS:   Seek care immediately if:   · Your leg feels warm, tender, and painful  It may look swollen and red  · You have a fever  · You are urinating very little, or not at all  · You have heavy vaginal bleeding that fills 1 or more sanitary pads in 1 hour  · You feel weak, dizzy, or faint  Contact your healthcare provider if:   · Your abdominal or perineal pain does not go away, or gets worse  · You feel depressed  · You have questions or concerns about your condition or care  Medicines:  · NSAIDs , such as ibuprofen, help decrease swelling, pain, and fever  This medicine is available with or without a doctor's order  NSAIDs can cause stomach bleeding or kidney problems in certain people  If you take blood thinner medicine, always ask your healthcare provider if NSAIDs are safe for you  Always read the medicine label and follow directions  · Stool softeners  make it easier for you to have a bowel movement  You may need this medicine to treat or prevent constipation  · Take your medicine as directed  Contact your healthcare provider if you think your medicine is not helping or if you have side effects  Tell him or her if you are allergic to any medicine  Keep a list of the medicines, vitamins, and herbs you take  Include the amounts, and when and why you take them  Bring the list or the pill bottles to follow-up visits  Carry your medicine list with you in case of an emergency  Follow up with your healthcare provider:  Most women need to return 6 weeks after a vaginal delivery  Ask your healthcare provider how to care for your wounds or stitches, if you have them  Write down your questions so you remember to ask them during your visits  Activity:  Rest as much as possible  Try to keep all activities short   You may be able to do some exercise soon after you have your baby  Talk with your healthcare provider before you start exercising  If you work outside the home, ask when you can return to your job  Kegel exercises:  Kegel exercises may help your vaginal and rectal muscles heal faster  You can do Kegel exercises by tightening and relaxing the muscles around your vagina  Kegel exercises help make the muscles stronger  Breast care:  When your milk comes in, your breasts may feel full and hard  Ask how to care for your breasts, even if you are not breastfeeding  Constipation:  You may have constipation for a period of time after you have your baby  Do not try to push the bowel movement out if it is too hard  High-fiber foods and extra liquids can help you prevent constipation  Examples of high-fiber foods are fruit and bran  Prune juice and water are good liquids to drink  You may also be told to take over-the-counter fiber and stool softener medicines  Take these items as directed  Ask how to prevent or treat hemorrhoids  Perineum care: Your perineum is the area between your vagina and anus  Keep the area clean and dry  This will help it heal and prevent infection  Wash the area gently with soap and water when you bathe or shower  Rinse your perineum with warm water after you urinate or have a bowel movement  Your healthcare provider may suggest you use a warm sitz bath to help decrease pain  To take a sitz bath, fill a bathtub with 4 to 6 inches of warm water  You may also use a sitz bath pan that fits inside the toilet  Sit in the sitz bath for 20 minutes  Do this 2 to 3 times a day, or as directed  The warm water can help decrease pain and swelling  Vaginal discharge: You will have vaginal discharge, called lochia, after your delivery  The lochia is red or dark brown with clots for 1 to 3 days after the birth  The amount will decrease and turn pale pink or brown for 3 to 10 days  It will turn white or yellow on the 10th or 14th day  Lochia is usually gone within 3 weeks  Use a sanitary pad rather than a tampon to prevent a vaginal infection  You will have lochia for up to 3 weeks after your baby is born  Monthly periods: Your period may start again within 7 to 9 weeks after your baby is born  If you are breastfeeding, it may take longer for your period to start again  You can still get pregnant again even though you do not have your monthly period  Talk with your healthcare provider about a birth control method if you do not want to get pregnant  Mood changes: Many new mothers have some kind of mood changes after delivery  Some of these changes occur because of lack of sleep, hormone changes, and caring for a new baby  Some mood changes can be more serious, such as postpartum depression  Talk with your healthcare provider if you feel unable to care for yourself or your baby  Sexual activity:  Do not have sex until your healthcare provider says it is okay  You may notice you have a decreased desire for sex, or sex may be painful  You may need to use a vaginal lubricant (gel) to help make sex more comfortable  © 2017 [facility] Information is for End User's use only and may not be sold, redistributed or otherwise used for commercial purposes  All illustrations and images included in CareNotes® are the copyrighted property of A D A M [employer]  or Anibal Martinez  The above information is an  only  It is not intended as medical advice for individual conditions or treatments  Talk to your doctor, nurse or pharmacist before following any medical regimen to see if it is safe and effective for you

## 2019-07-25 NOTE — PLAN OF CARE
Problem: Knowledge Deficit  Goal: Verbalizes understanding of labor plan  Description  Assess patient/family/caregiver's baseline knowledge level and ability to understand information  Provide education via patient/family/caregiver's preferred learning method at appropriate level of understanding  1  Provide teaching at level of understanding  2  Provide teaching via preferred learning method(s)  Outcome: Progressing  Goal: Patient/family/caregiver demonstrates understanding of disease process, treatment plan, medications, and discharge instructions  Description  Complete learning assessment and assess knowledge base  Interventions:  - Provide teaching at level of understanding  - Provide teaching via preferred learning methods  Outcome: Progressing     Problem: Labor & Delivery  Goal: Manages discomfort  Description  Assess and monitor for signs and symptoms of discomfort  Assess patient's pain level regularly and per hospital policy  Administer medications as ordered  Support use of nonpharmacological methods to help control pain such as distraction, imagery, relaxation, and application of heat and cold  Collaborate with interdisciplinary team and patient to determine appropriate pain management plan  1  Include patient in decisions related to comfort  2  Offer non-pharmacological pain management interventions  3  Report ineffective pain management to physician  Outcome: Progressing  Goal: Patient vital signs are stable  Description  1  Assess vital signs - vaginal delivery    Outcome: Progressing     Problem: PAIN - ADULT  Goal: Verbalizes/displays adequate comfort level or baseline comfort level  Description  Interventions:  - Encourage patient to monitor pain and request assistance  - Assess pain using appropriate pain scale  - Administer analgesics based on type and severity of pain and evaluate response  - Implement non-pharmacological measures as appropriate and evaluate response  - Consider cultural and social influences on pain and pain management  - Notify physician/advanced practitioner if interventions unsuccessful or patient reports new pain  Outcome: Progressing     Problem: INFECTION - ADULT  Goal: Absence or prevention of progression during hospitalization  Description  INTERVENTIONS:  - Assess and monitor for signs and symptoms of infection  - Monitor lab/diagnostic results  - Monitor all insertion sites, i e  indwelling lines, tubes, and drains  - Monitor endotracheal (as able) and nasal secretions for changes in amount and color  - [place] appropriate cooling/warming therapies per order  - Administer medications as ordered  - Instruct and encourage patient and family to use good hand hygiene technique  - Identify and instruct in appropriate isolation precautions for identified infection/condition  Outcome: Progressing  Goal: Absence of fever/infection during neutropenic period  Description  INTERVENTIONS:  - Monitor WBC  - Implement neutropenic guidelines  Outcome: Progressing     Problem: SAFETY ADULT  Goal: Patient will remain free of falls  Description  INTERVENTIONS:  - Assess patient frequently for physical needs  -  Identify cognitive and physical deficits and behaviors that affect risk of falls    -  [place] fall precautions as indicated by assessment   - Educate patient/family on patient safety including physical limitations  - Instruct patient to call for assistance with activity based on assessment  - Modify environment to reduce risk of injury  - Consider OT/PT consult to assist with strengthening/mobility  Outcome: Progressing  Goal: Maintain or return to baseline ADL function  Description  INTERVENTIONS:  -  Assess patient's ability to carry out ADLs; assess patient's baseline for ADL function and identify physical deficits which impact ability to perform ADLs (bathing, care of mouth/teeth, toileting, grooming, dressing, etc )  - Assess/evaluate cause of self-care deficits   - Assess range of motion  - Assess patient's mobility; develop plan if impaired  - Assess patient's need for assistive devices and provide as appropriate  - Encourage maximum independence but intervene and supervise when necessary  ¯ Involve family in performance of ADLs  ¯ Assess for home care needs following discharge   ¯ Request OT consult to assist with ADL evaluation and planning for discharge  ¯ Provide patient education as appropriate  Outcome: Progressing  Goal: Maintain or return mobility status to optimal level  Description  INTERVENTIONS:  - Assess patient's baseline mobility status (ambulation, transfers, stairs, etc )    - Identify cognitive and physical deficits and behaviors that affect mobility  - Identify mobility aids required to assist with transfers and/or ambulation (gait belt, sit-to-stand, lift, walker, cane, etc )  - [place] fall precautions as indicated by assessment  - Record patient progress and toleration of activity level on Mobility SBAR; progress patient to next Phase/Stage  - Instruct patient to call for assistance with activity based on assessment  - Request Rehabilitation consult to assist with strengthening/weightbearing, etc   Outcome: Progressing     Problem: DISCHARGE PLANNING  Goal: Discharge to home or other facility with appropriate resources  Description  INTERVENTIONS:  - Identify barriers to discharge w/patient and caregiver  - Arrange for needed discharge resources and transportation as appropriate  - Identify discharge learning needs (meds, wound care, etc )  - Arrange for interpretive services to assist at discharge as needed  - Refer to Case Management Department for coordinating discharge planning if the patient needs post-hospital services based on physician/advanced practitioner order or complex needs related to functional status, cognitive ability, or social support system  Outcome: Progressing     Problem: POSTPARTUM  Goal: Experiences normal postpartum course  Description  INTERVENTIONS:  - Monitor maternal vital signs  - Assess uterine involution and lochia  Outcome: Progressing  Goal: Appropriate maternal -  bonding  Description  INTERVENTIONS:  - Identify family support  - Assess for appropriate maternal/infant bonding   -Encourage maternal/infant bonding opportunities  - Referral to  or  as needed  Outcome: Progressing  Goal: Establishment of infant feeding pattern  Description  INTERVENTIONS:  - Assess breast/bottle feeding  - Refer to lactation as needed  Outcome: Progressing  Goal: Incision(s), wounds(s) or drain site(s) healing without S/S of infection  Description  INTERVENTIONS  - Assess and document risk factors for skin impairment   - Assess and document dressing, incision, wound bed, drain sites and surrounding tissue  - Initiate Nutrition services consult and/or wound management as needed  Outcome: Progressing

## 2019-07-25 NOTE — PLAN OF CARE
Problem: Knowledge Deficit  Goal: Verbalizes understanding of labor plan  Description  Assess patient/family/caregiver's baseline knowledge level and ability to understand information  Provide education via patient/family/caregiver's preferred learning method at appropriate level of understanding  1  Provide teaching at level of understanding  2  Provide teaching via preferred learning method(s)  Outcome: Progressing  Goal: Patient/family/caregiver demonstrates understanding of disease process, treatment plan, medications, and discharge instructions  Description  Complete learning assessment and assess knowledge base    Interventions:  - Provide teaching at level of understanding  - Provide teaching via preferred learning methods  Outcome: Progressing     Problem: PAIN - ADULT  Goal: Verbalizes/displays adequate comfort level or baseline comfort level  Description  Interventions:  - Encourage patient to monitor pain and request assistance  - Assess pain using appropriate pain scale  - Administer analgesics based on type and severity of pain and evaluate response  - Implement non-pharmacological measures as appropriate and evaluate response  - Consider cultural and social influences on pain and pain management  - Notify physician/advanced practitioner if interventions unsuccessful or patient reports new pain  Outcome: Progressing     Problem: INFECTION - ADULT  Goal: Absence or prevention of progression during hospitalization  Description  INTERVENTIONS:  - Assess and monitor for signs and symptoms of infection  - Monitor lab/diagnostic results  - Monitor all insertion sites, i e  indwelling lines, tubes, and drains  - Monitor endotracheal (as able) and nasal secretions for changes in amount and color  - [place] appropriate cooling/warming therapies per order  - Administer medications as ordered  - Instruct and encourage patient and family to use good hand hygiene technique  - Identify and instruct in appropriate isolation precautions for identified infection/condition  Outcome: Progressing  Goal: Absence of fever/infection during neutropenic period  Description  INTERVENTIONS:  - Monitor WBC  - Implement neutropenic guidelines  Outcome: Progressing     Problem: SAFETY ADULT  Goal: Patient will remain free of falls  Description  INTERVENTIONS:  - Assess patient frequently for physical needs  -  Identify cognitive and physical deficits and behaviors that affect risk of falls    -  [place] fall precautions as indicated by assessment   - Educate patient/family on patient safety including physical limitations  - Instruct patient to call for assistance with activity based on assessment  - Modify environment to reduce risk of injury  - Consider OT/PT consult to assist with strengthening/mobility  Outcome: Progressing  Goal: Maintain or return to baseline ADL function  Description  INTERVENTIONS:  -  Assess patient's ability to carry out ADLs; assess patient's baseline for ADL function and identify physical deficits which impact ability to perform ADLs (bathing, care of mouth/teeth, toileting, grooming, dressing, etc )  - Assess/evaluate cause of self-care deficits   - Assess range of motion  - Assess patient's mobility; develop plan if impaired  - Assess patient's need for assistive devices and provide as appropriate  - Encourage maximum independence but intervene and supervise when necessary  ¯ Involve family in performance of ADLs  ¯ Assess for home care needs following discharge   ¯ Request OT consult to assist with ADL evaluation and planning for discharge  ¯ Provide patient education as appropriate  Outcome: Progressing  Goal: Maintain or return mobility status to optimal level  Description  INTERVENTIONS:  - Assess patient's baseline mobility status (ambulation, transfers, stairs, etc )    - Identify cognitive and physical deficits and behaviors that affect mobility  - Identify mobility aids required to assist with transfers and/or ambulation (gait belt, sit-to-stand, lift, walker, cane, etc )  - [place] fall precautions as indicated by assessment  - Record patient progress and toleration of activity level on Mobility SBAR; progress patient to next Phase/Stage  - Instruct patient to call for assistance with activity based on assessment  - Request Rehabilitation consult to assist with strengthening/weightbearing, etc   Outcome: Progressing     Problem: DISCHARGE PLANNING  Goal: Discharge to home or other facility with appropriate resources  Description  INTERVENTIONS:  - Identify barriers to discharge w/patient and caregiver  - Arrange for needed discharge resources and transportation as appropriate  - Identify discharge learning needs (meds, wound care, etc )  - Arrange for interpretive services to assist at discharge as needed  - Refer to Case Management Department for coordinating discharge planning if the patient needs post-hospital services based on physician/advanced practitioner order or complex needs related to functional status, cognitive ability, or social support system  Outcome: Progressing     Problem: POSTPARTUM  Goal: Experiences normal postpartum course  Description  INTERVENTIONS:  - Monitor maternal vital signs  - Assess uterine involution and lochia  Outcome: Progressing  Goal: Appropriate maternal -  bonding  Description  INTERVENTIONS:  - Identify family support  - Assess for appropriate maternal/infant bonding   -Encourage maternal/infant bonding opportunities  - Referral to  or  as needed  Outcome: Progressing  Goal: Establishment of infant feeding pattern  Description  INTERVENTIONS:  - Assess breast/bottle feeding  - Refer to lactation as needed  Outcome: Progressing  Goal: Incision(s), wounds(s) or drain site(s) healing without S/S of infection  Description  INTERVENTIONS  - Assess and document risk factors for skin impairment   - Assess and document dressing, incision, wound bed, drain sites and surrounding tissue  - Initiate Nutrition services consult and/or wound management as needed  Outcome: Progressing

## 2019-07-25 NOTE — PLAN OF CARE
Problem: Knowledge Deficit  Goal: Verbalizes understanding of labor plan  Description  Assess patient/family/caregiver's baseline knowledge level and ability to understand information  Provide education via patient/family/caregiver's preferred learning method at appropriate level of understanding  1  Provide teaching at level of understanding  2  Provide teaching via preferred learning method(s)  Outcome: Completed  Goal: Patient/family/caregiver demonstrates understanding of disease process, treatment plan, medications, and discharge instructions  Description  Complete learning assessment and assess knowledge base  Interventions:  - Provide teaching at level of understanding  - Provide teaching via preferred learning methods  Outcome:  Completed     Problem: Labor & Delivery  Goal: Manages discomfort  Description  Assess and monitor for signs and symptoms of discomfort  Assess patient's pain level regularly and per hospital policy  Administer medications as ordered  Support use of nonpharmacological methods to help control pain such as distraction, imagery, relaxation, and application of heat and cold  Collaborate with interdisciplinary team and patient to determine appropriate pain management plan  1  Include patient in decisions related to comfort  2  Offer non-pharmacological pain management interventions  3  Report ineffective pain management to physician  Outcome: Progressing  Goal: Patient vital signs are stable  Description  1  Assess vital signs - vaginal delivery    Outcome: Progressing     Problem: PAIN - ADULT  Goal: Verbalizes/displays adequate comfort level or baseline comfort level  Description  Interventions:  - Encourage patient to monitor pain and request assistance  - Assess pain using appropriate pain scale  - Administer analgesics based on type and severity of pain and evaluate response  - Implement non-pharmacological measures as appropriate and evaluate response  - Consider cultural and social influences on pain and pain management  - Notify physician/advanced practitioner if interventions unsuccessful or patient reports new pain  Outcome: Progressing     Problem: INFECTION - ADULT  Goal: Absence or prevention of progression during hospitalization  Description  INTERVENTIONS:  - Assess and monitor for signs and symptoms of infection  - Monitor lab/diagnostic results  - Monitor all insertion sites, i e  indwelling lines, tubes, and drains  - Monitor endotracheal (as able) and nasal secretions for changes in amount and color  - [place] appropriate cooling/warming therapies per order  - Administer medications as ordered  - Instruct and encourage patient and family to use good hand hygiene technique  - Identify and instruct in appropriate isolation precautions for identified infection/condition  Outcome: Progressing  Goal: Absence of fever/infection during neutropenic period  Description  INTERVENTIONS:  - Monitor WBC  - Implement neutropenic guidelines  Outcome: Progressing     Problem: SAFETY ADULT  Goal: Patient will remain free of falls  Description  INTERVENTIONS:  - Assess patient frequently for physical needs  -  Identify cognitive and physical deficits and behaviors that affect risk of falls    -  [place] fall precautions as indicated by assessment   - Educate patient/family on patient safety including physical limitations  - Instruct patient to call for assistance with activity based on assessment  - Modify environment to reduce risk of injury  - Consider OT/PT consult to assist with strengthening/mobility  Outcome: Progressing  Goal: Maintain or return to baseline ADL function  Description  INTERVENTIONS:  -  Assess patient's ability to carry out ADLs; assess patient's baseline for ADL function and identify physical deficits which impact ability to perform ADLs (bathing, care of mouth/teeth, toileting, grooming, dressing, etc )  - Assess/evaluate cause of self-care deficits - Assess range of motion  - Assess patient's mobility; develop plan if impaired  - Assess patient's need for assistive devices and provide as appropriate  - Encourage maximum independence but intervene and supervise when necessary  ¯ Involve family in performance of ADLs  ¯ Assess for home care needs following discharge   ¯ Request OT consult to assist with ADL evaluation and planning for discharge  ¯ Provide patient education as appropriate  Outcome: Progressing  Goal: Maintain or return mobility status to optimal level  Description  INTERVENTIONS:  - Assess patient's baseline mobility status (ambulation, transfers, stairs, etc )    - Identify cognitive and physical deficits and behaviors that affect mobility  - Identify mobility aids required to assist with transfers and/or ambulation (gait belt, sit-to-stand, lift, walker, cane, etc )  - [place] fall precautions as indicated by assessment  - Record patient progress and toleration of activity level on Mobility SBAR; progress patient to next Phase/Stage  - Instruct patient to call for assistance with activity based on assessment  - Request Rehabilitation consult to assist with strengthening/weightbearing, etc   Outcome: Progressing     Problem: DISCHARGE PLANNING  Goal: Discharge to home or other facility with appropriate resources  Description  INTERVENTIONS:  - Identify barriers to discharge w/patient and caregiver  - Arrange for needed discharge resources and transportation as appropriate  - Identify discharge learning needs (meds, wound care, etc )  - Arrange for interpretive services to assist at discharge as needed  - Refer to Case Management Department for coordinating discharge planning if the patient needs post-hospital services based on physician/advanced practitioner order or complex needs related to functional status, cognitive ability, or social support system  Outcome: Progressing     Problem: POSTPARTUM  Goal: Experiences normal postpartum course  Description  INTERVENTIONS:  - Monitor maternal vital signs  - Assess uterine involution and lochia  Outcome: Progressing  Goal: Appropriate maternal -  bonding  Description  INTERVENTIONS:  - Identify family support  - Assess for appropriate maternal/infant bonding   -Encourage maternal/infant bonding opportunities  - Referral to  or  as needed  Outcome: Progressing  Goal: Establishment of infant feeding pattern  Description  INTERVENTIONS:  - Assess breast/bottle feeding  - Refer to lactation as needed  Outcome: Progressing  Goal: Incision(s), wounds(s) or drain site(s) healing without S/S of infection  Description  INTERVENTIONS  - Assess and document risk factors for skin impairment   - Assess and document dressing, incision, wound bed, drain sites and surrounding tissue  - Initiate Nutrition services consult and/or wound management as needed  Outcome: Progressing

## 2019-07-25 NOTE — PROGRESS NOTES
POSTPARTUM NOTE  Leann Reyes 20 y o  female MRN: [id number]  Unit/Bed#: -01 Encounter: [id number]    Date: 19  Procedure: Spontaneous Vaginal Delivery  Postpartum/Postop Day #: 2    SUBJECTIVE:  Patient seen and examined at bedside  PPD#2 s/p   She is doing well and has no complaints today  Would like to go home  Pain: no  Tolerating Oral Intake: yes   Voiding: yes  Flatus: yes  Bowel Movement: yes  Ambulating: yes  Breastfeeding: no  Chest Pain: no  Shortness of Breath: no  Leg Pain/Discomfort: no  Lochia: minimal    OBJECTIVE:   Vitals: Temp:  [97 7 °F (36 5 °C)-98 5 °F (36 9 °C)] 98 2 °F (36 8 °C)  HR:  [66-71] 66  Resp:  [16-20] 16  BP: (110-121)/(56-61) 110/59  General: No Acute Distress   Cardiovascular: Regular, Rate and Rhythm, no murmur, normal S1/S2   Lungs: Clear to Auscultation Bilaterally, no wheezing, non-labored breathing   Abdomen: Soft, non-distended, non-tender, no rebound, guarding or CVA tenderness   Fundus: Firm & Non-Tender, Fundal Location:  At the umbilicus  Lower Extremities: Non-tender, Edema Minimal    LABS / TESTS / MEDICATION:    Recent Results (from the past 72 hour(s))   Type and screen    Collection Time: 19  6:51 PM   Result Value Ref Range    ABO Grouping O     Rh Factor Positive     Antibody Screen Negative     Specimen Expiration Date 2019    CBC and Platelet    Collection Time: 19  6:51 PM   Result Value Ref Range    WBC 8 96 4 31 - 10 16 Thousand/uL    RBC 4 17 3 81 - 5 12 Million/uL    Hemoglobin 12 1 11 5 - 15 4 g/dL    Hematocrit 38 4 34 8 - 46 1 %    MCV 92 82 - 98 fL    MCH 29 0 26 8 - 34 3 pg    MCHC 31 5 31 4 - 37 4 g/dL    RDW 13 4 11 6 - 15 1 %    Platelets 254 831 - 390 Thousands/uL    MPV 10 8 8 9 - 12 7 fL   RPR    Collection Time: 19  6:51 PM   Result Value Ref Range    RPR Non-Reactive Non-Reactive   Rapid drug screen, urine    Collection Time: 19  6:51 PM   Result Value Ref Range    Amph/Meth UR Negative Negative Barbiturate Ur Negative Negative    Benzodiazepine Urine Negative Negative    Cocaine Urine Negative Negative    Methadone Urine Negative Negative    Opiate Urine Negative Negative    PCP Ur Negative Negative    THC Urine Negative Negative   Blood gas, arterial, cord    Collection Time: 07/23/19  8:50 AM   Result Value Ref Range    pH, Cord Art 7 259 7 230 - 7 430    pCO2, Cord Art 50 2 30 0 - 60 0    pO2, Cord Art 21 4 5 0 - 25 0 mm HG    HCO3, Cord Art 22 0 17 3 - 27 3 mmol/L    Base Exc, Cord Art -5 4 (L) 3 0 - 11 0 mmol/L    O2 Content, Cord Art 7 8 ml/dl    O2 Hgb, Arterial Cord 39 0 %   Blood gas, venous, cord    Collection Time: 07/23/19  8:50 AM   Result Value Ref Range    pH, Cord Rj 7 380 7 190 - 7 490    pCO2, Cord Rj 36 8 27 0 - 43 0 mm HG    pO2, Cord Rj 40 4 15 0 - 45 0 mm HG    HCO3, Cord Rj 21 3 12 2 - 28 6 mmol/L    Base Exc, Cord Rj -3 3 (L) 1 0 - 9 0 mmol/L    O2 Cont, Cord Rj 16 4 mL/dL    O2 HGB,VENOUS CORD 81 7 %         docusate sodium 100 mg Oral BID       acetaminophen 650 mg Q4H PRN   calcium carbonate 1,000 mg Daily PRN   diphenhydrAMINE 25 mg Q6H PRN   hydrocortisone 1 application PRN   ibuprofen 600 mg Q6H PRN   ondansetron 4 mg Q8H PRN   witch hazel-glycerin 1 pad PRN       ASSESSMENT:   20 y o  [id number] s/p Spontaneous Vaginal Delivery Postpartum day  2  PLAN:  1) Delivery: Continue routine postpartum care, encourage ambulation, advance diet as tolerated  2) Discharge home today    Signature / Title: Jose Obrien DO, Family Medicine  Date: 7/25/2019  Time: 5:49 AM

## 2019-07-31 LAB — PLACENTA IN STORAGE: NORMAL

## 2019-08-01 NOTE — UTILIZATION REVIEW
Notification of Maternity Inpatient Admission/Maternity Inpatient Authorization Request - Your request has been successfully submitted and the reference number is [id number]  This is a Notification of Maternity Inpatient Admission/Maternity Inpatient Authorization Request to our facility [facility] [place] [address]  Please be advised that this patient is currently in our facility under Inpatient Status  Below you will find the Birth/ Summary, Attending Physician and Facilitys information including NPI#  and contact information for the Utilization Review Department where the patient is receiving care services  Facility: [place] [address]  Address: [address] [place] [address]  Phone: [phone] Tax ID: [id number]  NPI: [id number]  MEDICARE ID: [id number]    Place of Service Code: 24   Place of Service Name: Inpatient Hospital  Presentation Date & Time: 2019  4:48 PM  Inpatient Admission Date & Time: 195  Discharge Date & Time: 2019 12:43 PM   Discharge Disposition (if discharged): Home/Self Care  Attending Physician & NPI: Kanika Gill, [facility] [[id number]]  Attending Physician:  LARRY Vo    [facility] ID- [id number]  [facility] [address] [place] [address]  Phone 1: [phone]  Phone 2: [phone]  Fax: [phone]  Mother of Puryear Information: Cassi Eye   MRN: [id number] YOB: 2000   Mother's Admitting Diagnosis: 39 weeks gestation of pregnancy [Z3A 41]  Estimated Date of Delivery: 19  Type of Delivery: Vaginal, Spontaneous    Delivering clinician: Kanika Gill   OB History        2    Para   2    Term   2       0    AB   0    Living   2       SAB   0    TAB   0    Ectopic   0    Multiple   0    Live Births   2               P[place]ear Name & MRN:   Information for the patient's :  Willis Fabio [[id number]]      Delivery Information:  Sex: male  Delivered 2019 9:00 AM by Vaginal, Spontaneous; Gestational Age: 37w0d     Measurements:  Weight: 7 lb 4 oz (3289 g); Height: 19"    APGAR 1 minute 5 minutes 10 minutes   Totals: 8 9      Thank you,  [address] [facility] Review Department  Phone: [phone]; Fax [phone]  ATTENTION: Please call with any questions or concerns to [phone]  and carefully follow the prompts so that you are directed to the right person  Send all requests for admission clinical reviews, approved or denied determinations and any other requests to fax [phone]   All voicemails are confidential

## 2019-08-06 ENCOUNTER — ANNUAL EXAM (OUTPATIENT)
Dept: FAMILY MEDICINE CLINIC | Facility: CLINIC | Age: 19
End: 2019-08-06
Payer: COMMERCIAL

## 2019-08-06 VITALS
HEART RATE: 72 BPM | SYSTOLIC BLOOD PRESSURE: 122 MMHG | RESPIRATION RATE: 18 BRPM | WEIGHT: 186 LBS | OXYGEN SATURATION: 97 % | BODY MASS INDEX: 34.02 KG/M2 | TEMPERATURE: 98.4 F | DIASTOLIC BLOOD PRESSURE: 57 MMHG

## 2019-08-06 PROCEDURE — 99213 OFFICE O/P EST LOW 20 MIN: CPT | Performed by: FAMILY MEDICINE

## 2019-08-06 NOTE — LETTER
[address] [place] [id number]  Dept: [phone]    August 6, 2019     Patient: Madhu Romero   YOB: 2000   Date of Visit: 8/6/2019       To Whom it May Concern:    Tressa Vivas is under my professional care  She was seen in the office from 8/6/2019  She may return to school on 9/4/2019 without limitations  If you have any questions or concerns, please don't hesitate to call           Sincerely,          Paulina Jara, DO

## 2019-08-06 NOTE — PROGRESS NOTES
Minnie Gomes is a 20 y o  female who presents for a postpartum visit  She is 2 weeks postpartum following a   I have fully reviewed the prenatal and intrapartum course  The delivery was at 39 gestational weeks  Outcome: vaginal birth after  ()  Prior c/s due to non-reassuring FHT  Anesthesia: epidural  Postpartum course has been uncomplicated  Baby's course has been uncomplicated  Baby is feeding by bottle - Enfamil with Iron  Bleeding staining only  Bowel function is normal  Bladder function is normal  Patient is not sexually active  Contraception method is abstinence  Postpartum depression screening: negative  Sent for scanning  Mother has no questions or concerns  She presents with her child for the child's 2 week well check  Doing well  Has regained birth weight  Father is present as well and is supportive  The following portions of the patient's history were reviewed and updated as appropriate: allergies, current medications, past family history, past medical history, past social history, past surgical history and problem list     Review of Systems  Pertinent items are noted in HPI       Objective     /57   Pulse 72   Temp 98 4 °F (36 9 °C)   Resp 18   Wt 84 4 kg (186 lb)   LMP 10/09/2018 (Approximate)   SpO2 97%   BMI 34 02 kg/m²    General:  alert and oriented, in no acute distress and cooperative    Breasts:  inspection negative, no nipple discharge or bleeding, no masses or nodularity palpable   Lungs: clear to auscultation bilaterally   Heart:  regular rate and rhythm, S1, S2 normal, no murmur, click, rub or gallop   Abdomen: soft, non-tender; bowel sounds normal; no masses,  no organomegaly and uterus nontender                             Assessment/Plan     Normal postpartum exam      1  Contraception: abstinence, however interested in scheduling for depo provera injection  Will call and make appointment  2  Postpartum depression screen negative  FOB is supportive  No questions about the infant  3  Follow up in: 4 weeks for 6 week postpartum check, or as needed  All patient questions & concerns were addressed  The patient agrees with her treatment plan  RTO 4 wk  Brittany Swift DO  08/09/19  8:35 AM    Some portions of this record may have been generated with voice recognition software  There may be translation, syntax, or grammatical errors  Occasional wrong word or "sound-a-like" substitutions may have occurred due to the inherent limitations of the voice recognition software  Read the chart carefully and recognize, using context, where substations may have occurred   If you have any questions, please contact the dictating provider for clarification or correction, as needed

## 2019-09-10 ENCOUNTER — TELEPHONE (OUTPATIENT)
Dept: FAMILY MEDICINE CLINIC | Facility: CLINIC | Age: 19
End: 2019-09-10

## 2019-09-12 ENCOUNTER — OFFICE VISIT (OUTPATIENT)
Dept: FAMILY MEDICINE CLINIC | Facility: CLINIC | Age: 19
End: 2019-09-12
Payer: COMMERCIAL

## 2019-09-12 VITALS
HEART RATE: 71 BPM | OXYGEN SATURATION: 97 % | SYSTOLIC BLOOD PRESSURE: 122 MMHG | WEIGHT: 178 LBS | BODY MASS INDEX: 32.56 KG/M2 | TEMPERATURE: 96.9 F | DIASTOLIC BLOOD PRESSURE: 60 MMHG

## 2019-09-12 DIAGNOSIS — Z30.09 ENCOUNTER FOR COUNSELING REGARDING CONTRACEPTION: Primary | ICD-10-CM

## 2019-09-12 PROCEDURE — 99212 OFFICE O/P EST SF 10 MIN: CPT | Performed by: FAMILY MEDICINE

## 2019-09-12 RX ORDER — MEDROXYPROGESTERONE ACETATE 150 MG/ML
150 INJECTION, SUSPENSION INTRAMUSCULAR
Qty: 1 ML | Refills: 3 | Status: SHIPPED | OUTPATIENT
Start: 2019-09-12

## 2019-09-13 ENCOUNTER — CLINICAL SUPPORT (OUTPATIENT)
Dept: FAMILY MEDICINE CLINIC | Facility: CLINIC | Age: 19
End: 2019-09-13
Payer: COMMERCIAL

## 2019-09-13 DIAGNOSIS — Z30.42 ENCOUNTER FOR DEPO-PROVERA CONTRACEPTION: Primary | ICD-10-CM

## 2019-09-13 LAB — SL AMB POCT URINE HCG: NEGATIVE

## 2019-09-13 PROCEDURE — 96372 THER/PROPH/DIAG INJ SC/IM: CPT

## 2019-09-13 PROCEDURE — 81025 URINE PREGNANCY TEST: CPT

## 2019-09-13 RX ORDER — MEDROXYPROGESTERONE ACETATE 150 MG/ML
150 INJECTION, SUSPENSION INTRAMUSCULAR
Status: SHIPPED | OUTPATIENT
Start: 2019-09-13

## 2019-09-13 RX ADMIN — MEDROXYPROGESTERONE ACETATE 150 MG: 150 INJECTION, SUSPENSION INTRAMUSCULAR at 15:09

## 2019-09-13 NOTE — PROGRESS NOTES
Patient came in for a depo shot  Negative pregnancy test, not sexually active in the last two weeks and currently on her period  Next depo shot is between Nov 29-Dec 13

## 2019-09-16 NOTE — PROGRESS NOTES
Assessment/Plan:    Encounter for counseling regarding contraception  - Will order Depo-Provera at this time  - Follow up prn      Subjective:      Patient ID: Katie Olivas is a 20 y o  female  HPI    This a 20year old female presenting for contraception counseling  Patient is about 8 weeks postpartum and wishes to restart Depo-Provera  She was previously on this for birth control and states she tolerated it well and preferred it over other methods of birth control as she stops getting her period  She states she is not breast feeding and only formula feeding her child  The following portions of the patient's history were reviewed and updated as appropriate: allergies, current medications, past family history, past medical history, past social history, past surgical history and problem list     Review of Systems   Constitutional: Negative for activity change, fatigue and fever  HENT: Negative for congestion, ear pain, sneezing and sore throat  Respiratory: Negative for cough, shortness of breath and wheezing  Cardiovascular: Negative for chest pain  Gastrointestinal: Negative for abdominal pain, constipation, diarrhea, nausea and vomiting  Skin: Negative  Neurological: Negative for dizziness and headaches  Objective:  /60 (BP Location: Left arm, Patient Position: Sitting, Cuff Size: Adult)   Pulse 71   Temp (!) 96 9 °F (36 1 °C) (Tympanic)   Wt 80 7 kg (178 lb)   LMP 09/01/2019 (Exact Date)   SpO2 97%   BMI 32 56 kg/m²      Physical Exam   Constitutional: She is oriented to person, place, and time  She appears well-developed and well-nourished  No distress  HENT:   Head: Normocephalic and atraumatic  Right Ear: External ear normal    Left Ear: External ear normal    Cardiovascular: Normal rate, regular rhythm and normal heart sounds  Exam reveals no gallop and no friction rub  No murmur heard    Pulmonary/Chest: Effort normal and breath sounds normal  No respiratory distress  She has no wheezes  She has no rales  Abdominal: Soft  Bowel sounds are normal  She exhibits no distension  There is no tenderness  There is no rebound and no guarding  Musculoskeletal: She exhibits no edema  Neurological: She is alert and oriented to person, place, and time  Skin: Skin is warm and dry  No rash noted  She is not diaphoretic  No erythema  No pallor  Psychiatric: She has a normal mood and affect  Her behavior is normal    Vitals reviewed

## 2019-12-02 ENCOUNTER — CLINICAL SUPPORT (OUTPATIENT)
Dept: FAMILY MEDICINE CLINIC | Facility: CLINIC | Age: 19
End: 2019-12-02
Payer: COMMERCIAL

## 2019-12-02 DIAGNOSIS — Z30.42 ENCOUNTER FOR DEPO-PROVERA CONTRACEPTION: Primary | ICD-10-CM

## 2019-12-02 PROCEDURE — 96372 THER/PROPH/DIAG INJ SC/IM: CPT

## 2019-12-02 RX ADMIN — MEDROXYPROGESTERONE ACETATE 150 MG: 150 INJECTION, SUSPENSION INTRAMUSCULAR at 15:47

## 2019-12-27 ENCOUNTER — OFFICE VISIT (OUTPATIENT)
Dept: FAMILY MEDICINE CLINIC | Facility: CLINIC | Age: 19
End: 2019-12-27
Payer: COMMERCIAL

## 2019-12-27 VITALS
BODY MASS INDEX: 30.55 KG/M2 | DIASTOLIC BLOOD PRESSURE: 70 MMHG | RESPIRATION RATE: 18 BRPM | TEMPERATURE: 97.7 F | WEIGHT: 166 LBS | HEART RATE: 90 BPM | SYSTOLIC BLOOD PRESSURE: 108 MMHG | OXYGEN SATURATION: 99 % | HEIGHT: 62 IN

## 2019-12-27 DIAGNOSIS — R05.9 COUGH: Primary | ICD-10-CM

## 2019-12-27 DIAGNOSIS — J06.9 URI, ACUTE: ICD-10-CM

## 2019-12-27 PROCEDURE — 3008F BODY MASS INDEX DOCD: CPT | Performed by: FAMILY MEDICINE

## 2019-12-27 PROCEDURE — 99213 OFFICE O/P EST LOW 20 MIN: CPT | Performed by: FAMILY MEDICINE

## 2019-12-27 NOTE — LETTER
December 27, 2019     Patient: Elana Alanis   YOB: 2000   Date of Visit: 12/27/2019       To Whom it May Concern:    Zara Eaton is under my professional care  She was seen in my office on 12/27/2019  She may return to work on 12/28/19  If you have any questions or concerns, please don't hesitate to call           Sincerely,          Goyo Landrum DO        CC: No Recipients

## 2019-12-27 NOTE — PROGRESS NOTES
[address] Note  [place], 18     Rian Sharp MRN: [id number] : 2000 Age: 20 y o  Assessment/Plan        1  Cough     2  URI, acute         Symptoms consistent with URI, continue supportive care measures including tylenol/motrin as needed, adequate hydration, good hygiene practices, work note provided per patient request  Follow up if symptoms worsen or fail to improve  Rian Sharp acknowledged understanding of treatment plan, all questions answered  Plan discussed with attending physician Dr Twin Britt  Mary Jane Grove is a 20 y o  female  Complains of cough for "I don't know how long", thinks she got sick after being at her child's brother's mother's house where there were many sick adults and children, or possibly when she was walking outside  States she doesn't want medication but wants a work note  Denies fevers/chills, admits to some congestion and runny nose, no shortness of breath or wheezing        The following portions of the patient's history were reviewed and updated as appropriate: allergies, current medications, past family history, past medical history, past social history, past surgical history and problem list       Review of Systems as above    Current Outpatient Medications   Medication Sig Dispense Refill    medroxyPROGESTERone (DEPO-PROVERA) 150 mg/mL injection Inject 1 mL (150 mg total) into a muscle every 3 (three) months 1 mL 3     Current Facility-Administered Medications   Medication Dose Route Frequency Provider Last Rate Last Dose    medroxyPROGESTERone (DEPO-PROVERA) IM injection 150 mg  150 mg Intramuscular Q3 Months Itz Aburto MD   150 mg at 19 1547     Objective      /70 (BP Location: Left arm, Patient Position: Sitting, Cuff Size: Standard)   Pulse 90   Temp 97 7 °F (36 5 °C) (Tympanic)   Resp 18   Ht 5' 2" (1 575 m)   Wt 75 3 kg (166 lb)   LMP 2019 (LMP Unknown) Comment: period started on 12/02/2019 to present  due to Depo  SpO2 99%   BMI 30 36 kg/m²     Physical Exam   Constitutional: She is oriented to person, place, and time  She appears well-developed and well-nourished  No distress  HENT:   Head: Normocephalic and atraumatic  Mouth/Throat: Oropharynx is clear and moist  No oropharyngeal exudate  Eyes: Conjunctivae are normal    Cardiovascular: Normal rate, regular rhythm and normal heart sounds  Pulmonary/Chest: Effort normal and breath sounds normal  No respiratory distress  She has no wheezes  Lymphadenopathy:     She has no cervical adenopathy  Neurological: She is alert and oriented to person, place, and time  Skin: Skin is warm and dry  She is not diaphoretic  Vitals reviewed  Some portions of this record may have been generated with voice recognition software  There may be translation, syntax, or grammatical errors  Occasional wrong word or "sound-a-like" substitutions may have occurred due to the inherent limitations of the voice recognition software  Read the chart carefully and recognize, using context, where substations may have occurred  If you have any questions, please contact the dictating provider for clarification or correction, as needed

## 2020-02-17 ENCOUNTER — CLINICAL SUPPORT (OUTPATIENT)
Dept: FAMILY MEDICINE CLINIC | Facility: CLINIC | Age: 20
End: 2020-02-17

## 2020-02-17 DIAGNOSIS — Z30.42 ENCOUNTER FOR DEPO-PROVERA CONTRACEPTION: Primary | ICD-10-CM

## 2020-02-17 DIAGNOSIS — Z30.42 ENCOUNTER FOR SURVEILLANCE OF INJECTABLE CONTRACEPTIVE: ICD-10-CM

## 2020-02-17 RX ADMIN — MEDROXYPROGESTERONE ACETATE 150 MG: 150 INJECTION, SUSPENSION INTRAMUSCULAR at 16:18

## 2020-07-31 ENCOUNTER — OFFICE VISIT (OUTPATIENT)
Dept: FAMILY MEDICINE CLINIC | Facility: CLINIC | Age: 20
End: 2020-07-31
Payer: COMMERCIAL

## 2020-07-31 VITALS
HEART RATE: 70 BPM | RESPIRATION RATE: 18 BRPM | DIASTOLIC BLOOD PRESSURE: 74 MMHG | WEIGHT: 173.5 LBS | SYSTOLIC BLOOD PRESSURE: 106 MMHG | BODY MASS INDEX: 30.74 KG/M2 | TEMPERATURE: 97.7 F | OXYGEN SATURATION: 98 % | HEIGHT: 63 IN

## 2020-07-31 DIAGNOSIS — Z72.0 TOBACCO USE: ICD-10-CM

## 2020-07-31 DIAGNOSIS — Z00.00 WELL ADULT EXAM: Primary | ICD-10-CM

## 2020-07-31 DIAGNOSIS — Z71.3 NUTRITIONAL COUNSELING: ICD-10-CM

## 2020-07-31 DIAGNOSIS — Z71.82 EXERCISE COUNSELING: ICD-10-CM

## 2020-07-31 DIAGNOSIS — J45.20 MILD INTERMITTENT ASTHMA WITHOUT COMPLICATION: ICD-10-CM

## 2020-07-31 PROCEDURE — 3008F BODY MASS INDEX DOCD: CPT | Performed by: FAMILY MEDICINE

## 2020-07-31 PROCEDURE — 99395 PREV VISIT EST AGE 18-39: CPT | Performed by: FAMILY MEDICINE

## 2020-07-31 RX ORDER — ALBUTEROL SULFATE 90 UG/1
2 AEROSOL, METERED RESPIRATORY (INHALATION) EVERY 6 HOURS PRN
Qty: 1 INHALER | Refills: 5 | Status: SHIPPED | OUTPATIENT
Start: 2020-07-31

## 2020-07-31 NOTE — PROGRESS NOTES
[facility] - Adult Wellness Exam   [facility], [place], [address]     Lesli Ribera MRN: [id number] : 2000 Age: 23 y o  Assessment/Plan        1  Well adult exam     2  Mild intermittent asthma without complication  albuterol (PROVENTIL HFA,VENTOLIN HFA) 90 mcg/act inhaler   3  BMI 30 0-30 9,adult     4  Exercise counseling     5  Nutritional counseling     6  Tobacco use         Patient overall doing well  Refill of inhaler sent given history of asthma  She is due for pap and will return for this  Counseled on healthy diet and exercise  Counseled on tobacco cessation  Lesli Ribera acknowledged understanding of plan, all questions answered  Plan discussed with attending physician Dr Lisha Yepez  Susan Corbett is a 23 y o  female, presenting today for wellness exam     Current concerns:   Overall, feeling well with no current concerns  She knows she is due for her first pap  Is unclear if she may have had one previously done by another doctor but no records are available  Diet & Exercise   Regular consumption of fruits & vegetables   Regular consumption of proteins   Junk food/fast food limited   Weekly Exercise: walks regularly "to [place] and back" almost every day   BMI: Body mass index is 30 73 kg/m²      Social   Marital Status: single, father of her baby is around but does not live with them  Household Members: her and 3 yo son  Employment Status: was previously at [employer] but stopped due Covid  Tobacco Use: cigarettes, 1-2 cigarettes per day     Alcohol Use: denies  Drug Use: denies    Screening & Preventative   Pap smear: due, this would be her first one     Immunizations     Immunization History   Administered Date(s) Administered    DTaP 5 1999, 1999, 2000, 10/10/2001, 2003    HPV Quadrivalent 10/17/2008, 2009, 2010    Hep A, adult 11/10/2009, 2010    Hep B, adult 1999, 1999, 1999    Hib (PRP-OMP) 1999, 1999, 2000, 10/10/2001    IPV 1999, 2000, 10/10/2001, 2003    Influenza Quadrivalent Preservative Free 3 years and older IM 10/20/2017    MMR 10/10/2001, 2003    Meningococcal, Unknown Serogroups 2010    Tdap 2010, 2019    Varicella 2000, 11/10/2009     Past Medical History:   Diagnosis Date    Allergic     Morphine    Anxiety     previously on meds Buspar    Arthritis     Possible Arthritis in knee    Asthma     Depression     Obesity     Psychiatric disorder     depression, anxiety       Past Surgical History:   Procedure Laterality Date     SECTION         Family History   Problem Relation Age of Onset    Substance Abuse Mother     Alcohol abuse Mother     Mental illness Father     Depression Father     Diabetes Maternal Grandmother     Diabetes Maternal Grandfather        Current Outpatient Medications   Medication Sig Dispense Refill    albuterol (PROVENTIL HFA,VENTOLIN HFA) 90 mcg/act inhaler Inhale 2 puffs every 6 (six) hours as needed for wheezing or shortness of breath 1 Inhaler 5    medroxyPROGESTERone (DEPO-PROVERA) 150 mg/mL injection Inject 1 mL (150 mg total) into a muscle every 3 (three) months (Patient not taking: Reported on 2020) 1 mL 3     Current Facility-Administered Medications   Medication Dose Route Frequency Provider Last Rate Last Dose    medroxyPROGESTERone (DEPO-PROVERA) IM injection 150 mg  150 mg Intramuscular Q3 Months Pauline Mcduffie MD   150 mg at 20 1618       Allergies   Allergen Reactions    Morphine Itching       Review of Systems   Review of Systems   Constitutional: Negative for appetite change, chills and fever  HENT: Negative for congestion, rhinorrhea and sore throat  Eyes: Negative for visual disturbance  Respiratory: Negative for cough, shortness of breath and wheezing      Cardiovascular: Negative for chest pain and leg swelling  Gastrointestinal: Negative for abdominal pain, constipation, diarrhea, nausea and vomiting  Genitourinary: Negative for dysuria and frequency  Musculoskeletal: Negative for arthralgias and myalgias  Skin: Negative for rash  Neurological: Negative for dizziness and headaches  The following portions of the patient's history were reviewed and updated as appropriate: allergies, current medications, past family history, past medical history, past social history, past surgical history and problem list     Objective      /74 (BP Location: Right arm, Patient Position: Sitting, Cuff Size: Large)   Pulse 70   Temp 97 7 °F (36 5 °C) (Tympanic)   Resp 18   Ht 5' 3" (1 6 m)   Wt 78 7 kg (173 lb 8 oz)   SpO2 98%   BMI 30 73 kg/m²     Physical Exam   Constitutional: She is oriented to person, place, and time  She appears well-developed and well-nourished  HENT:   Head: Normocephalic and atraumatic  Mouth/Throat: Oropharynx is clear and moist    Eyes: Conjunctivae and EOM are normal    Cardiovascular: Normal rate, regular rhythm and normal heart sounds  No murmur heard  Pulmonary/Chest: Effort normal and breath sounds normal  No respiratory distress  She has no wheezes  Abdominal: Soft  Bowel sounds are normal  She exhibits no distension  There is no tenderness  Musculoskeletal: Normal range of motion  She exhibits no edema or deformity  Neurological: She is alert and oriented to person, place, and time  Skin: Skin is warm and dry  Psychiatric: She has a normal mood and affect  Vitals reviewed  Some portions of this record may have been generated with voice recognition software  There may be translation, syntax, or grammatical errors  Occasional wrong word or "sound-a-like" substitutions may have occurred due to the inherent limitations of the voice recognition software  Read the chart carefully and recognize, using context, where substations may have occurred   If you have any questions, please contact the dictating provider for clarification or correction, as needed

## 2020-12-22 ENCOUNTER — TELEMEDICINE (OUTPATIENT)
Dept: FAMILY MEDICINE CLINIC | Facility: CLINIC | Age: 20
End: 2020-12-22
Payer: COMMERCIAL

## 2020-12-22 DIAGNOSIS — Z30.09 BIRTH CONTROL COUNSELING: ICD-10-CM

## 2020-12-22 DIAGNOSIS — N92.6 IRREGULAR MENSES: Primary | ICD-10-CM

## 2020-12-22 PROCEDURE — 99213 OFFICE O/P EST LOW 20 MIN: CPT | Performed by: FAMILY MEDICINE

## 2021-02-26 ENCOUNTER — TELEMEDICINE (OUTPATIENT)
Dept: FAMILY MEDICINE CLINIC | Facility: CLINIC | Age: 21
End: 2021-02-26
Payer: COMMERCIAL

## 2021-02-26 ENCOUNTER — HOSPITAL ENCOUNTER (EMERGENCY)
Facility: HOSPITAL | Age: 21
Discharge: HOME | End: 2021-02-26
Attending: EMERGENCY MEDICINE | Admitting: EMERGENCY MEDICINE
Payer: COMMERCIAL

## 2021-02-26 VITALS
RESPIRATION RATE: 19 BRPM | OXYGEN SATURATION: 99 % | HEART RATE: 96 BPM | TEMPERATURE: 98.8 F | DIASTOLIC BLOOD PRESSURE: 63 MMHG | SYSTOLIC BLOOD PRESSURE: 137 MMHG

## 2021-02-26 DIAGNOSIS — Z20.822 EXPOSURE TO COVID-19 VIRUS: Primary | ICD-10-CM

## 2021-02-26 DIAGNOSIS — B34.9 VIRAL INFECTION, UNSPECIFIED: ICD-10-CM

## 2021-02-26 DIAGNOSIS — B34.9 VIRAL ILLNESS: ICD-10-CM

## 2021-02-26 DIAGNOSIS — Z20.822 CLOSE EXPOSURE TO COVID-19 VIRUS: Primary | ICD-10-CM

## 2021-02-26 LAB
EXT PREG TEST URINE: NEGATIVE
EXT. CONTROL ED NAV: NORMAL

## 2021-02-26 PROCEDURE — 99284 EMERGENCY DEPT VISIT MOD MDM: CPT | Performed by: PHYSICIAN ASSISTANT

## 2021-02-26 PROCEDURE — 81025 URINE PREGNANCY TEST: CPT | Performed by: PHYSICIAN ASSISTANT

## 2021-02-26 PROCEDURE — 99283 EMERGENCY DEPT VISIT LOW MDM: CPT

## 2021-02-26 PROCEDURE — U0003 INFECTIOUS AGENT DETECTION BY NUCLEIC ACID (DNA OR RNA); SEVERE ACUTE RESPIRATORY SYNDROME CORONAVIRUS 2 (SARS-COV-2) (CORONAVIRUS DISEASE [COVID-19]), AMPLIFIED PROBE TECHNIQUE, MAKING USE OF HIGH THROUGHPUT TECHNOLOGIES AS DESCRIBED BY CMS-2020-01-R: HCPCS | Performed by: PHYSICIAN ASSISTANT

## 2021-02-26 PROCEDURE — 99213 OFFICE O/P EST LOW 20 MIN: CPT | Performed by: FAMILY MEDICINE

## 2021-02-26 PROCEDURE — U0005 INFEC AGEN DETEC AMPLI PROBE: HCPCS | Performed by: PHYSICIAN ASSISTANT

## 2021-02-26 NOTE — DISCHARGE INSTRUCTIONS

## 2021-02-26 NOTE — PROGRESS NOTES
COVID-19 Virtual Visit     Assessment/Plan:    Problem List Items Addressed This Visit     None      Visit Diagnoses     Exposure to COVID-19 virus    -  Primary    Relevant Orders    Novel Coronavirus (Covid-19),PCR [facility] - Collected at [facility]    Viral infection, unspecified        Relevant Orders    Novel Coronavirus (Covid-19),PCR [facility] - Collected at [facility]         Disposition:     I recommended self-quarantine for 10 days and to watch for symptoms until 14 days after exposure  If patient were to develop symptoms, they should self isolate and call our office for further guidance  I referred patient to one of our centralized sites for a COVID-19 swab  Instructed that patient's  would also have to quarantine 10 days after his exposure to the patient during her self-quarantine  I have spent 20 minutes directly with the patient  Greater than 50% of this time was spent in counseling/coordination of care regarding: instructions for management, patient and family education and impressions  Encounter provider Sin Isaacs MD    Provider located at [address] [place] [id number]    Recent Visits  No visits were found meeting these conditions  Showing recent visits within past 7 days and meeting all other requirements     Today's Visits  Date Type Provider Dept   02/26/21 Telemedicine MD Yuri Manzo   Showing today's visits and meeting all other requirements     Future Appointments  No visits were found meeting these conditions  Showing future appointments within next 150 days and meeting all other requirements        Patient agrees to participate in a virtual check in via telephone or video visit instead of presenting to the office to address urgent/immediate medical needs  Patient is aware this is a billable service      After connecting through Telephone, the patient was identified by name and date of birth  Juany Kumari was informed that this was a telemedicine visit and that the exam was being conducted confidentially over secure lines  My office door was closed  No one else was in the room  Juany Kumari acknowledged consent and understanding of privacy and security of the telemedicine visit  I informed the patient that I have reviewed her record in Epic and presented the opportunity for her to ask any questions regarding the visit today  The patient agreed to participate  It was my intent to perform this visit via video technology but the patient was not able to do a video connection so the visit was completed via audio telephone only  Subjective:   Juany Kumari is a 23 y o  female who is concerned about COVID-19  Patient's symptoms include nasal congestion, anosmia, loss of taste and cough  Patient denies fever, chills (inititally had chills), fatigue, sore throat, shortness of breath, chest tightness, abdominal pain, nausea (nauseaous in the beginning), myalgias and headaches       Date of symptom onset: 2/22/2021  Date of exposure: 2/18/2021    Exposure:   Contact with a person who is under investigation (PUI) for or who is positive for COVID-19 within the last 14 days?: Yes    Hospitalized recently for fever and/or lower respiratory symptoms?: No      Currently a healthcare worker that is involved in direct patient care?: No      Works in a special setting where the risk of COVID-19 transmission may be high? (this may include long-term care, correctional and [facility]CI facilities; homeless shelters; assisted-living facilities and group homes ): No      Resident in a special setting where the risk of COVID-19 transmission may be high? (this may include long-term care, correctional and [facility] facilities; homeless shelters; assisted-living facilities and group homes ): No      No results found for: [address] [facility] [address], [address] [facility], [address] [facility], [address]  Past Medical History:   Diagnosis Date    Allergic     Morphine    Anxiety     previously on meds Buspar    Arthritis     Possible Arthritis in knee    Asthma     Depression     Obesity     Psychiatric disorder     depression, anxiety     Past Surgical History:   Procedure Laterality Date     SECTION       Current Outpatient Medications   Medication Sig Dispense Refill    albuterol (PROVENTIL HFA,VENTOLIN HFA) 90 mcg/act inhaler Inhale 2 puffs every 6 (six) hours as needed for wheezing or shortness of breath 1 Inhaler 5    medroxyPROGESTERone (DEPO-PROVERA) 150 mg/mL injection Inject 1 mL (150 mg total) into a muscle every 3 (three) months (Patient not taking: Reported on 2020) 1 mL 3     Current Facility-Administered Medications   Medication Dose Route Frequency Provider Last Rate Last Admin    medroxyPROGESTERone (DEPO-PROVERA) IM injection 150 mg  150 mg Intramuscular Q3 Months Valentino Shorter, MD   150 mg at 20 1618     Allergies   Allergen Reactions    Morphine Itching       Review of Systems   Constitutional: Negative for chills (inititally had chills), fatigue and fever  HENT: Positive for congestion  Negative for sore throat  Respiratory: Positive for cough  Negative for chest tightness and shortness of breath  Gastrointestinal: Negative for abdominal pain and nausea (nauseaous in the beginning)  Musculoskeletal: Negative for myalgias  Neurological: Negative for headaches  Objective: There were no vitals filed for this visit  Physical Exam unable to assess over the phone  VIRTUAL VISIT 15 Seda Jacobsen acknowledges that she has consented to an online visit or consultation   She understands that the online visit is based solely on information provided by her, and that, in the absence of a face-to-face physical evaluation by the physician, the diagnosis she receives is both limited and provisional in terms of accuracy and completeness  This is not intended to replace a full medical face-to-face evaluation by the physician  Abdias Perez understands and accepts these terms

## 2021-02-26 NOTE — ED PROVIDER NOTES
History  Chief Complaint   Patient presents with    Cold Like Symptoms     Body aches, SOB  states 'it was a regular cold since monday, and now I cant smell or taste nothing' states contact with COVID + patient - friend was dx on       21 y/o female presenting with cough, congestion, generalized body aches and fatigue as well as loss of taste and smell over the past few days  States that she had a very close contact with the Gee patient has a shared the same blunt  Here with family members with similar symptoms  States that she has asthma and is occasionally short of breath however is not had any calf pain or swelling, denies chest pain, nausea vomiting, diarrhea, abdominal pain, palpitations  Prior to Admission Medications   Prescriptions Last Dose Informant Patient Reported? Taking? albuterol (PROVENTIL HFA,VENTOLIN HFA) 90 mcg/act inhaler   No No   Sig: Inhale 2 puffs every 6 (six) hours as needed for wheezing or shortness of breath   medroxyPROGESTERone (DEPO-PROVERA) 150 mg/mL injection   No No   Sig: Inject 1 mL (150 mg total) into a muscle every 3 (three) months   Patient not taking: Reported on 2020      Facility-Administered Medications Last Administration Doses Remaining   medroxyPROGESTERone (DEPO-PROVERA) IM injection 150 mg 2020  4:18 PM           Past Medical History:   Diagnosis Date    Allergic     Morphine    Anxiety     previously on meds Buspar    Arthritis     Possible Arthritis in knee    Asthma     Depression     Obesity     Psychiatric disorder     depression, anxiety       Past Surgical History:   Procedure Laterality Date     SECTION         Family History   Problem Relation Age of Onset    Substance Abuse Mother     Alcohol abuse Mother     Mental illness Father     Depression Father     Diabetes Maternal Grandmother     Diabetes Maternal Grandfather      I have reviewed and agree with the history as documented      E-Cigarette/Vaping  E-Cigarette Use Never User      E-Cigarette/Vaping Substances     Social History     Tobacco Use    Smoking status: Current Every Day Smoker     Packs/day: 1 00     Types: Cigarettes    Smokeless tobacco: Never Used   Substance Use Topics    Alcohol use: No     Frequency: Never     Binge frequency: Less than monthly    Drug use: Yes     Types: Cocaine, Marijuana     Comment: hx marijuana use 4/2017 - states no cocaine currently        Review of Systems   Constitutional: Positive for chills and fatigue  Negative for activity change, appetite change, diaphoresis, fever and unexpected weight change  HENT: Positive for congestion and sore throat  Negative for dental problem, drooling, ear discharge, ear pain, facial swelling, hearing loss, mouth sores, nosebleeds, postnasal drip, rhinorrhea, sinus pressure, sinus pain, sneezing, tinnitus, trouble swallowing and voice change  Eyes: Negative  Respiratory: Positive for cough  Negative for apnea, choking, chest tightness, shortness of breath, wheezing and stridor  Cardiovascular: Negative  Gastrointestinal: Negative  Genitourinary: Negative  Musculoskeletal: Positive for myalgias  Negative for arthralgias, back pain, gait problem, joint swelling, neck pain and neck stiffness  Skin: Negative  Neurological: Negative  All other systems reviewed and are negative  Physical Exam  Physical Exam  Vitals signs and nursing note reviewed  Constitutional:       General: She is not in acute distress  Appearance: She is well-developed  She is not diaphoretic  HENT:      Head: Normocephalic and atraumatic  Right Ear: External ear normal       Left Ear: External ear normal       Nose: Nose normal       Mouth/Throat:      Pharynx: No oropharyngeal exudate  Eyes:      General: No scleral icterus  Right eye: No discharge  Left eye: No discharge        Conjunctiva/sclera: Conjunctivae normal       Pupils: Pupils are equal, round, and reactive to light  Neck:      Musculoskeletal: Normal range of motion and neck supple  Cardiovascular:      Rate and Rhythm: Normal rate and regular rhythm  Pulses: Normal pulses  Heart sounds: Normal heart sounds  No murmur  No friction rub  No gallop  Pulmonary:      Effort: Pulmonary effort is normal  No respiratory distress  Breath sounds: Normal breath sounds  No stridor  No wheezing, rhonchi or rales  Comments: S PO2 is 99% indicating adequate oxygenation  Chest:      Chest wall: No tenderness  Abdominal:      General: Abdomen is flat  Bowel sounds are normal  There is no distension  Palpations: Abdomen is soft  There is no mass  Tenderness: There is no abdominal tenderness  There is no guarding or rebound  Hernia: No hernia is present  Musculoskeletal:      Comments: No calf swelling or asymmetries, no calf tenderness  Lymphadenopathy:      Cervical: No cervical adenopathy  Skin:     General: Skin is warm and dry  Capillary Refill: Capillary refill takes less than 2 seconds  Coloration: Skin is not pale  Findings: No erythema or rash  Neurological:      General: No focal deficit present  Mental Status: She is alert and oriented to person, place, and time  Mental status is at baseline     Psychiatric:         Mood and Affect: Mood normal          Vital Signs  ED Triage Vitals [02/26/21 1655]   Temperature Pulse Respirations Blood Pressure SpO2   98 8 °F (37 1 °C) 96 19 137/63 99 %      Temp Source Heart Rate Source Patient Position - Orthostatic VS BP Location FiO2 (%)   Oral Monitor Sitting Left arm --      Pain Score       --           Vitals:    02/26/21 1655   BP: 137/63   Pulse: 96   Patient Position - Orthostatic VS: Sitting         Visual Acuity      ED Medications  Medications - No data to display    Diagnostic Studies  Results Reviewed     Procedure Component Value Units Date/Time    Novel Coronavirus (Covid-19),PCR [facility] [[id number]] Collected: 02/26/21 1742    Lab Status: In process Specimen: Nares from Nose Updated: 02/26/21 1748    POCT pregnancy, urine [[id number]]  (Normal) Resulted: 02/26/21 1745    Lab Status: Final result Updated: 02/26/21 1745     EXT PREG TEST UR (Ref: Negative) negative     Control valid                 No orders to display              Procedures  Procedures         ED Course                                           MDM  Number of Diagnoses or Management Options  Close exposure to COVID-19 virus:   Viral illness:   Diagnosis management comments: Patient requesting a pregnancy test   Discussed with patient that she likely does have COVID and maintain proper precautions  Given thorough education regarding this  Patient is informed to return to the emergency department for worsening of symptoms and was given proper education regarding their diagnosis and symptoms  Otherwise the patient is informed to follow up with their primary care doctor for re-evaluation  The patient verbalizes understanding and agrees with above assessment and plan  All questions were answered  Please Note: Fluency Direct voice recognition software may have been used in the creation of this document  Wrong words or sound a like substitutions may have occurred due to the inherent limitations of the voice software             Amount and/or Complexity of Data Reviewed  Clinical lab tests: ordered and reviewed  Review and summarize past medical records: yes  Independent visualization of images, tracings, or specimens: yes        Disposition  Final diagnoses:   Close exposure to COVID-19 virus   Viral illness     Time reflects when diagnosis was documented in both MDM as applicable and the Disposition within this note     Time User Action Codes Description Comment    2/26/2021  5:19 PM Mirta Francisco Add [Z20 822] Close exposure to COVID-19 virus     2/26/2021  5:19 PM Mirta Francisco Add [B34 9] Viral illness       ED Disposition     ED Disposition Condition Date/Time Comment    Discharge Stable Fri Feb 26, 2021  5:19 PM Cem Juares discharge to home/self care  Follow-up Information     Follow up With Specialties Details Why Contact Info Additional Information    [address] [facility] Emergency Medicine Go to  If symptoms worsen such as difficulty breathing, calf pain or swelling etc, otherwise please follow up with your family doctor [address] [address] [address] [address] Emergency Department, [place], [place], [address]          Discharge Medication List as of 2/26/2021  5:19 PM      CONTINUE these medications which have NOT CHANGED    Details   albuterol (PROVENTIL HFA,VENTOLIN HFA) 90 mcg/act inhaler Inhale 2 puffs every 6 (six) hours as needed for wheezing or shortness of breath, Starting Fri 7/31/2020, Normal      medroxyPROGESTERone (DEPO-PROVERA) 150 mg/mL injection Inject 1 mL (150 mg total) into a muscle every 3 (three) months, Starting Thu 9/12/2019, Normal           No discharge procedures on file      PDMP Review     None          ED Provider  Electronically Signed by           Mreedith Lal PA-C  02/26/21 0461

## 2021-02-27 LAB — SARS-COV-2 N GENE RESP QL NAA+PROBE: POSITIVE

## 2021-10-11 ENCOUNTER — HOSPITAL ENCOUNTER (EMERGENCY)
Facility: HOSPITAL | Age: 21
Discharge: HOME | End: 2021-10-11
Attending: EMERGENCY MEDICINE
Payer: COMMERCIAL

## 2021-10-11 VITALS
BODY MASS INDEX: 36.85 KG/M2 | SYSTOLIC BLOOD PRESSURE: 131 MMHG | RESPIRATION RATE: 20 BRPM | TEMPERATURE: 97.9 F | WEIGHT: 208 LBS | HEART RATE: 85 BPM | DIASTOLIC BLOOD PRESSURE: 86 MMHG

## 2021-10-11 DIAGNOSIS — B34.9 VIRAL ILLNESS: Primary | ICD-10-CM

## 2021-10-11 PROCEDURE — 99283 EMERGENCY DEPT VISIT LOW MDM: CPT

## 2021-10-11 PROCEDURE — 99284 EMERGENCY DEPT VISIT MOD MDM: CPT | Performed by: PHYSICIAN ASSISTANT

## 2021-10-11 PROCEDURE — U0003 INFECTIOUS AGENT DETECTION BY NUCLEIC ACID (DNA OR RNA); SEVERE ACUTE RESPIRATORY SYNDROME CORONAVIRUS 2 (SARS-COV-2) (CORONAVIRUS DISEASE [COVID-19]), AMPLIFIED PROBE TECHNIQUE, MAKING USE OF HIGH THROUGHPUT TECHNOLOGIES AS DESCRIBED BY CMS-2020-01-R: HCPCS | Performed by: PHYSICIAN ASSISTANT

## 2021-10-11 PROCEDURE — U0005 INFEC AGEN DETEC AMPLI PROBE: HCPCS | Performed by: PHYSICIAN ASSISTANT

## 2021-10-12 LAB — SARS-COV-2 RNA RESP QL NAA+PROBE: NEGATIVE

## 2021-11-05 ENCOUNTER — VBI (OUTPATIENT)
Dept: ADMINISTRATIVE | Facility: OTHER | Age: 21
End: 2021-11-05

## 2022-03-25 NOTE — OB LABOR/OXYTOCIN SAFETY PROGRESS
Oxytocin Safety Progress Check Note - Leatha Allan 20 y o  female MRN: [id number]    Unit/Bed#: -01 Encounter: [id number]    OB History    Para Term  AB Living   2 1 1 0 0 1   SAB TAB Ectopic Multiple Live Births   0 0 0 0 1     Gestational Age: 41w0d  Dose (carly-units/min) Oxytocin: 10 carly-units/min  Contraction Frequency (minutes): 1-3 5  Contraction Quality: Moderate  Tachysystole: No   Dilation: Lip/rim (Comment)        Effacement (%): 90  Station: 0  Baseline Rate: 120 bpm  Fetal Heart Rate: 118 BPM  FHR Category: Category II        Notes/comments: Patient reports pressure  Abrupt cervical change resulting in prolonged decel with category 2 tracing  Huddle was called  Pit turned off and scalp electrode was placed  FHT returned to Cat 1  Continue to monitor off of pitocin            Jose Guadalupe Maguire MD 2019 5:49 AM sutures

## 2022-04-11 ENCOUNTER — TELEPHONE (OUTPATIENT)
Dept: FAMILY MEDICINE CLINIC | Facility: CLINIC | Age: 22
End: 2022-04-11

## 2022-10-10 ENCOUNTER — OFFICE VISIT (OUTPATIENT)
Dept: OBGYN CLINIC | Facility: CLINIC | Age: 22
End: 2022-10-10
Payer: COMMERCIAL

## 2022-10-10 VITALS — SYSTOLIC BLOOD PRESSURE: 90 MMHG | BODY MASS INDEX: 37.2 KG/M2 | WEIGHT: 210 LBS | DIASTOLIC BLOOD PRESSURE: 60 MMHG

## 2022-10-10 DIAGNOSIS — Z11.3 SCREENING FOR STDS (SEXUALLY TRANSMITTED DISEASES): Primary | ICD-10-CM

## 2022-10-10 DIAGNOSIS — Z01.419 ENCOUNTER FOR WELL WOMAN EXAM WITH ROUTINE GYNECOLOGICAL EXAM: ICD-10-CM

## 2022-10-10 PROCEDURE — 99395 PREV VISIT EST AGE 18-39: CPT | Performed by: STUDENT IN AN ORGANIZED HEALTH CARE EDUCATION/TRAINING PROGRAM
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
## 2022-10-12 ENCOUNTER — TELEPHONE (OUTPATIENT)
Dept: OBGYN CLINIC | Facility: CLINIC | Age: 22
End: 2022-10-12

## 2022-10-12 DIAGNOSIS — B96.89 BACTERIAL VAGINOSIS: Primary | ICD-10-CM

## 2022-10-12 DIAGNOSIS — N76.0 BACTERIAL VAGINOSIS: Primary | ICD-10-CM

## 2022-10-12 LAB
A VAGINAE DNA VAG QL NAA+PROBE: ABNORMAL SCORE
BVAB2 DNA VAG QL NAA+PROBE: ABNORMAL SCORE
C ALBICANS DNA VAG QL NAA+PROBE: NEGATIVE
C GLABRATA DNA VAG QL NAA+PROBE: NEGATIVE
MEGA1 DNA VAG QL NAA+PROBE: ABNORMAL SCORE
T VAGINALIS RRNA SPEC QL NAA+PROBE: NEGATIVE

## 2022-10-12 RX ORDER — METRONIDAZOLE 500 MG/1
500 TABLET ORAL EVERY 12 HOURS SCHEDULED
Qty: 14 TABLET | Refills: 0 | Status: SHIPPED | OUTPATIENT
Start: 2022-10-12 | End: 2022-10-19

## 2022-10-12 NOTE — RESULT ENCOUNTER NOTE
Genital culture consistent with bacterial vaginosis- flagyl 500mg PO BID for 7 days sent to pharmacy  Called patient and reviewed results and medications sent  All questions answered      Sushil Brower MD  10/12/22

## 2022-10-13 LAB
HBV SURFACE AG SERPL QL IA: NEGATIVE
HIV 1+2 AB+HIV1 P24 AG SERPL QL IA: NON REACTIVE
RPR SER QL: NON REACTIVE

## 2022-10-14 LAB
C TRACH RRNA CVX QL NAA+PROBE: NEGATIVE
CYTOLOGIST CVX/VAG CYTO: NORMAL
DX ICD CODE: NORMAL
N GONORRHOEA RRNA CVX QL NAA+PROBE: NEGATIVE
OTHER STN SPEC: NORMAL
OTHER STN SPEC: NORMAL
PATH REPORT.FINAL DX SPEC: NORMAL
SL AMB NOTE:: NORMAL
SL AMB SPECIMEN ADEQUACY: NORMAL
SL AMB TEST METHODOLOGY: NORMAL

## 2023-05-17 ENCOUNTER — INITIAL PRENATAL (OUTPATIENT)
Dept: OBGYN CLINIC | Facility: CLINIC | Age: 23
End: 2023-05-17

## 2023-05-17 ENCOUNTER — TELEPHONE (OUTPATIENT)
Facility: HOSPITAL | Age: 23
End: 2023-05-17

## 2023-05-17 VITALS
WEIGHT: 216 LBS | SYSTOLIC BLOOD PRESSURE: 106 MMHG | BODY MASS INDEX: 38.27 KG/M2 | HEIGHT: 63 IN | DIASTOLIC BLOOD PRESSURE: 74 MMHG

## 2023-05-17 DIAGNOSIS — Z34.81 PRENATAL CARE, SUBSEQUENT PREGNANCY, FIRST TRIMESTER: Primary | ICD-10-CM

## 2023-05-17 DIAGNOSIS — O21.9 NAUSEA/VOMITING IN PREGNANCY: ICD-10-CM

## 2023-05-17 DIAGNOSIS — Z36.89 ENCOUNTER FOR OTHER SPECIFIED ANTENATAL SCREENING: ICD-10-CM

## 2023-05-17 DIAGNOSIS — Z11.3 SCREENING EXAMINATION FOR VENEREAL DISEASE: ICD-10-CM

## 2023-05-17 RX ORDER — ONDANSETRON 4 MG/1
4 TABLET, FILM COATED ORAL EVERY 8 HOURS PRN
Qty: 20 TABLET | Refills: 0 | Status: SHIPPED | OUTPATIENT
Start: 2023-05-17

## 2023-05-17 RX ORDER — CALCIUM CARBONATE 300MG(750)
TABLET,CHEWABLE ORAL
Qty: 30 TABLET | Refills: 11 | Status: SHIPPED | OUTPATIENT
Start: 2023-05-17

## 2023-05-17 NOTE — PROGRESS NOTES
Deedee Bartholomew is a 24 y o   with 1 C/S and 1   Presents today with her partner for her OB intake/physical  Her LMP was 3/16/23 (8w 6d)  This was a unplanned pregnancy  She is experiencing nausea and vomiting  She desires another vaginal delivery  States she was induced at 39 weeks with the first pregnancy for suspected IUGR  She is employed at [employer] and plans to continue working  Dental care is not current  Educated on importance of routine dental health maintenance  She wears her seatbelt  Last pap smear: 10/10/22 - NILM    Past Medical History:   Diagnosis Date   • Allergic     Morphine   • Anxiety     previously on meds Buspar   • Arthritis     Possible Arthritis in knee   • Asthma    • Depression    • Obesity    • Psychiatric disorder     depression, anxiety       Past Surgical History:   Procedure Laterality Date   •  SECTION         Social History: Denies tobacco, ETOH and drug use  States she has not smoked marijuana in a long time  Pets: None    Reviewed the following educational topics with patient:                -routine prenatal visit/ultrasound/labwork schedule   - genetic screening options - SQS, NIPS   - TDAP/Influenza              -nutritional demands of pregnancy, healthy dietary habits              -listeria, seafood precautions              -weight gain expectations (based on pre-pregnant BMI)              -exercise, rest, and sexual activity during pregnancy              -abstinence from alcohol, tobacco, and illegal drugs              -common discomforts of pregnancy and appropriate management              -OTC medications safe to use in pregnancy              -Currently receiving WIC              -symptoms to report to OB provider                          -signs of PTL                          -vaginal bleeding/leaking of fluid                          -severe n/v-unable to tolerate ANY food/fluids for more than 24 hours    No hx of MRSA  No travel plans  Initial prenatal labs ordered including 1 hr gtt for increased BMI  Referral for the  center placed for genetic screening  She will schedule an appt  Prenatal course of care reviewed with patient  Schedule for prenatal care visits signed  OB physical documented in episode  TAUS: Viable IUP measuring 9 w 0 days    Size = dates  JAIME set based on LMP  Orestes Bernal was seen today for initial prenatal visit  Diagnoses and all orders for this visit:    Prenatal care, subsequent pregnancy, first trimester  -     Ct, Ng, Trich vag by NIKOLAY    Encounter for other specified  screening  -     HIV 1/2 AG/AB w Reflex [facility] for 2 yr old and above; Future  -     Cancel: UA (URINE) with reflex to Scope  -     Urine culture; Future  -     Hepatitis B surface antigen; Future  -     CBC and differential; Future  -     Rubella antibody, IgG; Future  -     Type and screen; Future  -     RPR-Syphilis Screening (Total Syphilis IGG/IGM); Future  -     Hepatitis B surface antibody; Future  -     Anemia Panel w/Reflex, OB; Future  -     Ambulatory Referral to Maternal Fetal Medicine; Future  -     Cancel: Ct, Ng, Trich vag by NIKOLAY; Future  -     Urine culture  -     Hepatitis B surface antigen  -     CBC and differential  -     Rubella antibody, IgG  -     Type and screen  -     Hepatitis B surface antibody  -     Cancel: Ct, Ng, Trich vag by NIKOLAY  -     Prenatal MV-Min-FA-Omega-3 (Prenatal Gummies/DHA & FA) 0 4-32 5 MG CHEW; 1 chewable daily   -     UA (URINE) with reflex to Scope  -     Ct, Ng, Trich vag by NIKOLAY  -     Glucose, 1H PG; Future  -     Glucose, 1H PG    Nausea/vomiting in pregnancy  -     ondansetron (ZOFRAN) 4 mg tablet; Take 1 tablet (4 mg total) by mouth every 8 (eight) hours as needed for nausea or vomiting    Screening examination for venereal disease  -     Ct, Ng, Trich vag by NIKOLAY      Zofran prescribed for nausea  RTO in 4 weeks for PNV or sooner if needed

## 2023-05-17 NOTE — TELEPHONE ENCOUNTER
Called patient to schedule MFM appointment, based on referral issued to Maternal Fetal Medicine by [facility] office  Left voicemail requesting patient to call back and schedule appointment, with office number for return call [phone]

## 2023-05-19 ENCOUNTER — TELEPHONE (OUTPATIENT)
Dept: PERINATAL CARE | Facility: OTHER | Age: 23
End: 2023-05-19

## 2023-05-19 LAB
C TRACH RRNA SPEC QL NAA+PROBE: NEGATIVE
N GONORRHOEA RRNA SPEC QL NAA+PROBE: NEGATIVE
T VAGINALIS RRNA SPEC QL NAA+PROBE: NEGATIVE

## 2023-05-19 NOTE — TELEPHONE ENCOUNTER
"Spoke with patient 5/16 at 0915 to sched NT/DT  When speaking with patient she stated, \"this pregnancy is not like my other pregnancies  \"  Patient was not feeling well so she was instructed to reach out to her OB if she has further concerns, patient understood     "

## 2023-06-12 ENCOUNTER — TELEPHONE (OUTPATIENT)
Facility: HOSPITAL | Age: 23
End: 2023-06-12

## 2023-06-12 NOTE — TELEPHONE ENCOUNTER
Patient left a voicemail at 2:49 stating she needed to schedule an appointment  Returned patients call  Left voicemail stating I was returning her call and requested she give our office a call back at [phone]

## 2023-06-13 LAB
APPEARANCE UR: CLEAR
BILIRUB UR QL STRIP: NEGATIVE
COLOR UR: YELLOW
GLUCOSE UR QL: NEGATIVE
HGB UR QL STRIP: NEGATIVE
KETONES UR QL STRIP: NEGATIVE
LEUKOCYTE ESTERASE UR QL STRIP: NEGATIVE
MICRO URNS: NORMAL
NITRITE UR QL STRIP: NEGATIVE
PH UR STRIP: 5.5 [PH] (ref 5–7.5)
PROT UR QL STRIP: NEGATIVE
SP GR UR: 1.03 (ref 1–1.03)
UROBILINOGEN UR STRIP-ACNC: 0.2 MG/DL (ref 0.2–1)

## 2023-06-14 LAB
ABO GROUP BLD: NORMAL
BACTERIA UR CULT: NORMAL
BASOPHILS # BLD AUTO: 0 X10E3/UL (ref 0–0.2)
BASOPHILS # BLD AUTO: 0 X10E3/UL (ref 0–0.2)
BASOPHILS NFR BLD AUTO: 0 %
BASOPHILS NFR BLD AUTO: 0 %
BLD GP AB SCN SERPL QL: NEGATIVE
EOSINOPHIL # BLD AUTO: 0.1 X10E3/UL (ref 0–0.4)
EOSINOPHIL # BLD AUTO: 0.1 X10E3/UL (ref 0–0.4)
EOSINOPHIL NFR BLD AUTO: 2 %
EOSINOPHIL NFR BLD AUTO: 2 %
ERYTHROCYTE [DISTWIDTH] IN BLOOD BY AUTOMATED COUNT: 12.1 % (ref 11.7–15.4)
ERYTHROCYTE [DISTWIDTH] IN BLOOD BY AUTOMATED COUNT: 12.2 % (ref 11.7–15.4)
FERRITIN SERPL-MCNC: 36 NG/ML (ref 15–150)
FOLATE SERPL-MCNC: 12 NG/ML
GLUCOSE 1H P 50 G GLC PO SERPL-MCNC: 110 MG/DL (ref 70–139)
HBV SURFACE AB SER-ACNC: <3.1 MIU/ML
HBV SURFACE AG SERPL QL IA: NEGATIVE
HCT VFR BLD AUTO: 36.4 % (ref 34–46.6)
HCT VFR BLD AUTO: 36.8 % (ref 34–46.6)
HGB BLD-MCNC: 12.2 G/DL (ref 11.1–15.9)
HGB BLD-MCNC: 12.4 G/DL (ref 11.1–15.9)
HIV 1+2 AB+HIV1 P24 AG SERPL QL IA: NON REACTIVE
IMM GRANULOCYTES # BLD: 0 X10E3/UL (ref 0–0.1)
IMM GRANULOCYTES # BLD: 0 X10E3/UL (ref 0–0.1)
IMM GRANULOCYTES NFR BLD: 0 %
IMM GRANULOCYTES NFR BLD: 0 %
IRON SATN MFR SERPL: 20 % (ref 15–55)
IRON SERPL-MCNC: 69 UG/DL (ref 27–159)
LYMPHOCYTES # BLD AUTO: 1.8 X10E3/UL (ref 0.7–3.1)
LYMPHOCYTES # BLD AUTO: 1.8 X10E3/UL (ref 0.7–3.1)
LYMPHOCYTES NFR BLD AUTO: 25 %
LYMPHOCYTES NFR BLD AUTO: 25 %
Lab: NORMAL
MCH RBC QN AUTO: 29.3 PG (ref 26.6–33)
MCH RBC QN AUTO: 29.8 PG (ref 26.6–33)
MCHC RBC AUTO-ENTMCNC: 33.5 G/DL (ref 31.5–35.7)
MCHC RBC AUTO-ENTMCNC: 33.7 G/DL (ref 31.5–35.7)
MCV RBC AUTO: 88 FL (ref 79–97)
MCV RBC AUTO: 89 FL (ref 79–97)
MONOCYTES # BLD AUTO: 0.3 X10E3/UL (ref 0.1–0.9)
MONOCYTES # BLD AUTO: 0.3 X10E3/UL (ref 0.1–0.9)
MONOCYTES NFR BLD AUTO: 4 %
MONOCYTES NFR BLD AUTO: 4 %
NEUTROPHILS # BLD AUTO: 4.8 X10E3/UL (ref 1.4–7)
NEUTROPHILS # BLD AUTO: 4.8 X10E3/UL (ref 1.4–7)
NEUTROPHILS NFR BLD AUTO: 69 %
NEUTROPHILS NFR BLD AUTO: 69 %
PLATELET # BLD AUTO: 221 X10E3/UL (ref 150–450)
PLATELET # BLD AUTO: 221 X10E3/UL (ref 150–450)
RBC # BLD AUTO: 4.16 X10E6/UL (ref 3.77–5.28)
RBC # BLD AUTO: 4.16 X10E6/UL (ref 3.77–5.28)
RETICS/RBC NFR AUTO: 1.5 % (ref 0.6–2.6)
RH BLD: POSITIVE
RPR SER QL: NON REACTIVE
RUBV IGG SERPL IA-ACNC: 17.4 INDEX
TIBC SERPL-MCNC: 353 UG/DL (ref 250–450)
UIBC SERPL-MCNC: 284 UG/DL (ref 131–425)
VIT B12 SERPL-MCNC: 373 PG/ML (ref 232–1245)
WBC # BLD AUTO: 7 X10E3/UL (ref 3.4–10.8)
WBC # BLD AUTO: 7.1 X10E3/UL (ref 3.4–10.8)

## 2023-06-15 PROBLEM — O42.90 PROM (PREMATURE RUPTURE OF MEMBRANES): Status: RESOLVED | Noted: 2019-07-22 | Resolved: 2023-06-15

## 2023-06-15 PROBLEM — Z3A.13 13 WEEKS GESTATION OF PREGNANCY: Status: ACTIVE | Noted: 2023-06-15

## 2023-06-16 ENCOUNTER — TELEPHONE (OUTPATIENT)
Facility: HOSPITAL | Age: 23
End: 2023-06-16

## 2023-06-16 NOTE — TELEPHONE ENCOUNTER
Spoke w/male at [employer] to [employer] up for upcoming appt   6/20 at [facility] at 3 pm, TAYLOR p/u is at 2 pm

## 2023-06-20 ENCOUNTER — ROUTINE PRENATAL (OUTPATIENT)
Dept: PERINATAL CARE | Facility: CLINIC | Age: 23
End: 2023-06-20
Payer: COMMERCIAL

## 2023-06-20 VITALS
HEART RATE: 69 BPM | HEIGHT: 63 IN | DIASTOLIC BLOOD PRESSURE: 66 MMHG | WEIGHT: 208.2 LBS | BODY MASS INDEX: 36.89 KG/M2 | SYSTOLIC BLOOD PRESSURE: 118 MMHG

## 2023-06-20 DIAGNOSIS — O35.BXX0 ANOMALY OF HEART OF FETUS AFFECTING PREGNANCY, ANTEPARTUM, SINGLE OR UNSPECIFIED FETUS: Primary | ICD-10-CM

## 2023-06-20 DIAGNOSIS — Z36.82 ENCOUNTER FOR NUCHAL TRANSLUCENCY TESTING: ICD-10-CM

## 2023-06-20 DIAGNOSIS — Z3A.13 13 WEEKS GESTATION OF PREGNANCY: ICD-10-CM

## 2023-06-20 DIAGNOSIS — Z36.9 UNSPECIFIED ANTENATAL SCREENING: ICD-10-CM

## 2023-06-20 DIAGNOSIS — Z36.89 ENCOUNTER FOR OTHER SPECIFIED ANTENATAL SCREENING: ICD-10-CM

## 2023-06-20 PROCEDURE — 76813 OB US NUCHAL MEAS 1 GEST: CPT | Performed by: OBSTETRICS & GYNECOLOGY

## 2023-06-20 PROCEDURE — 36415 COLL VENOUS BLD VENIPUNCTURE: CPT | Performed by: OBSTETRICS & GYNECOLOGY

## 2023-06-20 PROCEDURE — 99203 OFFICE O/P NEW LOW 30 MIN: CPT | Performed by: OBSTETRICS & GYNECOLOGY

## 2023-06-20 PROCEDURE — 76801 OB US < 14 WKS SINGLE FETUS: CPT | Performed by: OBSTETRICS & GYNECOLOGY

## 2023-06-20 NOTE — PROGRESS NOTES
Salas Marrufo presents today for a genetic screening ultrasound  This is her third pregnancy  She has a history of a  section  followed by a vaginal birth after  section  She has an elevated BMI  There is a family history of diabetes  There is no other significant contributory history  Today's ultrasound findings demonstrate cardiac axis and four-chamber view dextra rotated with the entire heart on the right side of the chest   The stomach is on the left side of the chest   No other abnormalities are appreciated or suspected  She was informed of today's findings and all of her questions answered to apparent satisfaction  I suggest an early echocardiogram with pediatric cardiology to further elucidate this finding  We discussed the options for genetic screening, including but not limited to first trimester screening, second trimester screening, combined first and second trimester screening, noninvasive prenatal screening (NIPS) for patients at high risk and diagnostic screening through the use of CVS and amniocentesis  We discussed the risks and benefits of each approach including the sensitivities and false positive rates as well as the difference between a screening test and a diagnostic test  At the conclusion of our discussion the patient elected noninvasive prenatal testing utilizing the [facility] Non-invasive prenatal screening (NIPS) test  The patient had this blood work drawn in the office and the results should be available approximately 7-10 days after her blood draw  Her results will be [facility] from [facility]  We discussed follow-up in detail and I recommend an early anatomy ultrasound be scheduled for 16 weeks gestation in conjunction with her echocardiogram     Thank you very much for allowing us to participate in the care of this very nice patient  Should you have any questions, please do not hesitate to contact me      Portions of the record may have been created with voice "recognition software  Occasional wrong word or \"sound a like\" substitutions may have occurred due to the inherent limitations of voice recognition software  Read the chart carefully and recognize, using context, where substitutions have occurred    "

## 2023-06-20 NOTE — PROGRESS NOTES
"Patient chose to have [facility] Non-invasive Prenatal Screen with fetal sex  Patient given brochure and is aware [facility] will contact their insurance and coordinate coverage  Patient made aware she will need to respond to text message or e-mail from [employer] within 2 business days or testing will be run through insurance  Patient informed text message will come from area code  \"415\"  Provided [employer] # [phone] and web site : [email or web address]  \"Melber your test online\" card with barcode and test tube ID provided to patient  Reviewed [facility]'s web site states 5-7 business days for results via their portal    [employer] message will be sent to patient when MFM receives results /provider reviews  2 vials of blood drawn from right arm by Judy Hastings  Patient tolerated blood draw without difficulty  Specimens labeled with patient identifiers (name, date of birth, specimen collection date), order and specimen were verified with patient, packed and sent via [employer]  FED EX  tracking #  [id number]  Copy of lab order scanned to Epic media  Maternal Fetal Medicine will have results in approximately 7-10 business days and will call patient or notify via [address]  Patient aware viewing lab result online will reveal fetal sex if ordered  Patient verbalized understanding of all instructions and no questions at this time    "

## 2023-06-20 NOTE — LETTER
2023     Isabela Kilgore, [address] [place] [address]    Patient: Enzo Goddard   YOB: 2000   Date of Visit: 2023       Dear Dr Dagoberto Clark: Thank you for referring Elijah Castillo to me for evaluation  Below are my notes for this consultation  If you have questions, please do not hesitate to call me  I look forward to following your patient along with you  Sincerely,        Gale Castañeda MD        CC: MD Gale Torres MD  2023  4:36 PM  Sign when Signing Visit  Christa Bennett presents today for a genetic screening ultrasound  This is her third pregnancy  She has a history of a  section  followed by a vaginal birth after  section  She has an elevated BMI  There is a family history of diabetes  There is no other significant contributory history  Today's ultrasound findings demonstrate cardiac axis and four-chamber view dextra rotated with the entire heart on the right side of the chest   The stomach is on the left side of the chest   No other abnormalities are appreciated or suspected  She was informed of today's findings and all of her questions answered to apparent satisfaction  I suggest an early echocardiogram with pediatric cardiology to further elucidate this finding  We discussed the options for genetic screening, including but not limited to first trimester screening, second trimester screening, combined first and second trimester screening, noninvasive prenatal screening (NIPS) for patients at high risk and diagnostic screening through the use of CVS and amniocentesis   We discussed the risks and benefits of each approach including the sensitivities and false positive rates as well as the difference between a screening test and a diagnostic test  At the conclusion of our discussion the patient elected noninvasive prenatal testing utilizing the [facility] Non-invasive prenatal screening "(NIPS) test  The patient had this blood work drawn in the office and the results should be available approximately 7-10 days after her blood draw  Her results will be reported from [facility]  We discussed follow-up in detail and I recommend an early anatomy ultrasound be scheduled for 16 weeks gestation in conjunction with her echocardiogram     Thank you very much for allowing us to participate in the care of this very nice patient  Should you have any questions, please do not hesitate to contact me  Portions of the record may have been created with voice recognition software  Occasional wrong word or \"sound a like\" substitutions may have occurred due to the inherent limitations of voice recognition software  Read the chart carefully and recognize, using context, where substitutions have occurred         "

## 2023-06-23 ENCOUNTER — INITIAL PRENATAL (OUTPATIENT)
Dept: OBGYN CLINIC | Facility: CLINIC | Age: 23
End: 2023-06-23
Payer: COMMERCIAL

## 2023-06-23 VITALS — WEIGHT: 205.4 LBS | DIASTOLIC BLOOD PRESSURE: 72 MMHG | BODY MASS INDEX: 36.38 KG/M2 | SYSTOLIC BLOOD PRESSURE: 110 MMHG

## 2023-06-23 DIAGNOSIS — Z3A.14 14 WEEKS GESTATION OF PREGNANCY: ICD-10-CM

## 2023-06-23 DIAGNOSIS — O35.BXX0 ANOMALY OF HEART OF FETUS AFFECTING PREGNANCY, ANTEPARTUM, SINGLE OR UNSPECIFIED FETUS: Primary | ICD-10-CM

## 2023-06-23 LAB
SL AMB  POCT GLUCOSE, UA: NORMAL
SL AMB LEUKOCYTE ESTERASE,UA: NORMAL
SL AMB POCT BILIRUBIN,UA: NORMAL
SL AMB POCT BLOOD,UA: NORMAL
SL AMB POCT KETONES,UA: NORMAL
SL AMB POCT NITRITE,UA: NORMAL
SL AMB POCT PH,UA: 6
SL AMB POCT SPECIFIC GRAVITY,UA: 1.02
SL AMB POCT URINE PROTEIN: NORMAL
SL AMB POCT UROBILINOGEN: NORMAL

## 2023-06-23 PROCEDURE — 81002 URINALYSIS NONAUTO W/O SCOPE: CPT | Performed by: STUDENT IN AN ORGANIZED HEALTH CARE EDUCATION/TRAINING PROGRAM

## 2023-06-23 PROCEDURE — OBC: Performed by: STUDENT IN AN ORGANIZED HEALTH CARE EDUCATION/TRAINING PROGRAM

## 2023-06-23 NOTE — PATIENT INSTRUCTIONS
Pregnancy at 11 to 14 Weeks   AMBULATORY CARE:   Changes happening to your body: You are now at the end of your first trimester and entering your second trimester  Morning sickness usually goes away by this time  You may have other symptoms such as fatigue, frequent urination, and headaches  You may have gained 2 to 4 pounds by now  Seek care immediately if:   You have pain or cramping in your abdomen or low back  You have heavy vaginal bleeding or clotting  You pass material that looks like tissue or large clots  Collect the material and bring it with you  Call your doctor or obstetrician if:   You cannot keep food or drinks down, and you are losing weight  You have light vaginal bleeding  You have chills or a fever  You have vaginal itching, burning, or pain  You have yellow, green, white, or foul-smelling vaginal discharge  You have pain or burning when you urinate, less urine than usual, or pink or bloody urine  You have questions or concerns about your condition or care  How to care for yourself at this stage of your pregnancy:       Get plenty of rest   You may feel more tired than normal  You may need to take naps or go to bed earlier  Manage nausea and vomiting  Avoid fatty and spicy foods  Eat small meals throughout the day instead of large meals  Betsy may help to decrease nausea  Ask your healthcare provider about other ways of decreasing nausea and vomiting  Eat a variety of healthy foods  Healthy foods include fruits, vegetables, whole-grain breads, low-fat dairy foods, beans, lean meats, and fish  Drink liquids as directed  Ask how much liquid to drink each day and which liquids are best for you  Limit caffeine to less than 200 milligrams each day  Limit your intake of fish to 2 servings each week  Choose fish low in mercury such as canned light tuna, shrimp, salmon, cod, or tilapia   Do not  eat fish high in mercury such as swordfish, tilefish, viry mackerel, and shark  Take prenatal vitamins as directed  Your need for certain vitamins and minerals, such as folic acid, increases during pregnancy  Prenatal vitamins provide some of the extra vitamins and minerals you need  Prenatal vitamins may also help to decrease the risk of certain birth defects  Do not smoke  Smoking increases your risk of a miscarriage and other health problems during your pregnancy  Smoking can cause your baby to be born too early or weigh less at birth  Ask your healthcare provider for information if you need help quitting  Do not drink alcohol  Alcohol passes from your body to your baby through the placenta  It can affect your baby's brain development and cause fetal alcohol syndrome (FAS)  FAS is a group of conditions that causes mental, behavior, and growth problems  Talk to your healthcare provider before you take any medicines  Many medicines may harm your baby if you take them when you are pregnant  Do not take any medicines, vitamins, herbs, or supplements without first talking to your healthcare provider  Never use illegal or street drugs (such as marijuana or cocaine) while you are pregnant  Safety tips during pregnancy:   Avoid hot tubs and saunas  Do not use a hot tub or sauna while you are pregnant, especially during your first trimester  Hot tubs and saunas may raise your baby's temperature and increase the risk of birth defects  Avoid toxoplasmosis  This is an infection caused by eating raw meat or being around infected cat feces  It can cause birth defects, miscarriages, and other problems  Wash your hands after you touch raw meat  Make sure any meat is well-cooked before you eat it  Avoid raw eggs and unpasteurized milk  Use gloves or ask someone else to clean your cat's litter box while you are pregnant  Changes happening with your baby: Your baby has fully formed fingernails and toenails  Your baby's heartbeat can now be heard   Ask your healthcare provider if you can listen to your baby's heartbeat  By week 14, your baby is over 4 inches long from the top of the head to the rump (baby's bottom)  Your baby weighs over 3 ounces  Prenatal care:  Prenatal care is a series of visits with your healthcare provider throughout your pregnancy  During the first 28 weeks of your pregnancy, you will see your healthcare provider 1 time each month  Prenatal care can help prevent problems during pregnancy and childbirth  Your healthcare provider will check your blood pressure and weight  Your baby's heart rate will also be checked  You may also need the following at some visits:  A pelvic exam  allows your healthcare provider to see your cervix (the bottom part of your uterus)  Your healthcare provider will use a speculum to open your vagina  He or she will check the size and shape of your uterus  Blood tests  may be done to check for any of the following:    Gestational diabetes or anemia (low iron level)    Blood type or Rh factor, or certain birth defects    Immunity to certain diseases, such as chickenpox or rubella    An infection, such as a sexually transmitted infection, HIV, or hepatitis B    Hepatitis B  may need to be prevented or treated  Hepatitis B is inflammation of the liver caused by the hepatitis B virus (HBV)  HBV can spread from a mother to her baby during delivery  You will be checked for HBV as early as possible in the first trimester of each pregnancy  You need the test even if you received the hepatitis B vaccine or were tested before  You may need to have an HBV infection treated before you give birth  Urine tests  may also be done to check for sugar and protein  These can be signs of gestational diabetes or preeclampsia  Urine tests may also be done to check for signs of infection  A fetal ultrasound  shows pictures of your baby inside your uterus  The pictures are used to check your baby's development, movement, and position  Genetic disorder screening tests  may be offered to you  These tests check your baby's risk for genetic disorders such as Down syndrome  A screening test includes a blood test and ultrasound  Follow up with your doctor or obstetrician as directed:  Go to all prenatal visits  Write down your questions so you remember to ask them during your visits  © Copyright Coretta Bacon 2022 Information is for End User's use only and may not be sold, redistributed or otherwise used for commercial purposes  The above information is an  only  It is not intended as medical advice for individual conditions or treatments  Talk to your doctor, nurse or pharmacist before following any medical regimen to see if it is safe and effective for you

## 2023-06-23 NOTE — PROGRESS NOTES
Joyce Pagan is a 25 yo  at 14w1d presenting for routine [facility]- reports occasional left sided cramping- denies any LOF or VB  Reports some N/V- declines medications  Is holding down food and fluids  Reviewed lifestyle changes  Pap completed on 10/2022- NILM and STD testing recently completed 2023 and was negative for GC/CT and Trichomoniasis  Had first trimester screening completed- NIPT is pending- on 13 week ultrasound there was a finding of dextra-rotated heart- she has a follow up echo scheduled for 2023- I encouraged her to reach out with any questions or concerns  She declined invasive testing at this time  She also has an early anatomy scan scheduled for 16 weeks gestation  RTO in 4 weeks or sooner if needed

## 2023-07-11 ENCOUNTER — TELEPHONE (OUTPATIENT)
Dept: OBGYN CLINIC | Facility: CLINIC | Age: 23
End: 2023-07-11

## 2023-07-11 ENCOUNTER — ULTRASOUND (OUTPATIENT)
Facility: HOSPITAL | Age: 23
End: 2023-07-11
Payer: COMMERCIAL

## 2023-07-11 ENCOUNTER — ROUTINE PRENATAL (OUTPATIENT)
Dept: PEDIATRIC CARDIOLOGY | Age: 23
End: 2023-07-11
Payer: COMMERCIAL

## 2023-07-11 DIAGNOSIS — Z3A.16 16 WEEKS GESTATION OF PREGNANCY: ICD-10-CM

## 2023-07-11 DIAGNOSIS — O35.BXX0 ANOMALY OF HEART OF FETUS AFFECTING PREGNANCY, ANTEPARTUM, SINGLE OR UNSPECIFIED FETUS: Primary | ICD-10-CM

## 2023-07-11 DIAGNOSIS — O02.1 FETAL DEMISE BEFORE 20 WEEKS WITH RETENTION OF DEAD FETUS: ICD-10-CM

## 2023-07-11 PROCEDURE — 76816 OB US FOLLOW-UP PER FETUS: CPT | Performed by: OBSTETRICS & GYNECOLOGY

## 2023-07-11 PROCEDURE — 99213 OFFICE O/P EST LOW 20 MIN: CPT | Performed by: OBSTETRICS & GYNECOLOGY

## 2023-07-11 PROCEDURE — 99205 OFFICE O/P NEW HI 60 MIN: CPT | Performed by: PEDIATRICS

## 2023-07-11 NOTE — TELEPHONE ENCOUNTER
Please call patient to follow up and to see how she is doing. She may need an appointment coming up with a  Doctor to discuss next steps.

## 2023-07-11 NOTE — TELEPHONE ENCOUNTER
Called patient to speak to her in regards to her recent loss. Told patient that we are here for her if she needs anything. Patient is unsure about how she wants to proceed right now but will give us a call when she is ready.

## 2023-07-11 NOTE — PROGRESS NOTES
Jf Peggy presents today for a fetal echocardiogram with Dr. Vickey Hendrickson. Dr. Gilbert Christianson could not appreciate fetal cardiac activity and I confirmed that there is a fetal demise. Biometry was performed. She was informed of today's findings and all questions answered to apparent satisfaction. She was appropriately upset and we discussed that there was likely a significant abnormality given concerns for heterotaxy in the first trimester. I reviewed with the patient that I would recommend strong consideration of a MicroArray for genetics evaluation at the time of dilation and evacuation. Dr. Fany Barkley was notified of today's findings and will schedule follow-up shortly. Thank you very much for allowing us to participate in the care of this very nice patient. Should you have any questions, please do not hesitate to contact our office.

## 2023-07-12 ENCOUNTER — HOSPITAL ENCOUNTER (EMERGENCY)
Facility: HOSPITAL | Age: 23
Discharge: HOME | End: 2023-07-12
Attending: EMERGENCY MEDICINE
Payer: COMMERCIAL

## 2023-07-12 VITALS
RESPIRATION RATE: 20 BRPM | HEART RATE: 67 BPM | TEMPERATURE: 98.9 F | WEIGHT: 210.1 LBS | DIASTOLIC BLOOD PRESSURE: 64 MMHG | BODY MASS INDEX: 37.22 KG/M2 | OXYGEN SATURATION: 98 % | SYSTOLIC BLOOD PRESSURE: 116 MMHG

## 2023-07-12 DIAGNOSIS — Z76.89 ENCOUNTER FOR ASSESSMENT FOR FETAL DEMISE: Primary | ICD-10-CM

## 2023-07-12 LAB
ABO GROUP BLD: NORMAL
RH BLD: POSITIVE

## 2023-07-12 PROCEDURE — 84702 CHORIONIC GONADOTROPIN TEST: CPT | Performed by: EMERGENCY MEDICINE

## 2023-07-12 PROCEDURE — 86901 BLOOD TYPING SEROLOGIC RH(D): CPT | Performed by: EMERGENCY MEDICINE

## 2023-07-12 PROCEDURE — 36415 COLL VENOUS BLD VENIPUNCTURE: CPT | Performed by: EMERGENCY MEDICINE

## 2023-07-12 PROCEDURE — 86900 BLOOD TYPING SEROLOGIC ABO: CPT | Performed by: EMERGENCY MEDICINE

## 2023-07-12 NOTE — PROGRESS NOTES
Fetal Cardiology Consult    Date of Visit:    7/11/2023  Gestational Age:  13w9d   Estimated Date of Delivery:    12/21/23     Reason for consultation: dextrocardia    There was no heartbeat and there was arrest of circulation noted on ultrasound. I discussed the unfortunate findings with Tyrese Weir and had Dr. Radha Rivers discuss next steps related to the fetal demise. He took further ultrasound measurements, counseled Tyrese Weir and coordinated care with her OB team.    [facility], MD  Pediatric Cardiology  [address] [place] [address]  Fax: [phone]  [email or web address]

## 2023-07-13 ENCOUNTER — OFFICE VISIT (OUTPATIENT)
Dept: OBGYN CLINIC | Facility: CLINIC | Age: 23
End: 2023-07-13
Payer: COMMERCIAL

## 2023-07-13 VITALS
WEIGHT: 210.8 LBS | BODY MASS INDEX: 37.34 KG/M2 | OXYGEN SATURATION: 97 % | DIASTOLIC BLOOD PRESSURE: 68 MMHG | SYSTOLIC BLOOD PRESSURE: 116 MMHG | HEART RATE: 86 BPM

## 2023-07-13 DIAGNOSIS — O03.9 FETAL DEMISE DUE TO MISCARRIAGE: Primary | ICD-10-CM

## 2023-07-13 DIAGNOSIS — Z01.818 PREOPERATIVE EXAM FOR GYNECOLOGIC SURGERY: ICD-10-CM

## 2023-07-13 PROBLEM — J45.20 MILD INTERMITTENT ASTHMA WITHOUT COMPLICATION: Status: ACTIVE | Noted: 2023-07-13

## 2023-07-13 LAB — B-HCG SERPL-ACNC: 3962 MIU/ML (ref 0–5)

## 2023-07-13 PROCEDURE — 99213 OFFICE O/P EST LOW 20 MIN: CPT | Performed by: STUDENT IN AN ORGANIZED HEALTH CARE EDUCATION/TRAINING PROGRAM

## 2023-07-13 NOTE — ED PROVIDER NOTES
Final Diagnosis:  1. Encounter for assessment for fetal demise        Chief Complaint   Patient presents with   • Abdominal Pain Pregnant     Almost 17 weeks pregnant,, was told by previous ultrasound that fetus had heart defect, had another ultrasound yesterday which showed no activity, was in disbelief. She states she used her own ultrasound gadget which she heart a heartbeat about 150 bpm and was feeling kicks. Wants to be sure what is really going on       HPI  Patient is 16 wk from LMP. She had US available on [employer] showing :  "Katrin Hernandez presents today for a fetal echocardiogram with Dr. Trupti Ashton. Dr. Cassie Huffman could not appreciate fetal cardiac activity and I confirmed that there is a fetal demise. Biometry was performed. .. She was appropriately upset and we discussed that there was likely a significant abnormality given concerns for heterotaxy in the first trimester. I reviewed with the patient that I would recommend strong consideration of a MicroArray for genetics evaluation at the time of dilation and evacuation."    After this visit (, yest) she felt like she felt movement and detected HR by home device. Here on bedside US there is no fetal movement nor cardiac activity c/w above ultrasound. She notes no vaginal d/c. No fever/chills. No vaginal bleeding. Soft abd w/ pressure from US probe. No n/v. No urinary symptoms. Still has appendix and gall bladder, only prior surgery Csection. She's . One prior FT vaginal delivery and one prior FT c section delivery uncomplicated. EMS reports if applicable: N/A     - Previous charting underwent limited review with attention to last ED visits, labs, ekgs, and prior imaging.   Chart review reveals :     Initial Prenatal on 2023   Component Date Value Ref Range Status   • LEUKOCYTE ESTERASE,UA 2023 neg   Final   • NITRITE,UA 2023 neg   Final   • SL AMB POCT UROBILINOGEN 2023 trace   Final   • POCT URINE PROTEIN 2023 neg Final   •  PH,UA 2023 6.0   Final   • BLOOD,UA 2023 neg   Final   • SPECIFIC GRAVITY,UA 2023 1.025   Final   • Jeramy Pallas 2023 neg   Final   • BILIRUBIN,UA 2023 neg   Final   • GLUCOSE, UA 2023 neg   Final       - No language barrier.   - History obtained from patient . - There are no limitations to the history obtained. - Discuss patient's care, with patient permission or by chart review, with      PMH:   has a past medical history of Allergic, Anxiety, Arthritis, Asthma, Depression, Obesity, and Psychiatric disorder. PSH:   has a past surgical history that includes  section. Social History:  Tobacco Use: Medium Risk (2023)    Patient History    • Smoking Tobacco Use: Former    • Smokeless Tobacco Use: Never    • Passive Exposure: Not on file     Alcohol Use: Not At Risk (2021)    AUDIT-C    • Frequency of Alcohol Consumption: Never    • Average Number of Drinks: Not on file    • Frequency of Binge Drinking: Less than monthly     No illicit use       ROS:  Pertinent positives/negatives: Estanislado Terra Some ROS may be present in the HPI and would take precedent over these standard questions asked below. Review of Systems   Gastrointestinal: Positive for abdominal distention. Psychiatric/Behavioral: Positive for dysphoric mood. CONSTITUTIONAL:  No lethargy. No weakness. No unexpected weight loss. No appetite change. EYES:  No pain, redness, or discharge. No loss of vision. No orbital trauma or pain. ENT:  No tinnitus or decreased hearing. No epistaxis/purulent rhinorrhea. No voice change, airway closing, trismus. CARDIOVASCULAR:  No chest pain. No skin mottling or pallor. No change in exertional capacity  RESPIRATORY:  No hemoptysis. No paroxysmal nocturnal dyspnea. No stridor. No audible wheezing. No production with cough. GASTROINTESTINAL:  Normal appetite. No vomiting, diarrhea. No pain. No bloating. No melena. No hematochezia. GENITOURINARY:  No frequency, urgency, nocturia. No hematuria or dysuria. No discharge. No sores/adenopathy. MUSCULOSKELETAL:  No arthralgias or myalgias that are new. No new deformity. INTEGUMENTARY:  No swelling. No unexpected contusions. No abrasions. No lymphangitis. NEUROLOGIC:  No meningismus. No new numbness of the extremities. No new focal weakness. No postural instability  PSYCHIATRIC:  No SI HI AVH  HEMATOLOGICAL:  No bleeding. No petechiae. No bruising. ALLERGIES:  No urticaria. No sudden abd cramping. No stridor. PE:     Physical exam highlights:   Physical Exam       Vitals:    07/12/23 2134 07/12/23 2335   BP: 132/60 116/64   BP Location: Right arm Right arm   Pulse: 91 67   Resp: 20 20   Temp: 98.9 °F (37.2 °C)    TempSrc: Tympanic    SpO2: 99% 98%   Weight: 95.3 kg (210 lb 1.6 oz)      Vitals reviewed by me. Nursing note reviewed  Chaperone present for all sensitive exam.  Const: No acute distress. Alert. Nontoxic. Not diaphoretic. HEENT: External ears normal. No protrusion drainage swelling. Nose normal. No drainage/traumatic deformity. MMM. Mouth with baseline/symmetric movement. No trismus. Eyes: No squinting. No icterus. No tearing/swelling/drainage. Tracks through the room with normal EOM. Neck: ROM normal. No rigidity. No meningismus. Cards: Rate as per vitals Compared to monitor sinus unless documented. Regular Well perfused. Pulm: able to verbalize without additional effort. Effort and excursion normal. No distress. No audible wheezing/ stridor. Normal resp rate without retraction or change in work of breathing. Abd: No distension beyond expected given gravid uterus. No fluctuant wave. Patient without peritoneal pain with shifting/bumping the bed. MSK: ROM normal baseline. No deformity. No contractures from baseline. Skin: No new rashes visible. Well perfused. No wounds visualized on exposed skin  Neuro: Nonfocal. Baseline. CN grossly intact.  Moving all four with coordination. Psych: Normal behavior and affect. Understands what's happening. No expressed SI HI. Somewhat flat affected about the situation, appropriately. A:  - Nursing note reviewed. Ddx and MDM  Considered diagnoses    Confirm IUD    Check ABORh. No need for rhogam  F/u OB for d&e. Return fever chills heavy bleeding or abnormal vaginal d/c. My read of the XR/CT scan reveals:  NA   No orders to display       Orders Placed This Encounter   Procedures   • hCG, quantitative   • ABO/Rh     Labs Reviewed   ABO/RH       Result Value Ref Range Status    ABO Grouping O   Final    Rh Factor Positive   Final       *Each of these labs was reviewed. Particular standout labs will be noted in the ED Course above     Final Diagnosis:  1. Encounter for assessment for fetal demise          P:  - hospital tx includes   Medications - No data to display      - disposition  Time reflects when diagnosis was documented in both MDM as applicable and the Disposition within this note     Time User Action Codes Description Comment    7/12/2023 11:28 PM Verlan Curling Add [Z76.89] Encounter for assessment for fetal demise       ED Disposition     ED Disposition   Discharge    Condition   Stable    Date/Time   Wed Jul 12, 2023 11:28 PM    [address] discharge to home/self care. Follow-up Information     Follow up With Specialties Details Why Contact Info Additional Information     [facility] and Gynecology   [address]  [place] [id number] [address] [phone] 901 [facility] Merle [place], [address] [facility], [address] [phone]          - patient will call their PCP to let them know they were in the emergency department. We discuss return precautions and patient is agreeable with plan and aformentioned disposition.        - additional treatment intended, if consistent with primary provider:  - patient to follow with :      Discharge Medication List as of 2023 11:28 PM      CONTINUE these medications which have NOT CHANGED    Details   ondansetron (ZOFRAN) 4 mg tablet Take 1 tablet (4 mg total) by mouth every 8 (eight) hours as needed for nausea or vomiting, Starting Wed 2023, Normal      Prenatal MV-Min-FA-Omega-3 (Prenatal Gummies/DHA & FA) 0.4-32.5 MG CHEW 1 chewable daily. , Normal           No discharge procedures on file. Prior to Admission Medications   Prescriptions Last Dose Informant Patient Reported? Taking? Prenatal MV-Min-FA-Omega-3 (Prenatal Gummies/DHA & FA) 0.4-32.5 MG CHEW  Self No No   Si chewable daily. ondansetron (ZOFRAN) 4 mg tablet  Self No No   Sig: Take 1 tablet (4 mg total) by mouth every 8 (eight) hours as needed for nausea or vomiting   Patient not taking: Reported on 2023      Facility-Administered Medications: None       Portions of the record may have been created with voice recognition software. Occasional wrong word or "sound a like" substitutions may have occurred due to the inherent limitations of voice recognition software. Read the chart carefully and recognize, using context, where substitutions have occurred.     Electronically signed by:  Pearley Canavan, MD Babette Robson, MD  [phone]

## 2023-07-13 NOTE — H&P (VIEW-ONLY)
Assessment/Plan:  Sylvester Gee is a 24 y.o. [id number] with fetal demise measuring approx 15w1d who presents for consultation    1. Fetal demise due to miscarriage        2. Preoperative exam for gynecologic surgery           · Reviewed options for management include expectant (which I would recommend given associated risk of heavy bleeding, hemorrhage, infection), induction of labor (reviewed risk of retained placental tissue requiring D&E), and dilation and evacuation  · With prefer dilation and evacuation  · Reviewed procedure in detail including risk of pain, bleeding, infection, cervical laceration, uterine perforation, injury to surrounding structures requiring additional surgery including laparoscopy or open incision, which would require hospital stay  · reviewed and signed surgical consent  · Would like genetic testing if possible  · Would like footprints of possible  · Provided preop soap and booklet  All questions answered to the best of my ability. Reviewed with Vasquez Jarquin, and Juan M as well as with Raymond Nunez to expedite scheduling her procedure. Subjective:      Patient ID: Sylvester Gee is a 24 y.o. female.     HPI    This pregnancy has been notable for fetus with known cardiac defect  Had appointment with pediatric cardiology (7/11/23) at which point should have been 16 weeks 5 days was discovered to have a demise  She presented to the emergency room yesterday because over the subsequent days thought maybe she had been feeling movement, was distraught, seen in [facility] emergency room yesterday and again confirmed no fetal movement, no cardiac activity    Feels appropriately upset and frustrated  Would like to proceed with prevention soon as able    Minimal cramping  No bleeding  No fevers or chills    The following portions of the patient's history were reviewed and updated as appropriate: allergies, current medications, past family history, past medical history, past social history, past surgical history and problem list.    Review of Systems   Constitutional: Negative for fever. HENT: Negative for trouble swallowing. Respiratory: Negative for shortness of breath. Cardiovascular: Negative for chest pain. Gastrointestinal: Negative for abdominal pain. Genitourinary: Negative for vaginal bleeding. Some cramping   Musculoskeletal: Negative for gait problem. Objective:  /68 (BP Location: Left arm, Patient Position: Sitting, Cuff Size: Large)   Pulse 86   Wt 95.6 kg (210 lb 12.8 oz)   LMP 03/16/2023 (Approximate)   SpO2 97%   BMI 37.34 kg/m²    Physical Exam  Constitutional:       Appearance: Normal appearance. HENT:      Head: Normocephalic and atraumatic. Nose: Nose normal.   Eyes:      Extraocular Movements: Extraocular movements intact. Cardiovascular:      Rate and Rhythm: Normal rate and regular rhythm. Heart sounds: Normal heart sounds. Pulmonary:      Effort: Pulmonary effort is normal.      Breath sounds: Normal breath sounds. Abdominal:      General: Abdomen is flat. Palpations: Abdomen is soft. Genitourinary:     Comments: def  Musculoskeletal:         General: Normal range of motion. Cervical back: Normal range of motion. Right lower leg: No edema. Left lower leg: No edema. Skin:     General: Skin is warm. Neurological:      General: No focal deficit present. Mental Status: She is alert. Psychiatric:         Behavior: Behavior normal.         Thought Content:  Thought content normal.      Comments: Appropriately intermittently tearful

## 2023-07-13 NOTE — PROGRESS NOTES
Assessment/Plan:  Brigid Darden is a 24 y.o. [id number] with fetal demise measuring approx 15w1d who presents for consultation    1. Fetal demise due to miscarriage        2. Preoperative exam for gynecologic surgery           · Reviewed options for management include expectant (which I would recommend given associated risk of heavy bleeding, hemorrhage, infection), induction of labor (reviewed risk of retained placental tissue requiring D&E), and dilation and evacuation  · With prefer dilation and evacuation  · Reviewed procedure in detail including risk of pain, bleeding, infection, cervical laceration, uterine perforation, injury to surrounding structures requiring additional surgery including laparoscopy or open incision, which would require hospital stay  · reviewed and signed surgical consent  · Would like genetic testing if possible  · Would like footprints of possible  · Provided preop soap and booklet  All questions answered to the best of my ability. Reviewed with Vasquez Huizar, and Juan M as well as with Valeria Kaplan to expedite scheduling her procedure. Subjective:      Patient ID: Brigid Darden is a 24 y.o. female.     HPI    This pregnancy has been notable for fetus with known cardiac defect  Had appointment with pediatric cardiology (7/11/23) at which point should have been 16 weeks 5 days was discovered to have a demise  She presented to the emergency room yesterday because over the subsequent days thought maybe she had been feeling movement, was distraught, seen in [facility] emergency room yesterday and again confirmed no fetal movement, no cardiac activity    Feels appropriately upset and frustrated  Would like to proceed with prevention soon as able    Minimal cramping  No bleeding  No fevers or chills    The following portions of the patient's history were reviewed and updated as appropriate: allergies, current medications, past family history, past medical history, past social history, past surgical history and problem list.    Review of Systems   Constitutional: Negative for fever. HENT: Negative for trouble swallowing. Respiratory: Negative for shortness of breath. Cardiovascular: Negative for chest pain. Gastrointestinal: Negative for abdominal pain. Genitourinary: Negative for vaginal bleeding. Some cramping   Musculoskeletal: Negative for gait problem. Objective:  /68 (BP Location: Left arm, Patient Position: Sitting, Cuff Size: Large)   Pulse 86   Wt 95.6 kg (210 lb 12.8 oz)   LMP 03/16/2023 (Approximate)   SpO2 97%   BMI 37.34 kg/m²    Physical Exam  Constitutional:       Appearance: Normal appearance. HENT:      Head: Normocephalic and atraumatic. Nose: Nose normal.   Eyes:      Extraocular Movements: Extraocular movements intact. Cardiovascular:      Rate and Rhythm: Normal rate and regular rhythm. Heart sounds: Normal heart sounds. Pulmonary:      Effort: Pulmonary effort is normal.      Breath sounds: Normal breath sounds. Abdominal:      General: Abdomen is flat. Palpations: Abdomen is soft. Genitourinary:     Comments: def  Musculoskeletal:         General: Normal range of motion. Cervical back: Normal range of motion. Right lower leg: No edema. Left lower leg: No edema. Skin:     General: Skin is warm. Neurological:      General: No focal deficit present. Mental Status: She is alert. Psychiatric:         Behavior: Behavior normal.         Thought Content:  Thought content normal.      Comments: Appropriately intermittently tearful

## 2023-07-13 NOTE — ED NOTES
Attempted fetal heart tones, none obtained. Second nurse jeyson CARNES attempted to hear fetal heart tones. None found at this time. MD notified.  US at bedside     Farhana Bowden RN  07/12/23 [id number] Gallo Goss RN  07/12/23 4190

## 2023-07-14 ENCOUNTER — ANESTHESIA (OUTPATIENT)
Dept: PERIOP | Facility: HOSPITAL | Age: 23
End: 2023-07-14
Payer: COMMERCIAL

## 2023-07-14 ENCOUNTER — ANESTHESIA EVENT (OUTPATIENT)
Dept: PERIOP | Facility: HOSPITAL | Age: 23
End: 2023-07-14
Payer: COMMERCIAL

## 2023-07-14 ENCOUNTER — HOSPITAL ENCOUNTER (OUTPATIENT)
Facility: HOSPITAL | Age: 23
Setting detail: OUTPATIENT SURGERY
Discharge: HOME | End: 2023-07-14
Attending: OBSTETRICS & GYNECOLOGY | Admitting: OBSTETRICS & GYNECOLOGY
Payer: COMMERCIAL

## 2023-07-14 ENCOUNTER — TELEPHONE (OUTPATIENT)
Dept: OBGYN CLINIC | Facility: CLINIC | Age: 23
End: 2023-07-14

## 2023-07-14 VITALS
SYSTOLIC BLOOD PRESSURE: 118 MMHG | DIASTOLIC BLOOD PRESSURE: 62 MMHG | BODY MASS INDEX: 37.21 KG/M2 | WEIGHT: 210 LBS | HEART RATE: 78 BPM | RESPIRATION RATE: 13 BRPM | OXYGEN SATURATION: 98 % | TEMPERATURE: 98.6 F | HEIGHT: 63 IN

## 2023-07-14 DIAGNOSIS — Z98.890 S/P DILATION AND CURETTAGE: Primary | ICD-10-CM

## 2023-07-14 DIAGNOSIS — O02.1 MISSED ABORTION WITH FETAL DEMISE BEFORE 20 COMPLETED WEEKS OF GESTATION: ICD-10-CM

## 2023-07-14 LAB
ABO GROUP BLD: NORMAL
BLD GP AB SCN SERPL QL: NEGATIVE
RH BLD: POSITIVE
SPECIMEN EXPIRATION DATE: NORMAL

## 2023-07-14 PROCEDURE — 86900 BLOOD TYPING SEROLOGIC ABO: CPT | Performed by: NURSE ANESTHETIST, CERTIFIED REGISTERED

## 2023-07-14 PROCEDURE — 88182 CELL MARKER STUDY: CPT | Performed by: PATHOLOGY

## 2023-07-14 PROCEDURE — 88342 IMHCHEM/IMCYTCHM 1ST ANTB: CPT | Performed by: PATHOLOGY

## 2023-07-14 PROCEDURE — 88305 TISSUE EXAM BY PATHOLOGIST: CPT | Performed by: PATHOLOGY

## 2023-07-14 PROCEDURE — 59821 TREATMENT OF MISCARRIAGE: CPT | Performed by: OBSTETRICS & GYNECOLOGY

## 2023-07-14 PROCEDURE — 86850 RBC ANTIBODY SCREEN: CPT | Performed by: NURSE ANESTHETIST, CERTIFIED REGISTERED

## 2023-07-14 PROCEDURE — 86901 BLOOD TYPING SEROLOGIC RH(D): CPT | Performed by: NURSE ANESTHETIST, CERTIFIED REGISTERED

## 2023-07-14 RX ORDER — DOXYCYCLINE HYCLATE 100 MG/1
200 CAPSULE ORAL ONCE
Status: COMPLETED | OUTPATIENT
Start: 2023-07-14 | End: 2023-07-14

## 2023-07-14 RX ORDER — KETOROLAC TROMETHAMINE 30 MG/ML
INJECTION, SOLUTION INTRAMUSCULAR; INTRAVENOUS AS NEEDED
Status: DISCONTINUED | OUTPATIENT
Start: 2023-07-14 | End: 2023-07-14

## 2023-07-14 RX ORDER — DIPHENHYDRAMINE HYDROCHLORIDE 50 MG/ML
12.5 INJECTION INTRAMUSCULAR; INTRAVENOUS ONCE AS NEEDED
Status: COMPLETED | OUTPATIENT
Start: 2023-07-14 | End: 2023-07-14

## 2023-07-14 RX ORDER — FENTANYL CITRATE 50 UG/ML
INJECTION, SOLUTION INTRAMUSCULAR; INTRAVENOUS AS NEEDED
Status: DISCONTINUED | OUTPATIENT
Start: 2023-07-14 | End: 2023-07-14

## 2023-07-14 RX ORDER — ONDANSETRON 2 MG/ML
4 INJECTION INTRAMUSCULAR; INTRAVENOUS ONCE AS NEEDED
Status: DISCONTINUED | OUTPATIENT
Start: 2023-07-14 | End: 2023-07-14 | Stop reason: HOSPADM

## 2023-07-14 RX ORDER — PROMETHAZINE HYDROCHLORIDE 25 MG/ML
12.5 INJECTION, SOLUTION INTRAMUSCULAR; INTRAVENOUS
Status: DISCONTINUED | OUTPATIENT
Start: 2023-07-14 | End: 2023-07-14 | Stop reason: HOSPADM

## 2023-07-14 RX ORDER — ONDANSETRON 2 MG/ML
4 INJECTION INTRAMUSCULAR; INTRAVENOUS ONCE
Status: COMPLETED | OUTPATIENT
Start: 2023-07-14 | End: 2023-07-14

## 2023-07-14 RX ORDER — SENNOSIDES 8.6 MG
650 CAPSULE ORAL EVERY 8 HOURS PRN
Qty: 30 TABLET | Refills: 0 | Status: SHIPPED | OUTPATIENT
Start: 2023-07-14

## 2023-07-14 RX ORDER — PROPOFOL 10 MG/ML
INJECTION, EMULSION INTRAVENOUS AS NEEDED
Status: DISCONTINUED | OUTPATIENT
Start: 2023-07-14 | End: 2023-07-14

## 2023-07-14 RX ORDER — FENTANYL CITRATE/PF 50 MCG/ML
25 SYRINGE (ML) INJECTION
Status: DISCONTINUED | OUTPATIENT
Start: 2023-07-14 | End: 2023-07-14 | Stop reason: HOSPADM

## 2023-07-14 RX ORDER — SODIUM CHLORIDE, SODIUM LACTATE, POTASSIUM CHLORIDE, CALCIUM CHLORIDE 600; 310; 30; 20 MG/100ML; MG/100ML; MG/100ML; MG/100ML
125 INJECTION, SOLUTION INTRAVENOUS CONTINUOUS
Status: DISCONTINUED | OUTPATIENT
Start: 2023-07-14 | End: 2023-07-14 | Stop reason: HOSPADM

## 2023-07-14 RX ORDER — OXYCODONE HYDROCHLORIDE 5 MG/1
10 TABLET ORAL EVERY 4 HOURS PRN
Status: DISCONTINUED | OUTPATIENT
Start: 2023-07-14 | End: 2023-07-14 | Stop reason: HOSPADM

## 2023-07-14 RX ORDER — DIPHENHYDRAMINE HYDROCHLORIDE 50 MG/ML
INJECTION INTRAMUSCULAR; INTRAVENOUS
Status: DISPENSED
Start: 2023-07-14 | End: 2023-07-15

## 2023-07-14 RX ORDER — IBUPROFEN 600 MG/1
600 TABLET ORAL EVERY 6 HOURS PRN
Qty: 30 TABLET | Refills: 0 | Status: SHIPPED | OUTPATIENT
Start: 2023-07-14

## 2023-07-14 RX ORDER — OXYCODONE HYDROCHLORIDE 5 MG/1
5 TABLET ORAL EVERY 4 HOURS PRN
Status: DISCONTINUED | OUTPATIENT
Start: 2023-07-14 | End: 2023-07-14 | Stop reason: HOSPADM

## 2023-07-14 RX ORDER — ONDANSETRON 2 MG/ML
4 INJECTION INTRAMUSCULAR; INTRAVENOUS EVERY 6 HOURS PRN
Status: DISCONTINUED | OUTPATIENT
Start: 2023-07-14 | End: 2023-07-14 | Stop reason: HOSPADM

## 2023-07-14 RX ORDER — METHYLERGONOVINE MALEATE 0.2 MG/ML
INJECTION INTRAVENOUS AS NEEDED
Status: DISCONTINUED | OUTPATIENT
Start: 2023-07-14 | End: 2023-07-14

## 2023-07-14 RX ORDER — IBUPROFEN 600 MG/1
600 TABLET ORAL EVERY 6 HOURS PRN
Status: DISCONTINUED | OUTPATIENT
Start: 2023-07-14 | End: 2023-07-14 | Stop reason: HOSPADM

## 2023-07-14 RX ORDER — ACETAMINOPHEN 325 MG/1
975 TABLET ORAL EVERY 6 HOURS PRN
Status: DISCONTINUED | OUTPATIENT
Start: 2023-07-14 | End: 2023-07-14 | Stop reason: HOSPADM

## 2023-07-14 RX ORDER — LIDOCAINE HYDROCHLORIDE 10 MG/ML
0.5 INJECTION, SOLUTION EPIDURAL; INFILTRATION; INTRACAUDAL; PERINEURAL ONCE AS NEEDED
Status: DISCONTINUED | OUTPATIENT
Start: 2023-07-14 | End: 2023-07-14 | Stop reason: HOSPADM

## 2023-07-14 RX ORDER — HYDROMORPHONE HCL IN WATER/PF 6 MG/30 ML
0.2 PATIENT CONTROLLED ANALGESIA SYRINGE INTRAVENOUS
Status: DISCONTINUED | OUTPATIENT
Start: 2023-07-14 | End: 2023-07-14 | Stop reason: HOSPADM

## 2023-07-14 RX ORDER — LIDOCAINE HYDROCHLORIDE 10 MG/ML
INJECTION, SOLUTION EPIDURAL; INFILTRATION; INTRACAUDAL; PERINEURAL AS NEEDED
Status: DISCONTINUED | OUTPATIENT
Start: 2023-07-14 | End: 2023-07-14

## 2023-07-14 RX ORDER — ONDANSETRON 2 MG/ML
INJECTION INTRAMUSCULAR; INTRAVENOUS AS NEEDED
Status: DISCONTINUED | OUTPATIENT
Start: 2023-07-14 | End: 2023-07-14

## 2023-07-14 RX ORDER — DEXAMETHASONE SODIUM PHOSPHATE 10 MG/ML
INJECTION, SOLUTION INTRAMUSCULAR; INTRAVENOUS AS NEEDED
Status: DISCONTINUED | OUTPATIENT
Start: 2023-07-14 | End: 2023-07-14

## 2023-07-14 RX ORDER — SUCCINYLCHOLINE/SOD CL,ISO/PF 100 MG/5ML
SYRINGE (ML) INTRAVENOUS AS NEEDED
Status: DISCONTINUED | OUTPATIENT
Start: 2023-07-14 | End: 2023-07-14

## 2023-07-14 RX ORDER — MIDAZOLAM HYDROCHLORIDE 2 MG/2ML
INJECTION, SOLUTION INTRAMUSCULAR; INTRAVENOUS AS NEEDED
Status: DISCONTINUED | OUTPATIENT
Start: 2023-07-14 | End: 2023-07-14

## 2023-07-14 RX ADMIN — FENTANYL CITRATE 25 MCG: 50 INJECTION INTRAMUSCULAR; INTRAVENOUS at 17:30

## 2023-07-14 RX ADMIN — Medication 100 MG: at 16:56

## 2023-07-14 RX ADMIN — ONDANSETRON 4 MG: 2 INJECTION INTRAMUSCULAR; INTRAVENOUS at 16:45

## 2023-07-14 RX ADMIN — ONDANSETRON 4 MG: 2 INJECTION INTRAMUSCULAR; INTRAVENOUS at 17:21

## 2023-07-14 RX ADMIN — FENTANYL CITRATE 50 MCG: 50 INJECTION INTRAMUSCULAR; INTRAVENOUS at 17:05

## 2023-07-14 RX ADMIN — FENTANYL CITRATE 50 MCG: 50 INJECTION INTRAMUSCULAR; INTRAVENOUS at 16:56

## 2023-07-14 RX ADMIN — FENTANYL CITRATE 50 MCG: 50 INJECTION INTRAMUSCULAR; INTRAVENOUS at 17:16

## 2023-07-14 RX ADMIN — SODIUM CHLORIDE, SODIUM LACTATE, POTASSIUM CHLORIDE, AND CALCIUM CHLORIDE: .6; .31; .03; .02 INJECTION, SOLUTION INTRAVENOUS at 16:52

## 2023-07-14 RX ADMIN — ACETAMINOPHEN 975 MG: 325 TABLET ORAL at 18:27

## 2023-07-14 RX ADMIN — FENTANYL CITRATE 25 MCG: 50 INJECTION INTRAMUSCULAR; INTRAVENOUS at 17:26

## 2023-07-14 RX ADMIN — MIDAZOLAM HYDROCHLORIDE 2 MG: 1 INJECTION, SOLUTION INTRAMUSCULAR; INTRAVENOUS at 16:52

## 2023-07-14 RX ADMIN — PROPOFOL 200 MG: 10 INJECTION, EMULSION INTRAVENOUS at 16:56

## 2023-07-14 RX ADMIN — KETOROLAC TROMETHAMINE 30 MG: 30 INJECTION, SOLUTION INTRAMUSCULAR at 17:37

## 2023-07-14 RX ADMIN — IBUPROFEN 600 MG: 600 TABLET, FILM COATED ORAL at 18:28

## 2023-07-14 RX ADMIN — DEXAMETHASONE SODIUM PHOSPHATE 10 MG: 10 INJECTION, SOLUTION INTRAMUSCULAR; INTRAVENOUS at 16:56

## 2023-07-14 RX ADMIN — OXYCODONE HYDROCHLORIDE 10 MG: 5 TABLET ORAL at 18:27

## 2023-07-14 RX ADMIN — METHYLERGONOVINE MALEATE 0.2 MG: 0.2 INJECTION INTRAVENOUS at 17:33

## 2023-07-14 RX ADMIN — DIPHENHYDRAMINE HYDROCHLORIDE 12.5 MG: 50 INJECTION INTRAMUSCULAR; INTRAVENOUS at 18:44

## 2023-07-14 RX ADMIN — ONDANSETRON 4 MG: 2 INJECTION INTRAMUSCULAR; INTRAVENOUS at 18:01

## 2023-07-14 RX ADMIN — LIDOCAINE HYDROCHLORIDE 50 MG: 10 INJECTION, SOLUTION EPIDURAL; INFILTRATION; INTRACAUDAL; PERINEURAL at 16:56

## 2023-07-14 RX ADMIN — DOXYCYCLINE 200 MG: 100 CAPSULE ORAL at 16:44

## 2023-07-14 NOTE — ANESTHESIA PREPROCEDURE EVALUATION
Procedure:  DILATATION AND EVACUATION (D&E) 17 WEEKS (Uterus)    Relevant Problems   ANESTHESIA (within normal limits)   (-) History of anesthesia complications      CARDIO   (-) Chest pain   (-) MONROY (dyspnea on exertion)      PULMONARY   (+) Mild intermittent asthma without complication   (-) Shortness of breath   (-) URI (upper respiratory infection)      Other   (+) Obesity affecting pregnancy, antepartum        Physical Exam    Airway    Mallampati score: II  TM Distance: >3 FB  Neck ROM: full     Dental       Cardiovascular      Pulmonary      Other Findings        Anesthesia Plan  ASA Score- 2     Anesthesia Type- general with ASA Monitors. Additional Monitors:   Airway Plan: ETT. Plan Factors-Exercise tolerance (METS): >4 METS. Chart reviewed. EKG reviewed. Existing labs reviewed. Patient summary reviewed. Induction- intravenous. Postoperative Plan-     Informed Consent- Anesthetic plan and risks discussed with patient. I personally reviewed this patient with the CRNA. Discussed and agreed on the Anesthesia Plan with the CRNA. Saleem Burris

## 2023-07-14 NOTE — ANESTHESIA POSTPROCEDURE EVALUATION
Post-Op Assessment Note    CV Status:  Stable  Pain Score: 0    Pain management: adequate     Mental Status:  Arousable and sleepy   Hydration Status:  Euvolemic and stable   PONV Controlled:  Controlled   Airway Patency:  Patent and adequate      Post Op Vitals Reviewed: Yes      Staff: CRNA         No notable events documented.     BP  113/63    Temp      Pulse 79   Resp 20   SpO2 100% simple mask 4L

## 2023-07-14 NOTE — TELEPHONE ENCOUNTER
Patient is scheduled for D&E at 17 weeks today. Fetus will be sent out for Microarray testing. Placed call to [facility] and spoke to Maki Angel on how we can see if the testing can be approved. Lolly the genetic counselor was not in the office, will return on Thursday. Maki Angel advised to send the sample with specifics and I can touch base with Lolly on Thursday regarding patients coverage. I did ask if prior authorizations can be back dated in these circumstances with insurances and was told yes they can. Will follow up with Lolly on 7/20/23 at [phone].

## 2023-07-14 NOTE — OP NOTE
OPERATIVE REPORT  PATIENT NAME: Gia Mcclelland    :  2000  MRN: [id number]  Pt Location: AN OR ROOM 01    SURGERY DATE: 2023    Surgeon(s) and Role:     * Caro Driver MD - Primary     * Charles Yepez MD - Assisting     * Rasheed Olsen DO - Assisting    Preop Diagnosis:  Missed  with fetal demise before 21 completed weeks of gestation [O02.1]    Post-Op Diagnosis Codes:     * Missed  with fetal demise before 21 completed weeks of gestation [O02.1]    Procedure(s):  DILATATION AND EVACUATION (D&E) 16 WEEKS    Specimen(s):  Products of conception - private disposition with genetic testing    Estimated Blood Loss:   200cc    UOP: 100cc    Anesthesia Type:   General    Operative Indications:  Missed  with fetal demise before 21 completed weeks of gestation [O02.1]    Operative Findings:  1. External genitalia grossly normal in appearance. No ulcerations, no lacerations, no lesions. Bimanual exam revealed antverted, ~15 weeks uterus with normal contours and freely mobile. Cervix was 0.5 cm dilated on bimanual exam.   2.  Vagina and cervix were  grossly normal in appearance without any lacerations or lesions. Complications:   None apparent    Procedure and Technique:  The patient was taken to the operating room where she was properly identified. She was placed under general endotracheal anesthesia without difficulty. She was prepped and draped in the normal sterile fashion in the dorsal lithotomy position using the stirrups. Care was taken to avoid excessive flexion or extension of the patients hips and knees or pressure on her extremities. A time out was performed and everyone was in agreement. The patient received 200 mg PO doxycyline preoperatively. A bimanual exam was performed and the uterus was approximately 15 weeks in size. The cervix was found to be 0.5 cm dilated.     A Rohini speculum and [place] retractor were placed in the patient's vagina and the anterior lip of the cervix was grasped with a single toothed tenaculum. The cervix was serially dilated to 43 Fr with Preston dilators and 19 mm Hegar dilator. A 16mm curved tip was selected. This was advanced to the fundus and suction was activated. Amniotomy was achieved and amniotic fluid along with products of conception were collected in the suction trap. Under ultrasound guidance, sopher ovum forceps were then advanced into the uterine cavity. The fetal parts were disarticulated and removed from the uterine cavity. All fetal parts were counted and identified after uterine evacuation. The suction curette was reintroduced and used to suction the remaining products of conception which were collected in the suction trap. The uterus was felt to clamp down. Small amount of bleeding noted through external os. The bladder was drained with a straight cath for 100cc of clear urine. 0.2 mg of IM Methergine was administered intraoperatively. The uterus was massaged and firm with minimal bleeding. All instruments were removed from the patient's vagina. The patient tolerated the procedure well. Sponge, instrument and needle counts were correct times x2. The patient was cleansed, awakened from anesthesia and taken to the recovery room in stable condition.      Patient Disposition:  PACU         SIGNATURE: Lashawn Atwood DO  DATE: July 14, 2023  TIME: 5:49 PM

## 2023-07-14 NOTE — TELEPHONE ENCOUNTER
Patient called, is in need of a Lyft to her procedure this afternoon. Called [phone], transportation is able to be provided and  will be at 1415 at the confirmed home address. Patient made aware.

## 2023-07-14 NOTE — INTERVAL H&P NOTE
H&P reviewed. After examining the patient I find no changes in the patients condition since the H&P had been written. Vitals:    07/14/23 1510   BP: 118/57   Pulse: 83   Resp: 18   Temp: 97.9 °F (36.6 °C)   SpO2: 97%     Serenity Ramos MD  OB/GYN  7/14/2023  4:20 PM

## 2023-07-18 ENCOUNTER — TELEPHONE (OUTPATIENT)
Dept: OBGYN CLINIC | Facility: CLINIC | Age: 23
End: 2023-07-18

## 2023-07-18 NOTE — TELEPHONE ENCOUNTER
[facility] called, left message on answering machine, specimen collected from DOS 7/14/23 - they want to confirm that only a micro ray is needed? In addition they need to confirm the gestational age and possible gender of fetus. Raisa Ash can be reached at [address] [place] [address] [phone]. Will route to provider for additional information.

## 2023-07-19 NOTE — TELEPHONE ENCOUNTER
MicroArray was all that was recommended by [facility]. Gestational age was 15w1d based on fetal growth. Fetal sex was predicted female by NIPT. Thanks!

## 2023-07-20 ENCOUNTER — OFFICE VISIT (OUTPATIENT)
Dept: OBGYN CLINIC | Facility: CLINIC | Age: 23
End: 2023-07-20
Payer: COMMERCIAL

## 2023-07-20 VITALS
HEIGHT: 63 IN | DIASTOLIC BLOOD PRESSURE: 70 MMHG | BODY MASS INDEX: 37.92 KG/M2 | SYSTOLIC BLOOD PRESSURE: 100 MMHG | WEIGHT: 214 LBS

## 2023-07-20 DIAGNOSIS — Z09 FOLLOW-UP EXAM: ICD-10-CM

## 2023-07-20 DIAGNOSIS — O02.1 FETAL DEMISE BEFORE 20 WEEKS WITH RETENTION OF DEAD FETUS: Primary | ICD-10-CM

## 2023-07-20 PROCEDURE — 99213 OFFICE O/P EST LOW 20 MIN: CPT | Performed by: NURSE PRACTITIONER

## 2023-07-20 NOTE — TELEPHONE ENCOUNTER
Placed call to Lolly, left a message advising of the situation and requested a return call to see how we could proceed with getting a prior authorization approved for patient.

## 2023-07-20 NOTE — PROGRESS NOTES
Ruddy Amos is a 24 y.o. female  who presents for follow-up after a fetal demise at 12 5/7 weeks. She underwent a dialation and evacuation at 17 weeks on 23. She states she is overall doing well. Denies symptoms of depression. Bleeding moderate lochia. Bowel function is normal. Bladder function is normal. Patient has not resumed sexual activity. Contraception method - she is interested in the Mirena IUD. The following portions of the patient's history were reviewed and updated as appropriate: allergies, current medications, past family history, past medical history, past social history, past surgical history and problem list.    Review of Systems  Pertinent items are noted in HPI. .    Objective     Ht 5' 3" (1.6 m)   Wt 97.1 kg (214 lb)   LMP 2023 (Approximate)   Breastfeeding Unknown   BMI 37.91 kg/m²      Physical Exam  Constitutional:       Appearance: Normal appearance. Genitourinary:      Right Labia: No rash, tenderness, lesions, skin changes or Bartholin's cyst.     Left Labia: No tenderness, lesions, skin changes, Bartholin's cyst or rash. No labial fusion noted. Vaginal bleeding (moderate ) present. No vaginal discharge, erythema, tenderness or granulation tissue. No vaginal prolapse present. No vaginal atrophy present. Right Adnexa: not tender, not full and no mass present. Left Adnexa: not tender, not full and no mass present. Cervix is parous. No cervical discharge, friability, lesion or nabothian cyst.      Uterus is not enlarged, fixed or tender. Uterus is anteverted. Pelvic exam was performed with patient in the lithotomy position. HENT:      Head: Normocephalic and atraumatic. Musculoskeletal:      Cervical back: Neck supple. Neurological:      Mental Status: She is alert. Skin:     General: Skin is warm. Psychiatric:         Behavior: Behavior is cooperative.    Vitals and nursing note reviewed. Assessment/Plan     1. Contraception: interested in initiating Mirena IUD. 2. Follow up in: 4 - 6  weeks for IUD insertion.

## 2023-07-21 ENCOUNTER — TELEPHONE (OUTPATIENT)
Dept: OBGYN CLINIC | Facility: CLINIC | Age: 23
End: 2023-07-21

## 2023-07-21 NOTE — TELEPHONE ENCOUNTER
Niece from [address] [place] [address] lmom - calling to verify a genetic test order for pt. Received products of conception specimen on 7/14/23. Need to confirm if anything additional is needed in addition to microarray that is ordered. [phone]      Noted previous documentation 7/18/23 and 7/19/23. Previously reviewed with provider and recommendations noted. Returned call to [address] [place] [address], spoke to Niece and confirmed only MicroArray as ordered per previous review with provider.

## 2023-07-21 NOTE — TELEPHONE ENCOUNTER
Spoke to Lolly - in her experience a PA is not needed for the Microarray. When I spoke to [address] [place] [address] no mention of a PA was needed.

## 2023-07-24 PROCEDURE — 88342 IMHCHEM/IMCYTCHM 1ST ANTB: CPT | Performed by: PATHOLOGY

## 2023-07-24 PROCEDURE — 88305 TISSUE EXAM BY PATHOLOGIST: CPT | Performed by: PATHOLOGY

## 2023-08-18 LAB — SCAN RESULT: NORMAL

## 2024-01-23 ENCOUNTER — APPOINTMENT (OUTPATIENT)
Dept: LAB | Facility: CLINIC | Age: 24
End: 2024-01-23

## 2024-01-23 ENCOUNTER — OFFICE VISIT (OUTPATIENT)
Dept: OBGYN CLINIC | Facility: CLINIC | Age: 24
End: 2024-01-23

## 2024-01-23 VITALS
BODY MASS INDEX: 40.74 KG/M2 | DIASTOLIC BLOOD PRESSURE: 76 MMHG | SYSTOLIC BLOOD PRESSURE: 124 MMHG | RESPIRATION RATE: 16 BRPM | WEIGHT: 230 LBS | HEART RATE: 88 BPM

## 2024-01-23 DIAGNOSIS — Z3A.11 11 WEEKS GESTATION OF PREGNANCY: Primary | ICD-10-CM

## 2024-01-23 DIAGNOSIS — Z3A.11 11 WEEKS GESTATION OF PREGNANCY: ICD-10-CM

## 2024-01-23 DIAGNOSIS — Z36.89 ENCOUNTER FOR OTHER SPECIFIED ANTENATAL SCREENING: ICD-10-CM

## 2024-01-23 DIAGNOSIS — N91.2 AMENORRHEA: ICD-10-CM

## 2024-01-23 LAB
ABO GROUP BLD: NORMAL
BACTERIA UR QL AUTO: ABNORMAL /HPF
BASOPHILS # BLD AUTO: 0.03 THOUSANDS/ÂΜL (ref 0–0.1)
BASOPHILS NFR BLD AUTO: 0 % (ref 0–1)
BILIRUB UR QL STRIP: NEGATIVE
BLD GP AB SCN SERPL QL: NEGATIVE
CLARITY UR: ABNORMAL
COLOR UR: ABNORMAL
EOSINOPHIL # BLD AUTO: 0.13 THOUSAND/ÂΜL (ref 0–0.61)
EOSINOPHIL NFR BLD AUTO: 2 % (ref 0–6)
ERYTHROCYTE [DISTWIDTH] IN BLOOD BY AUTOMATED COUNT: 12.6 % (ref 11.6–15.1)
GLUCOSE UR STRIP-MCNC: NEGATIVE MG/DL
HBV SURFACE AB SER-ACNC: <3 MIU/ML
HBV SURFACE AG SER QL: NORMAL
HCT VFR BLD AUTO: 37.8 % (ref 34.8–46.1)
HCV AB SER QL: NORMAL
HGB BLD-MCNC: 12.7 G/DL (ref 11.5–15.4)
HGB UR QL STRIP.AUTO: NEGATIVE
HIV 1+2 AB+HIV1 P24 AG SERPL QL IA: NORMAL
HIV 2 AB SERPL QL IA: NORMAL
HIV1 AB SERPL QL IA: NORMAL
HIV1 P24 AG SERPL QL IA: NORMAL
IMM GRANULOCYTES # BLD AUTO: 0.02 THOUSAND/UL (ref 0–0.2)
IMM GRANULOCYTES NFR BLD AUTO: 0 % (ref 0–2)
KETONES UR STRIP-MCNC: NEGATIVE MG/DL
LEUKOCYTE ESTERASE UR QL STRIP: NEGATIVE
LYMPHOCYTES # BLD AUTO: 2.01 THOUSANDS/ÂΜL (ref 0.6–4.47)
LYMPHOCYTES NFR BLD AUTO: 27 % (ref 14–44)
MCH RBC QN AUTO: 29.6 PG (ref 26.8–34.3)
MCHC RBC AUTO-ENTMCNC: 33.6 G/DL (ref 31.4–37.4)
MCV RBC AUTO: 88 FL (ref 82–98)
MONOCYTES # BLD AUTO: 0.33 THOUSAND/ÂΜL (ref 0.17–1.22)
MONOCYTES NFR BLD AUTO: 4 % (ref 4–12)
MUCOUS THREADS UR QL AUTO: ABNORMAL
NEUTROPHILS # BLD AUTO: 5.02 THOUSANDS/ÂΜL (ref 1.85–7.62)
NEUTS SEG NFR BLD AUTO: 67 % (ref 43–75)
NITRITE UR QL STRIP: NEGATIVE
NON-SQ EPI CELLS URNS QL MICRO: ABNORMAL /HPF
NRBC BLD AUTO-RTO: 0 /100 WBCS
PH UR STRIP.AUTO: 5.5 [PH]
PLATELET # BLD AUTO: 231 THOUSANDS/UL (ref 149–390)
PMV BLD AUTO: 10.8 FL (ref 8.9–12.7)
PROT UR STRIP-MCNC: ABNORMAL MG/DL
RBC # BLD AUTO: 4.29 MILLION/UL (ref 3.81–5.12)
RBC #/AREA URNS AUTO: ABNORMAL /HPF
RH BLD: POSITIVE
RUBV IGG SERPL IA-ACNC: 244.1 IU/ML
SP GR UR STRIP.AUTO: 1.03 (ref 1–1.03)
SPECIMEN EXPIRATION DATE: NORMAL
TRANS CELLS #/AREA URNS HPF: PRESENT /[HPF]
TREPONEMA PALLIDUM IGG+IGM AB [PRESENCE] IN SERUM OR PLASMA BY IMMUNOASSAY: NORMAL
UROBILINOGEN UR STRIP-ACNC: <2 MG/DL
WBC # BLD AUTO: 7.54 THOUSAND/UL (ref 4.31–10.16)
WBC #/AREA URNS AUTO: ABNORMAL /HPF

## 2024-01-23 PROCEDURE — 87389 HIV-1 AG W/HIV-1&-2 AB AG IA: CPT

## 2024-01-23 PROCEDURE — 86706 HEP B SURFACE ANTIBODY: CPT

## 2024-01-23 PROCEDURE — 86901 BLOOD TYPING SEROLOGIC RH(D): CPT

## 2024-01-23 PROCEDURE — 87186 SC STD MICRODIL/AGAR DIL: CPT

## 2024-01-23 PROCEDURE — 86780 TREPONEMA PALLIDUM: CPT

## 2024-01-23 PROCEDURE — 87340 HEPATITIS B SURFACE AG IA: CPT

## 2024-01-23 PROCEDURE — 86803 HEPATITIS C AB TEST: CPT

## 2024-01-23 PROCEDURE — 86850 RBC ANTIBODY SCREEN: CPT

## 2024-01-23 PROCEDURE — 81001 URINALYSIS AUTO W/SCOPE: CPT

## 2024-01-23 PROCEDURE — 86900 BLOOD TYPING SEROLOGIC ABO: CPT

## 2024-01-23 PROCEDURE — 87086 URINE CULTURE/COLONY COUNT: CPT

## 2024-01-23 PROCEDURE — 85025 COMPLETE CBC W/AUTO DIFF WBC: CPT

## 2024-01-23 PROCEDURE — 36415 COLL VENOUS BLD VENIPUNCTURE: CPT

## 2024-01-23 PROCEDURE — 86762 RUBELLA ANTIBODY: CPT

## 2024-01-23 PROCEDURE — 87077 CULTURE AEROBIC IDENTIFY: CPT

## 2024-01-23 NOTE — PROGRESS NOTES
FIRST TRIMESTER OBSTETRIC ULTRASOUND  1/23/2024  Karla Negrete MD     INDICATION: Amenorrhea, viability    COMPARISON: None.     TECHNIQUE:   Transvaginal imaging was performed to assess the gestation, myometrial/endometrial architecture and ovarian parenchymal detail.    The study includes volumetric sweeps and traditional still imaging technique.      FINDINGS:     A single intrauterine gestation is identified.  Cardiac activity is detected as well as fetal movement.      YOLK SAC:  Present and normal in size and appearance.  MEAN CROWN RUMP LENGTH:  4.80 cm = 11 weeks 4 days   AMNIOTIC FLUID/SAC SHAPE:  Within expected normal range.     UTERUS/ADNEXA:   No adnexal mass or pathologic cyst.  No free fluid identified.       IMPRESSION:    According to , will date based off LMP (11/6/2023)  Final JAIME: 8/12/2024  Gestational age: 11w1d  Fetal cardiac activity detected.  No adnexal masses seen.     Dr. Delgado was present for the entire visit.

## 2024-01-24 ENCOUNTER — PATIENT OUTREACH (OUTPATIENT)
Dept: OBGYN CLINIC | Facility: CLINIC | Age: 24
End: 2024-01-24

## 2024-01-24 DIAGNOSIS — Z59.82 TRANSPORTATION INSECURITY: Primary | ICD-10-CM

## 2024-01-24 SDOH — ECONOMIC STABILITY - TRANSPORTATION SECURITY: TRANSPORTATION INSECURITY: Z59.82

## 2024-01-24 NOTE — PROGRESS NOTES
VIDA SALMERON attempted to contact pt to address needs for transportation. Pt did not answer. VIDA SALMERON left a voicemail for a return call.     Pt returned call to VIDA SALMERON.    Pt  has concerns for transportation. Pt is currently seeking prenatal services at [place] before her intake with [facility]. Pt lives in [place] and has no supports to help with transportation to appointment. Pt denies future concerns for pn appointments due to close proximity of home and [facility] office.     Pt has a child who attends school and he will be in school during visit. Pt is the only individual needed for transportation.     VIDA SALMERON returned call to pt to confirm appointment Lyft transportation service.  Pt confirmed address, phone number and location for appointment. Vida SALMERON will f/u with pt in person at her pn intake.

## 2024-01-25 LAB — BACTERIA UR CULT: ABNORMAL

## 2024-01-29 ENCOUNTER — ROUTINE PRENATAL (OUTPATIENT)
Facility: HOSPITAL | Age: 24
End: 2024-01-29

## 2024-01-29 ENCOUNTER — PATIENT OUTREACH (OUTPATIENT)
Dept: OBGYN CLINIC | Facility: CLINIC | Age: 24
End: 2024-01-29

## 2024-01-29 VITALS
HEART RATE: 80 BPM | DIASTOLIC BLOOD PRESSURE: 68 MMHG | BODY MASS INDEX: 40.82 KG/M2 | WEIGHT: 230.4 LBS | HEIGHT: 63 IN | SYSTOLIC BLOOD PRESSURE: 116 MMHG

## 2024-01-29 DIAGNOSIS — Z3A.12 12 WEEKS GESTATION OF PREGNANCY: ICD-10-CM

## 2024-01-29 DIAGNOSIS — O99.211 OBESITY AFFECTING PREGNANCY IN FIRST TRIMESTER, UNSPECIFIED OBESITY TYPE: ICD-10-CM

## 2024-01-29 DIAGNOSIS — Z36.82 ENCOUNTER FOR (NT) NUCHAL TRANSLUCENCY SCAN: Primary | ICD-10-CM

## 2024-01-29 DIAGNOSIS — O34.219 HISTORY OF CESAREAN DELIVERY, ANTEPARTUM: ICD-10-CM

## 2024-01-29 PROBLEM — O02.1 FETAL DEMISE BEFORE 20 WEEKS WITH RETENTION OF DEAD FETUS: Status: RESOLVED | Noted: 2023-07-11 | Resolved: 2024-01-29

## 2024-01-29 PROBLEM — O09.299 HISTORY OF FETAL ANOMALY IN PRIOR PREGNANCY, CURRENTLY PREGNANT: Status: ACTIVE | Noted: 2023-06-20

## 2024-01-29 PROCEDURE — 76813 OB US NUCHAL MEAS 1 GEST: CPT | Performed by: OBSTETRICS & GYNECOLOGY

## 2024-01-29 PROCEDURE — 76801 OB US < 14 WKS SINGLE FETUS: CPT | Performed by: OBSTETRICS & GYNECOLOGY

## 2024-01-29 PROCEDURE — 99243 OFF/OP CNSLTJ NEW/EST LOW 30: CPT | Performed by: OBSTETRICS & GYNECOLOGY

## 2024-01-29 PROCEDURE — 76817 TRANSVAGINAL US OBSTETRIC: CPT | Performed by: OBSTETRICS & GYNECOLOGY

## 2024-01-29 NOTE — LETTER
"2024     Karla Negrete MD  [address] [place] [id number]    Patient: Nanette Camarillo   YOB: 2000   Date of Visit: 2024       Dear Dr. Negrete:    Thank you for referring Nanette Camarillo to me for evaluation. Below are my notes for this consultation.    If you have questions, please do not hesitate to call me. I look forward to following your patient along with you.         Sincerely,        Kamila Beltre MD        CC: No Recipients    Kamila Beltre MD  2024 12:46 PM  Sign when Signing Visit  [facility]: Ms. Camarillo was seen today at 12w0d for nuchal translucency ultrasound.  See ultrasound report under \"OB Procedures\" tab.      My recommendations are as follows:  We reviewed the availability of aneuploidy screening, as well as diagnostic testing, which are available to all pregnant women. We reviewed limitations, risks, and benefits of screening and testing. She elected to proceed with Non Invasive Prenatal Screening (NIPS). She does not currently have active insurance, and wanted to wait for insurance coverage before having NIPS drawn. She was instructed to call our office to schedule a nurse appointment to have NIPS drawn once she is ready to proceed. MSAFP screening should be ordered through your office at 15-20 weeks gestation, and completed prior to fetal anatomic survey. She does not wish to pursue diagnostic testing at this time. 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 A detailed anatomic survey as well as transvaginal cervical length screening are recommended between 21-22 weeks gestation. Fetal echocardiography was recommended at 23-24 weeks gestation.    Body mass index > 30 kg/m2 is associated with an increased risk of several pregnancy complications, including hypertensive disorders, diabetes, abnormal fetal growth, " fetal malformations. The risk of  delivery is also increased, as are wound complications in the event of  delivery. A healthy diet and exercise, as well as appropriate gestational weight gain (no more than 11-20 pounds) can help reduce risk of these complications. 150 minutes of moderate exercise per week is recommended for all pregnant women. Nutrition counseling is also available if desired.  Early screening for gestational diabetes is recommended, as well as routine re-screening at 24-28 weeks if early screening results are normal. I ordered her a fasting glucose and hemoglobin A1C for early GDM screening, and emphasized this should be done prior to 14 weeks, otherwise she would need a glucola.  fetal surveillance is also recommended as follows: Pre-Pregnancy BMI >40: Evaluation of fetal growth at 34 weeks gestation, as well as antepartum fetal surveillance once weekly beginning at 34 weeks gestation.     Please don't hesitate to contact our office with any concerns or questions.    -Kamila Beltre MD

## 2024-01-29 NOTE — PROGRESS NOTES
Ultrasound Probe Disinfection    A transvaginal ultrasound was performed.   Prior to use, disinfection was performed with High Level Disinfection Process ([id number]).  Probe serial number [id number]: [id number] was used.      Mago Salcido  01/29/24  10:38 AM

## 2024-01-29 NOTE — PROGRESS NOTES
PJ JARON received a call regarding transportation need.     PJ JARON returned call to pt. PJ SALMERON left a voicemail for a return call.     Pt reports that her lyft ride did not get here this morning that PJ SALMERON set up. Pt was able to receive a last minute ride from her friend and was able to make it to her appointment. Pt reports that freddy has established communication to set up further rides for future appointments.

## 2024-01-29 NOTE — PROGRESS NOTES
"[facility]: Ms. Camarillo was seen today at 12w0d for nuchal translucency ultrasound.  See ultrasound report under \"OB Procedures\" tab.      My recommendations are as follows:  We reviewed the availability of aneuploidy screening, as well as diagnostic testing, which are available to all pregnant women. We reviewed limitations, risks, and benefits of screening and testing. She elected to proceed with Non Invasive Prenatal Screening (NIPS). She does not currently have active insurance, and wanted to wait for insurance coverage before having NIPS drawn. She was instructed to call our office to schedule a nurse appointment to have NIPS drawn once she is ready to proceed. MSAFP screening should be ordered through your office at 15-20 weeks gestation, and completed prior to fetal anatomic survey. She does not wish to pursue diagnostic testing at this time. 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 A detailed anatomic survey as well as transvaginal cervical length screening are recommended between 21-22 weeks gestation. Fetal echocardiography was recommended at 23-24 weeks gestation.    Body mass index > 30 kg/m2 is associated with an increased risk of several pregnancy complications, including hypertensive disorders, diabetes, abnormal fetal growth, fetal malformations. The risk of  delivery is also increased, as are wound complications in the event of  delivery. A healthy diet and exercise, as well as appropriate gestational weight gain (no more than 11-20 pounds) can help reduce risk of these complications. 150 minutes of moderate exercise per week is recommended for all pregnant women. Nutrition counseling is also available if desired.  Early screening for gestational diabetes is recommended, as well as routine re-screening at 24-28 weeks if early screening results are normal. I ordered her a " fasting glucose and hemoglobin A1C for early GDM screening, and emphasized this should be done prior to 14 weeks, otherwise she would need a glucola.  fetal surveillance is also recommended as follows: Pre-Pregnancy BMI >40: Evaluation of fetal growth at 34 weeks gestation, as well as antepartum fetal surveillance once weekly beginning at 34 weeks gestation.     Please don't hesitate to contact our office with any concerns or questions.    -Kamila Beltre MD

## 2024-01-30 PROBLEM — E66.01 CLASS 3 SEVERE OBESITY WITH BODY MASS INDEX (BMI) OF 40.0 TO 44.9 IN ADULT (HCC): Status: ACTIVE | Noted: 2019-05-21

## 2024-01-30 PROBLEM — E66.813 CLASS 3 SEVERE OBESITY WITH BODY MASS INDEX (BMI) OF 40.0 TO 44.9 IN ADULT (HCC): Status: ACTIVE | Noted: 2019-05-21

## 2024-01-31 ENCOUNTER — PATIENT OUTREACH (OUTPATIENT)
Dept: OBGYN CLINIC | Facility: CLINIC | Age: 24
End: 2024-01-31

## 2024-01-31 NOTE — PROGRESS NOTES
PJ SALMERON received a call from pt. Pt reports that she needs to reschedule her intake appointment. PJ SALMERON informed pt that she will need to speak with .     Pt reports that she recently lost her grandfather. PJ SALMERON informed pt that we will meet for a pn assessment when she reschedules. PJ SALMERON encouraged pt to contact PJ SALMERON for additional support if needed.

## 2024-02-06 ENCOUNTER — TELEPHONE (OUTPATIENT)
Facility: HOSPITAL | Age: 24
End: 2024-02-06

## 2024-02-06 ENCOUNTER — INITIAL PRENATAL (OUTPATIENT)
Dept: OBGYN CLINIC | Facility: CLINIC | Age: 24
End: 2024-02-06

## 2024-02-06 DIAGNOSIS — Z34.92 PRENATAL CARE IN SECOND TRIMESTER: Primary | ICD-10-CM

## 2024-02-06 NOTE — TELEPHONE ENCOUNTER
Left voicemail requesting she give our office a call back at [phone] as we need to reschedule her 4/15/24 Fetal Echo due to our [place] office no longer being open on a Monday.

## 2024-02-06 NOTE — LETTER
Dentist Letter          02/06/24      Nanette Camarillo  2000  [address] [address]              We have had several requests from local dentist requesting permission to perform procedures on our patients who are pregnant. We wish to respond with this letter regarding some of the more routine procedures that we have been asked about.    The following procedures may be performed on our obstetric patients:   1. Administration of local anesthesia   2. Administration of antibiotics such as PCN, Ampicillin, and Erythromycin.   3. Administration of pain medications such as Tylenol, Tylenol with codeine, and if needed Percocet.   4. Shielded X-rays    Should you have any questions, please do not hesitate to contact at [phone].        Sincerely,    [facility]   [address] [place]  [address] [phone]

## 2024-02-06 NOTE — LETTER
[facility] Letter    Nanette M Marquise  2000  [address] [address]       02/06/24          Nanette Camarillo is a patient and under our care in our office. Nanette Camarillo's Estimated Date of Delivery: 8/12/24.  Any questions or concerns feel free to contact our office.     Thank you,    [facility]      [facility]/[place]  [phone]  [facility]/[place]  [phone]    [facility]/[place]  [phone]   [facility]  [phone]    [facility]/[place]   [phone]    [facility]  [phone]

## 2024-02-06 NOTE — PROGRESS NOTES
OB INTAKE INTERVIEW   Pt presents for OB intake.     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     Hx of  delivery prior to 36 weeks 6 days:  No   If yes, place a referral for cervical surveillance at 16 weeks.     Last Menstrual Period:    Patient's last menstrual period was 2023.     Ultrasound date:   11 weeks 4 days     Estimated Date of Delivery: 24   Confirmed by LMP or US    H&P visit scheduled. 2024      Last pap smear: n/a    Findings; lab pap smear results:      Current Issues:  Constipation :   No  Headaches :   No  Cramping:  No  Spotting :   No  PICA cravings :  No    FOB Involved:   Yes  Planned pregnancy:  No    I have these concerns about this prenatal patient:   Unplanned pregnancy.  Fob is involved.  Patient has [place] medicaid.  Instructed to discuss with financial counselors regarding care at our office.  Reports history of cocaine use but not in several years.   Patient resides in apartment with 1 child (the other child lives with FOB).  Currently unemployed but gets child support to supplement rent.  Currently walks or uses lyft to get to appOntuitive.    Interview education    Baby and Me Handout  [facility] Handout  [facility] Handout  Discussed genetic testing  Prenatal lab work: Scripts printed and given to pt.   Influenza vaccine given today: No  Discussed Tdap vaccine.   Immunizations:   Immunization History   Administered Date(s) Administered     DTaP 5 1999, 1999, 06/14/2000, 10/10/2001, 11/20/2003    HPV Quadrivalent 10/17/2008, 11/14/2009, 08/26/2010    Hep A, adult 11/10/2009, 08/26/2010    Hep B, adult 1999, 1999, 1999    Hib (PRP-OMP) 1999, 1999, 06/14/2000, 10/10/2001    IPV 1999, 06/14/2000, 10/10/2001, 11/20/2003    Influenza Quadrivalent Preservative Free 3 years and older IM 10/20/2017    MMR 10/10/2001, 11/20/2003    Meningococcal, Unknown Serogroups 08/26/2010    Tdap 08/26/2010, 05/31/2019    Varicella 06/14/2000, 11/10/2009     Depression Screening Follow-up Plan: Patient's depression screening was negative with an Chatsworth score of  3  Patient assessed for underlying major depression. They have no active suicidal ideations. Brief counseling provided and recommend additional follow-up/re-evaluation next office visit..          Infection Screening: Does the pt have a hx of MRSA? No  If yes- please follow MRSA protocol and obtain a nasal swab for MRSA culture    The patient was oriented to our practice and all questions were answered.  Interviewed by: Linda Sanz RN 02/06/24

## 2024-02-06 NOTE — LETTER
Work Letter    Nanette Camarillo  2000  [address]  [place] [address]    Dear Nanette Camarillo,      02/06/24        Your employee is a patient at [facility].    We recommend that all pregnant women:    1. Have a well-ventilated workspace.  2. Wear low-heeled shoes.  3. Work no more than 40 hours per week.  4. Have a 15 minute break every 2 hours and at least 30 minutes for a meal break.   5. Use good body mechanics by bending at your knees to avoid back strain and lift no more than 20 pounds without assistance. Will need assistance with lifting over 20 lbs.   6. Have ready access to bathrooms and water.      She may continue to work until her due date unless medical complications arise. We anticipate she may return to work in 6-8 weeks after delivery.     Sincerely,    [facility]  [address] [place] [address] [phone]

## 2024-02-06 NOTE — LETTER
Proof of Pregnancy Letter    Nanette Camarillo  2000  [address] [address]        02/06/24      Nanette LARRY Marquise is a patient at our facility. Nanette Camarillo Estimated Date of Delivery: 8/12/24       Any questions or concerns, please feel free to contact our office.    Sincerely,     [facility]   [address] [place] [address] [phone]

## 2024-02-14 ENCOUNTER — TELEPHONE (OUTPATIENT)
Dept: OBGYN CLINIC | Facility: CLINIC | Age: 24
End: 2024-02-14

## 2024-02-20 ENCOUNTER — LAB (OUTPATIENT)
Dept: LAB | Facility: CLINIC | Age: 24
End: 2024-02-20

## 2024-02-20 ENCOUNTER — ROUTINE PRENATAL (OUTPATIENT)
Dept: OBGYN CLINIC | Facility: CLINIC | Age: 24
End: 2024-02-20

## 2024-02-20 VITALS
HEART RATE: 82 BPM | SYSTOLIC BLOOD PRESSURE: 118 MMHG | BODY MASS INDEX: 41.81 KG/M2 | RESPIRATION RATE: 16 BRPM | WEIGHT: 236 LBS | DIASTOLIC BLOOD PRESSURE: 75 MMHG

## 2024-02-20 DIAGNOSIS — Z3A.12 12 WEEKS GESTATION OF PREGNANCY: ICD-10-CM

## 2024-02-20 DIAGNOSIS — Z3A.15 15 WEEKS GESTATION OF PREGNANCY: ICD-10-CM

## 2024-02-20 DIAGNOSIS — Z3A.15 15 WEEKS GESTATION OF PREGNANCY: Primary | ICD-10-CM

## 2024-02-20 DIAGNOSIS — O99.211 OBESITY AFFECTING PREGNANCY IN FIRST TRIMESTER, UNSPECIFIED OBESITY TYPE: ICD-10-CM

## 2024-02-20 LAB
EST. AVERAGE GLUCOSE BLD GHB EST-MCNC: 111 MG/DL
GLUCOSE P FAST SERPL-MCNC: 83 MG/DL (ref 65–99)
HBA1C MFR BLD: 5.5 %

## 2024-02-20 PROCEDURE — 82105 ALPHA-FETOPROTEIN SERUM: CPT

## 2024-02-20 PROCEDURE — 87491 CHLMYD TRACH DNA AMP PROBE: CPT

## 2024-02-20 PROCEDURE — 87591 N.GONORRHOEAE DNA AMP PROB: CPT

## 2024-02-20 PROCEDURE — 82947 ASSAY GLUCOSE BLOOD QUANT: CPT

## 2024-02-20 PROCEDURE — 36415 COLL VENOUS BLD VENIPUNCTURE: CPT

## 2024-02-20 PROCEDURE — 83036 HEMOGLOBIN GLYCOSYLATED A1C: CPT

## 2024-02-20 NOTE — PROGRESS NOTES
"OB/GYN  PRENATAL H&P VISIT  Nanette Camarillo  2024  11:32 AM  Dr. Cyrus Mcnulty MD      SUBJECTIVE  Nanette Camarillo is a 23 y.o.  at 15w1d here for initial prenatal H&P. She reports that she had initially wanted to terminate the pregnancy and [facility] 11 weeks gestation and was told she was \"too late for an .\" Discussed with patient that her gestational age is not a barrier to termination. She can she still choose to terminate if she prefers. She reports she is no longer considering a termination and has decided to proceed with pregnancy.     She is currently doing well. She denies vaginal bleeding, cramping, leakage, abnormal discharge.     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 She was told she is too high risk to work during pregnancy. Discussed with patient that she can work--she will start looking for jobs as she has been wanting to work.   - She has hx of GC/CT in 2017, was treated and has been negative on multiple occasions since.  - Denies a hx of TB or close contacts with persons with TB. She has never had MRSA.   - She used to smoke cigarettes and marijuana, but stopped on 24. She has not used EtoH since finding out she was pregnancy.    Family History:  - She or her partner donot have a family history of inheritable conditions such as physical or intellectual disabilities, birth defects, blood disorders, heart or neural tube defects.       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         Past Medical History:   Diagnosis Date    Allergic     Morphine    Anxiety     previously on meds Buspar    Arthritis     Possible Arthritis in knee    Asthma     Depression     Obesity     Psychiatric disorder     depression, anxiety       Past Surgical History:   Procedure Laterality Date     SECTION      MT TX MISSED  FIRST TRIMESTER SURGICAL N/A 2023    Procedure: DILATATION AND EVACUATION (D&E) 17 WEEKS;  Surgeon: Serenity Calvo MD;  Location: [facility];  Service: Gynecology       Social History     Socioeconomic History    Marital status: Single     Spouse name: Not on file    Number of children: Not on file    Years of education: Not on file    Highest education level: Not on file   Occupational History    Not on file   Tobacco Use    Smoking status: Former     Current packs/day: 0.00     Types: Cigarettes     Quit date: 2023     Years since quittin.8     Passive exposure: Past    Smokeless tobacco: Never   Vaping Use    Vaping status: Never Used   Substance and Sexual Activity    Alcohol use: No    Drug use: Not Currently     Types: Marijuana    Sexual activity: Yes     Partners: Female, Male     Birth control/protection: None   Other Topics Concern    Not on file   Social History Narrative    Live in apartment by herself.  Her father has custody of previous child . Previous substance abuse issues.     Social Determinants of Health     Financial Resource Strain: High Risk (2024)    Overall Financial Resource Strain (CARDIA)     Difficulty of Paying Living Expenses: Very hard   Food Insecurity: Food  Insecurity Present (2024)    Hunger Vital Sign     Worried About Running Out of Food in the Last Year: Sometimes true     Ran Out of Food in the Last Year: Sometimes true   Transportation Needs: Unmet Transportation Needs (2024)    PRAPARE - Transportation     Lack of Transportation (Medical): Yes     Lack of Transportation (Non-Medical): No   Physical Activity: Not on file   Stress: Not on file   Social Connections: Not on file   Intimate Partner Violence: Not At Risk (2024)    Humiliation, Afraid, Rape, and Kick questionnaire     Fear of Current or Ex-Partner: No     Emotionally Abused: No     Physically Abused: No     Sexually Abused: No   Housing Stability: Low Risk  (2024)    Housing Stability Vital Sign     Unable to Pay for Housing in the Last Year: No     Number of Places Lived in the Last Year: 1     Unstable Housing in the Last Year: No       OBJECTIVE  Vitals:    24 1058   BP: 118/75   Pulse: 82   Resp: 16       General: Well appearing, no distress  Respiratory: Unlabored breathing  Cardiovascular: Regular rate.  Abdomen: Soft, gravid, nontender  Fundal Height: Appropriate for gestational age.  Extremities: Warm and well perfused.  Non tender.    ASSESSMENT AND PLAN    23 y.o., , with /75 (BP Location: Right arm, Patient Position: Sitting, Cuff Size: Standard)   Pulse 82   Resp 16   Wt 107 kg (236 lb)   LMP 2023 , at 15w1d here for her prenatal H&P.    Pregnancy: H&P completed today. PN Labs reviewed today.  Labor expectations discussed with patient, including appointment schedule, nutrition, exercise, medications, sexual intercourse, and nausea/vomiting. Patient's BMI is 41.8. Recommended weight gain is 11-20 lbs.  by TANG        Overview:  Labs  Pap smear 10/10/2022  NILM and GC/CT collected 24  Prenatal panel wnl  Early 1h gtt, Hba1c pending  NIPS to be done after she has insurance  MSA ordered 24  28w labs to be done at 28  weeks  Vaccines:  Flu vaccine: did not receive  Covid vaccine: had 2 doses in , recommend booster  Tdap vaccine: to be given at 28 weeks  Contraception: discussed various options and provided information, considering Mirena IUD, continued discussing  Feeding plan: breastfeeding  Birth plan: , unsure about epidural  Delivery consent: to be signed at 28w  Ultrasounds: Early anatomy scheduled for 24, with repeat anatomy between 21-22 weeks, and fetal echo at 23-24 weeks  hx heterotaxy    Screening: Pap smear done 10/10/22, wnl. GC/CT collected. Genetic screening reviewed with patient - will proceed with NIPS after she has insurance. MSAFP discussed to be done between 16-18w and ordered.       Problem List          Respiratory    Mild intermittent asthma without complication    Overview     23: no recent inhaler use            Other    Class 3 severe obesity with body mass index (BMI) of 40.0 to 44.9 in adult ([facility])    Overview     Early A1c and fasting glucose ordered to be completed prior to 14 weeks  Needs APFS at 34 weeks  Recommend fetal cardiac echo         History of substance abuse ([facility])    History of anxiety    History of depression    15 weeks gestation of pregnancy    History of fetal anomaly in prior pregnancy, currently pregnant    Overview     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

## 2024-02-21 NOTE — RESULT ENCOUNTER NOTE
Negative early screening for GDM. Advised via mychart. Needs a glucola at 24-28 weeks for routine screening.

## 2024-02-22 LAB
2ND TRIMESTER 4 SCREEN SERPL-IMP: NORMAL
AFP ADJ MOM SERPL: 0.71
AFP INTERP AMN-IMP: NORMAL
AFP INTERP SERPL-IMP: NORMAL
AFP INTERP SERPL-IMP: NORMAL
AFP SERPL-MCNC: 16.2 NG/ML
AGE AT DELIVERY: 25.3 YR
C TRACH DNA SPEC QL NAA+PROBE: NEGATIVE
GA METHOD: NORMAL
GA: 15.1 WEEKS
IDDM PATIENT QL: NO
MULTIPLE PREGNANCY: NO
N GONORRHOEA DNA SPEC QL NAA+PROBE: NEGATIVE
NEURAL TUBE DEFECT RISK FETUS: NORMAL %

## 2024-02-26 ENCOUNTER — TELEPHONE (OUTPATIENT)
Facility: HOSPITAL | Age: 24
End: 2024-02-26

## 2024-02-26 NOTE — TELEPHONE ENCOUNTER
Called [employer] spoke with Carter @ # [phone] to set up [employer] transportation for patient's Maternal Fetal Medicine appointment.  Appointment scheduled on  February 27, 2024 at 10:45 AM at our [facility].       Spoke with patient and explained [facility] will pick-up patient at 10:00 AM for Maternal Fetal Medicine appointment.   Patient instructed she has 5 minutes maximum to get into car upon arrival. Patient verbalized understanding.     Confirmed phone and address.   [phone]  [address] [place] [id number]    Left voicemail for patient confirming [facility]YFT will pick-up at 10:00 AM for her Maternal Fetal Medicine appointment.   Patient instructed she has 5 minutes maximum to get into car upon arrival.  Patient instructed to call # [phone] any questions.

## 2024-02-27 ENCOUNTER — ROUTINE PRENATAL (OUTPATIENT)
Facility: HOSPITAL | Age: 24
End: 2024-02-27
Payer: COMMERCIAL

## 2024-02-27 VITALS
BODY MASS INDEX: 42.13 KG/M2 | DIASTOLIC BLOOD PRESSURE: 64 MMHG | SYSTOLIC BLOOD PRESSURE: 118 MMHG | HEART RATE: 98 BPM | HEIGHT: 63 IN | WEIGHT: 237.8 LBS

## 2024-02-27 DIAGNOSIS — Z3A.16 16 WEEKS GESTATION OF PREGNANCY: ICD-10-CM

## 2024-02-27 DIAGNOSIS — O34.219 HISTORY OF CESAREAN DELIVERY, ANTEPARTUM: ICD-10-CM

## 2024-02-27 DIAGNOSIS — E66.01 CLASS 3 SEVERE OBESITY DUE TO EXCESS CALORIES WITHOUT SERIOUS COMORBIDITY WITH BODY MASS INDEX (BMI) OF 40.0 TO 44.9 IN ADULT (HCC): ICD-10-CM

## 2024-02-27 DIAGNOSIS — O09.299 HISTORY OF FETAL ANOMALY IN PRIOR PREGNANCY, CURRENTLY PREGNANT: Primary | ICD-10-CM

## 2024-02-27 PROBLEM — R87.612 LOW GRADE SQUAMOUS INTRAEPITHELIAL LESION ON CYTOLOGIC SMEAR OF CERVIX (LGSIL): Status: RESOLVED | Noted: 2018-07-18 | Resolved: 2024-02-27

## 2024-02-27 PROBLEM — F19.11 HISTORY OF SUBSTANCE ABUSE (HCC): Status: RESOLVED | Noted: 2019-07-22 | Resolved: 2024-02-27

## 2024-02-27 PROBLEM — R87.612 LOW GRADE SQUAMOUS INTRAEPITHELIAL LESION ON CYTOLOGIC SMEAR OF CERVIX (LGSIL): Status: ACTIVE | Noted: 2018-07-18

## 2024-02-27 PROBLEM — Z86.59 HISTORY OF DEPRESSION: Status: RESOLVED | Noted: 2019-07-22 | Resolved: 2024-02-27

## 2024-02-27 PROBLEM — Z86.59 HISTORY OF ANXIETY: Status: RESOLVED | Noted: 2019-07-22 | Resolved: 2024-02-27

## 2024-02-27 PROCEDURE — 99213 OFFICE O/P EST LOW 20 MIN: CPT | Performed by: OBSTETRICS & GYNECOLOGY

## 2024-02-27 PROCEDURE — 76805 OB US >/= 14 WKS SNGL FETUS: CPT | Performed by: OBSTETRICS & GYNECOLOGY

## 2024-02-27 NOTE — LETTER
2024     Karla Negrete MD  [address] [place] [id number]    Patient: Nanette Camarillo   YOB: 2000   Date of Visit: 2024       Dear Dr. Negrete:    Thank you for referring Nanette Camarillo to me for evaluation. Below are my notes for this consultation.    If you have questions, please do not hesitate to call me. I look forward to following your patient along with you.         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      The anatomical survey was limited secondary to early gestational age.    Declined genetic screening due to cost. MSAFP wnl.     Recommend a follow-up ultrasound at 20 weeks for anatomy and at 23 weeks for a fetal echo.    Johanna Hannon MD    Pre visit time reviewing her records 5 minutes  Face to face time 5 minutes  Post visit time on documentation of note, updating her problem list, adding orders and prescriptions 5 minutes.  Procedures that were completed today were charged separately.   The level of decision making was straight forward     [Negative] : Genitourinary

## 2024-02-27 NOTE — PROGRESS NOTES
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      The anatomical survey was limited secondary to early gestational age.    Decl[facility] screening due to cost[facility]     Recommend a follow-up ultrasound at 20 weeks for anatomy and at 23 weeks for a fetal echo.    Johanna Hannon MD    Pre visit time reviewing her records 5 minutes  Face to face time 5 minutes  Post visit time on documentation of note, updating her problem list, adding orders and prescriptions 5 minutes.  Procedures that were completed today were charged separately.   The level of decision making was straight forward

## 2024-03-04 ENCOUNTER — TELEPHONE (OUTPATIENT)
Age: 24
End: 2024-03-04

## 2024-03-04 ENCOUNTER — TELEPHONE (OUTPATIENT)
Dept: LABOR AND DELIVERY | Facility: HOSPITAL | Age: 24
End: 2024-03-04

## 2024-03-04 NOTE — TELEPHONE ENCOUNTER
Reason for call:   Patient states she has an upcoming appt 3/28/24 and is asking if the prenatal vitamins can be prescribed for her.  PATIENT IS ASKING FOR GUMMIES, STATES SHE CANNOT TAKE THE PILLS       [x] Refill   [] Prior Auth  [] Other:     Office:   [] PCP/Provider -   [x] Specialty/Provider - Shy polanco    Medication: prenatal vit      Pharmacy:   [facility] in [facility]

## 2024-03-05 DIAGNOSIS — Z34.92 PRENATAL CARE IN SECOND TRIMESTER: Primary | ICD-10-CM

## 2024-03-05 RX ORDER — VITAMIN A ACETATE, .BETA.-CAROTENE, ASCORBIC ACID, CHOLECALCIFEROL, .ALPHA.-TOCOPHEROL ACETATE, DL-, THIAMINE MONONITRATE, RIBOFLAVIN, NIACINAMIDE, PYRIDOXINE HYDROCHLORIDE, FOLIC ACID, CYANOCOBALAMIN, CALCIUM CARBONATE, FERROUS FUMARATE, ZINC OXIDE, CUPRIC OXIDE 9.9; 120; 920; 200; 400; 2; 12; 27; 1; 20; 10; 3; 1.84; 3080; 25 MG/1; MG/1; [IU]/1; MG/1; [IU]/1; MG/1; UG/1; MG/1; MG/1; MG/1; MG/1; MG/1; MG/1; [IU]/1; MG/1
1 TABLET, FILM COATED ORAL DAILY
Qty: 90 TABLET | Refills: 3 | Status: SHIPPED | OUTPATIENT
Start: 2024-03-05

## 2024-03-28 ENCOUNTER — TELEPHONE (OUTPATIENT)
Facility: HOSPITAL | Age: 24
End: 2024-03-28

## 2024-03-28 ENCOUNTER — ROUTINE PRENATAL (OUTPATIENT)
Dept: OBGYN CLINIC | Facility: CLINIC | Age: 24
End: 2024-03-28

## 2024-03-28 VITALS — WEIGHT: 244.2 LBS | SYSTOLIC BLOOD PRESSURE: 108 MMHG | BODY MASS INDEX: 43.26 KG/M2 | DIASTOLIC BLOOD PRESSURE: 62 MMHG

## 2024-03-28 DIAGNOSIS — Z3A.20 20 WEEKS GESTATION OF PREGNANCY: Primary | ICD-10-CM

## 2024-03-28 DIAGNOSIS — Z34.92 PRENATAL CARE, SECOND TRIMESTER: ICD-10-CM

## 2024-03-28 PROCEDURE — PNV: Performed by: STUDENT IN AN ORGANIZED HEALTH CARE EDUCATION/TRAINING PROGRAM

## 2024-03-28 NOTE — PROGRESS NOTES
Nanette is a 23 y.o.  20w3d. Reports ++FM, no LOF, VB      Vitals:    24 1622   BP: 108/62   S=D  +FHTs    A/P:  First tri labs wnl  Rh status POS    1xCS-->1xVBAC  Wants another TOLAC  Desires Mirena IUD, explained her insurance doesn't cover postplacental placement, but can have place at 6 weeks  Contemplating bilateral salpingectomy    Discussed pre-term labor precautions  Return to office in 4 weeks

## 2024-03-28 NOTE — TELEPHONE ENCOUNTER
Called [employer] spoke with ED @ # [phone] to set up [employer] transportation for patient's Maternal Fetal Medicine appointment.  Appointment scheduled on  April 1, 2024 at 10:15 AM at our [facility].       Spoke with patient and explained L[facility]FT will pick-up patient at 9:20 AM for Maternal Fetal Medicine appointment.   Patient instructed she has 5 minutes maximum to get into car upon arrival. Patient verbalized understanding.     Confirmed phone and address.   [phone]  [address] [place] [phone]

## 2024-03-29 ENCOUNTER — NURSE TRIAGE (OUTPATIENT)
Age: 24
End: 2024-03-29

## 2024-03-29 ENCOUNTER — TELEPHONE (OUTPATIENT)
Age: 24
End: 2024-03-29

## 2024-03-29 PROBLEM — O99.212 OBESITY AFFECTING PREGNANCY IN SECOND TRIMESTER: Status: ACTIVE | Noted: 2024-03-29

## 2024-03-29 NOTE — TELEPHONE ENCOUNTER
"Patient called states she had questions regarding pregnancy safe medications can take for cold symptoms (runny nose and cough) Reviewed safe medications with patient. Patient denies fever at this time. Advised patient if symptoms worsen to reach out to primary care physician for further work up. Patient verbalized understanding. No further questions at this time.     Reason for Disposition   Information only question and nurse able to answer    Answer Assessment - Initial Assessment Questions  1. REASON FOR CALL or QUESTION: \"What is your reason for calling today?\" or \"How can I best help you?\" or \"What question do you have that I can help answer?\"      Patient called states she would like to know what meds can take in pregnancy for cold symptoms    Protocols used: Information Only Call - No Triage-ADULT-[place]    "

## 2024-03-29 NOTE — TELEPHONE ENCOUNTER
Pt wanted to know if she can take Bendaryl allergies.  Confirmed  with BIPIN [facility].  Nothing with decongestant.

## 2024-03-31 ENCOUNTER — HOSPITAL ENCOUNTER (EMERGENCY)
Facility: HOSPITAL | Age: 24
Discharge: HOME | End: 2024-03-31
Attending: EMERGENCY MEDICINE
Payer: COMMERCIAL

## 2024-03-31 VITALS
WEIGHT: 243 LBS | RESPIRATION RATE: 18 BRPM | TEMPERATURE: 98.3 F | HEIGHT: 63 IN | BODY MASS INDEX: 43.05 KG/M2 | HEART RATE: 110 BPM | DIASTOLIC BLOOD PRESSURE: 78 MMHG | OXYGEN SATURATION: 99 % | SYSTOLIC BLOOD PRESSURE: 130 MMHG

## 2024-03-31 DIAGNOSIS — Z3A.20 20 WEEKS GESTATION OF PREGNANCY: ICD-10-CM

## 2024-03-31 DIAGNOSIS — J02.9 SORE THROAT: ICD-10-CM

## 2024-03-31 DIAGNOSIS — R05.9 COUGH: Primary | ICD-10-CM

## 2024-03-31 LAB
FLUAV RNA RESP QL NAA+PROBE: NEGATIVE
FLUBV RNA RESP QL NAA+PROBE: POSITIVE
RSV RNA RESP QL NAA+PROBE: NEGATIVE
S PYO DNA THROAT QL NAA+PROBE: NOT DETECTED
SARS-COV-2 RNA RESP QL NAA+PROBE: NEGATIVE

## 2024-03-31 PROCEDURE — 0241U HB NFCT DS VIR RESP RNA 4 TRGT: CPT | Performed by: PHYSICIAN ASSISTANT

## 2024-03-31 PROCEDURE — 99284 EMERGENCY DEPT VISIT MOD MDM: CPT | Performed by: PHYSICIAN ASSISTANT

## 2024-03-31 PROCEDURE — 99283 EMERGENCY DEPT VISIT LOW MDM: CPT

## 2024-03-31 PROCEDURE — 87651 STREP A DNA AMP PROBE: CPT | Performed by: PHYSICIAN ASSISTANT

## 2024-03-31 NOTE — ED PROVIDER NOTES
History  Chief Complaint   Patient presents with    Sore Throat     Pt. States  that she has had a sore throat since Friday. No fevers but is 20 weeks pregnant       Sore Throat  Location:  Generalized  Quality:  Sore  Severity:  Mild  Onset quality:  Gradual  Duration:  3 days  Timing:  Constant  Progression:  Unchanged  Chronicity:  New  Relieved by:  Nothing  Associated symptoms: no abdominal pain, no chills, no cough, no fever, no rash, no rhinorrhea, no shortness of breath, no sinus congestion and no trouble swallowing    Risk factors: sick contacts    Risk factors: no exposure to strep and no exposure to mono        Prior to Admission Medications   Prescriptions Last Dose Informant Patient Reported? Taking?   Prenatal Vit-Fe Fumarate-FA (Prenatal Plus Vitamin/Mineral) 27-1 MG TABS 3/31/2024  No Yes   Sig: Take 1 tablet by mouth in the morning      Facility-Administered Medications: None       Past Medical History:   Diagnosis Date    Allergic     Morphine    Anxiety     previously on meds Buspar    Arthritis     Possible Arthritis in knee    Asthma     Depression     History of anxiety     Low grade squamous intraepithelial lesion on cytologic smear of cervix (LGSIL)     Formatting of this note might be different from the original.   Patient to have Co testing in 1 year    Obesity     Psychiatric disorder     depression, anxiety       Past Surgical History:   Procedure Laterality Date     SECTION      IA TX MISSED  FIRST TRIMESTER SURGICAL N/A 2023    Procedure: DILATATION AND EVACUATION (D&E) 17 WEEKS;  Surgeon: Serenity Calvo MD;  [facility]: [facility];  Service: Gynecology       Family History   Problem Relation Age of Onset    Substance Abuse Mother     Alcohol abuse Mother     Mental illness Father     Depression Father     Diabetes Maternal Grandmother     Diabetes Maternal Grandfather     Diabetes Paternal Grandmother      I have reviewed and agree with the history as  documented.    E-Cigarette/Vaping    E-Cigarette Use Never User      E-Cigarette/Vaping Substances    Nicotine No     THC No     CBD No     Flavoring No     Other No     Unknown No      Social History     Tobacco Use    Smoking status: Former     Current packs/day: 0.00     Types: Cigarettes     Quit date: 2023     Years since quittin.0     Passive exposure: Past    Smokeless tobacco: Never   Vaping Use    Vaping status: Never Used   Substance Use Topics    Alcohol use: No    Drug use: Not Currently     Types: Marijuana     Comment: stopped  using  in january       Review of Systems   Constitutional:  Negative for chills and fever.   HENT:  Positive for sore throat. Negative for rhinorrhea and trouble swallowing.    Respiratory:  Negative for cough and shortness of breath.    Gastrointestinal:  Negative for abdominal pain.   Skin:  Negative for rash.   All other systems reviewed and are negative.      Physical Exam  Physical Exam  Vitals reviewed.   Constitutional:       Appearance: She is well-developed. She is obese.   HENT:      Head: Normocephalic.      Right Ear: Tympanic membrane and ear canal normal.      Left Ear: Tympanic membrane and ear canal normal.      Nose: No congestion or rhinorrhea.      Mouth/Throat:      Pharynx: Posterior oropharyngeal erythema present. No oropharyngeal exudate.   Cardiovascular:      Rate and Rhythm: Normal rate and regular rhythm.   Pulmonary:      Effort: Pulmonary effort is normal.   Abdominal:      General: There is no distension.      Tenderness: There is no abdominal tenderness. There is no guarding.   Musculoskeletal:         General: Normal range of motion.      Cervical back: Normal range of motion.   Skin:     General: Skin is warm and dry.      Capillary Refill: Capillary refill takes less than 2 seconds.   Neurological:      General: No focal deficit present.      Mental Status: She is alert.   Psychiatric:         Mood and Affect: Mood normal.         Vital  Signs  ED Triage Vitals [03/31/24 1024]   Temperature Pulse Respirations Blood Pressure SpO2   98.3 °F (36.8 °C) (!) 110 18 130/78 99 %      Temp src Heart Rate Source Patient Position - Orthostatic VS BP Location FiO2 (%)   -- Monitor -- -- --      Pain Score       --           Vitals:    03/31/24 1024   BP: 130/78   Pulse: (!) 110         Visual Acuity      ED Medications  Medications - No data to display    Diagnostic Studies  Results Reviewed       Procedure Component Value Units Date/Time    FLU/RSV/COVID - if FLU/RSV clinically relevant [[id number]]  (Abnormal) Collected: 03/31/24 1052    Lab Status: Final result Specimen: Nares from Nose Updated: 03/31/24 1142     SARS-CoV-2 Negative     INFLUENZA A PCR Negative     INFLUENZA B PCR Positive     RSV PCR Negative    Narrative:      FOR PEDIATRIC PATIENTS - copy/paste COVID Guidelines URL to browser: [email or web address]    SARS-CoV-2 assay is a Nucleic Acid Amplification assay intended for the  qualitative detection of nucleic acid from SARS-CoV-2 in nasopharyngeal  swabs. Results are for the presumptive identification of SARS-CoV-2 RNA.    Positive results are indicative of infection with SARS-CoV-2, the virus  causing COVID-19, but do not rule out bacterial infection or co-infection  with other viruses. Laboratories within the [place] and its  territories are required to report all positive results to the appropriate  public health authorities. Negative results do not preclude SARS-CoV-2  infection and should not be used as the sole basis for treatment or other  patient management decisions. Negative results must be combined with  clinical observations, patient history, and epidemiological information.  This test has not been FDA cleared or approved.    This test has been authorized by FDA under an Emergency Use Authorization  (EUA). This test is only authorized for the duration of time  the  declaration that circumstances exist justifying the authorization of the  emergency use of an in vitro diagnostic tests for detection of SARS-CoV-2  virus and/or diagnosis of COVID-19 infection under section 564(b)(1) of  the Act, 21 U.S.C. 360bbb-3(b)(1), unless the authorization is terminated  or revoked sooner. The test has been validated but independent review by FDA  and CLIA is pending.    Test performed using [employer] GeneXpert: This RT-PCR assay targets N2,  a region unique to SARS-CoV-2. A conserved region in the E-gene was chosen  for pan-Sarbecovirus detection which includes SARS-CoV-2.    According to CMS-2020-01-R, this platform meets the definition of high-throughput technology.    Strep A PCR [[id number]]  (Normal) Collected: 03/31/24 1052    Lab Status: Final result Specimen: Throat Updated: 03/31/24 1130     STREP A PCR Not Detected                   No orders to display              Procedures  Procedures         ED Course                               SBIRT 22yo+      Flowsheet Row Most Recent Value   Initial Alcohol Screen: US AUDIT-C     1. How often do you have a drink containing alcohol? 0 Filed at: 03/31/2024 1026   2. How many drinks containing alcohol do you have on a typical day you are drinking?  0 Filed at: 03/31/2024 1026   3a. Male UNDER 65: How often do you have five or more drinks on one occasion? 0 Filed at: 03/31/2024 1026   3b. FEMALE Any Age, or MALE 65+: How often do you have 4 or more drinks on one occassion? 0 Filed at: 03/31/2024 1026   Audit-C Score 0 Filed at: 03/31/2024 1026   SOPHIA: How many times in the past year have you...    Used an illegal drug or used a prescription medication for non-medical reasons? Never Filed at: 03/31/2024 1026                      Medical Decision Making  23-year-old female presents emergency department for sore throat.  Patient states that her sore throat began approximately late Thursday into Friday.  Patient does have children at home.   Patient denies fevers or chills.  Patient denies cough.  Patient does admit that she is approximately 20  weeks pregnant and has an OB/GYN appointment tomorrow.  Patient is nontoxic.  Patient denies abdominal pain or vaginal discharge.  At this time will test for COVID and influenza RSV.  Will also obtain rapid strep.  Will call patient with results.  Encourage patient to follow-up with OB/GYN next day.  Educated patient on home Tylenol usage.  Encouraged to stay hydrated.  Educated on persistent or worsening signs symptoms or any concern to either follow-up with primary care OB/GYN and or return to the emergency department.  Patient admitted to understanding and agreement was discharged in healthy appearing ambulatory condition.    Amount and/or Complexity of Data Reviewed  Labs: ordered.             Disposition  Final diagnoses:   Cough   Sore throat   20 weeks gestation of pregnancy     Time reflects when diagnosis was documented in both MDM as applicable and the Disposition within this note       Time User Action Codes Description Comment    3/31/2024 11:00 AM Dago Landry [R05.9] Cough     3/31/2024 11:00 AM Dago Landry [J02.9] Sore throat     3/31/2024 11:01 AM Dago Landry [Z3A.20] 20 weeks gestation of pregnancy           ED Disposition       ED Disposition   Discharge    Condition   Stable    Date/Time   Sun Mar 31, 2024 1100    Comment   Nanette Camarillo discharge to home/self care.                   Follow-up Information    None         Discharge Medication List as of 3/31/2024 11:01 AM        CONTINUE these medications which have NOT CHANGED    Details   Prenatal Vit-Fe Fumarate-FA (Prenatal Plus Vitamin/Mineral) 27-1 MG TABS Take 1 tablet by mouth in the morning, Starting Tue 3/5/2024, Normal             No discharge procedures on file.    PDMP Review       None            ED Provider  Electronically Signed by             Dago Landry PA-C  03/31/24 2047

## 2024-04-01 ENCOUNTER — TELEPHONE (OUTPATIENT)
Dept: LABOR AND DELIVERY | Facility: HOSPITAL | Age: 24
End: 2024-04-01

## 2024-04-01 ENCOUNTER — TELEPHONE (OUTPATIENT)
Facility: HOSPITAL | Age: 24
End: 2024-04-01

## 2024-04-01 ENCOUNTER — NURSE TRIAGE (OUTPATIENT)
Age: 24
End: 2024-04-01

## 2024-04-01 ENCOUNTER — ROUTINE PRENATAL (OUTPATIENT)
Facility: HOSPITAL | Age: 24
End: 2024-04-01
Payer: COMMERCIAL

## 2024-04-01 VITALS
HEIGHT: 63 IN | BODY MASS INDEX: 42.15 KG/M2 | WEIGHT: 237.88 LBS | HEART RATE: 82 BPM | SYSTOLIC BLOOD PRESSURE: 112 MMHG | DIASTOLIC BLOOD PRESSURE: 62 MMHG

## 2024-04-01 DIAGNOSIS — Z3A.20 20 WEEKS GESTATION OF PREGNANCY: ICD-10-CM

## 2024-04-01 DIAGNOSIS — Z36.86 ENCOUNTER FOR ANTENATAL SCREENING FOR CERVICAL LENGTH: ICD-10-CM

## 2024-04-01 DIAGNOSIS — Z36.9 UNSPECIFIED ANTENATAL SCREENING: ICD-10-CM

## 2024-04-01 DIAGNOSIS — J11.1 INFLUENZA: Primary | ICD-10-CM

## 2024-04-01 DIAGNOSIS — Z33.1 PREGNANT STATE, INCIDENTAL: ICD-10-CM

## 2024-04-01 DIAGNOSIS — Z36.3 ENCOUNTER FOR ANTENATAL SCREENING FOR MALFORMATION: ICD-10-CM

## 2024-04-01 DIAGNOSIS — O99.212 OBESITY AFFECTING PREGNANCY IN SECOND TRIMESTER, UNSPECIFIED OBESITY TYPE: ICD-10-CM

## 2024-04-01 DIAGNOSIS — O09.299 HISTORY OF FETAL ANOMALY IN PRIOR PREGNANCY, CURRENTLY PREGNANT: Primary | ICD-10-CM

## 2024-04-01 PROCEDURE — 76811 OB US DETAILED SNGL FETUS: CPT | Performed by: OBSTETRICS & GYNECOLOGY

## 2024-04-01 PROCEDURE — 76817 TRANSVAGINAL US OBSTETRIC: CPT | Performed by: OBSTETRICS & GYNECOLOGY

## 2024-04-01 RX ORDER — OSELTAMIVIR PHOSPHATE 75 MG/1
75 CAPSULE ORAL EVERY 12 HOURS SCHEDULED
Qty: 10 CAPSULE | Refills: 0 | Status: SHIPPED | OUTPATIENT
Start: 2024-04-01 | End: 2024-04-06

## 2024-04-01 NOTE — TELEPHONE ENCOUNTER
Patient was scheduled for Thursday 4/11/2024 at our [facility] scan & send. Patient stated she would need LYFT services.     @1312: Called [facility] [employer] spoke with Ed @ # [phone] to set up LYFT transportation for patient's Maternal Fetal Medicine appointment.  Appointment scheduled on  April 11, 2024 at 10:15 AM at our [facility].     Left voicemail for patient and explained LYFT will pick-up patient at 0800 AM for Maternal Fetal Medicine appointment.   Patient instructed she has 5 minutes maximum to get into car upon arrival.     Confirmed phone and address.   [phone]  [address] [place] [id number]

## 2024-04-01 NOTE — PATIENT INSTRUCTIONS
You elected to have non-invasive prenatal screening (NIPS). This involves a blood test to check for the four most common genetic syndromes (Trisomy 21, Trisomy 13, Trisomy 18, and sex chromosome abnormalities). It also MAY report the biologic sex of the fetus if you opted to learn this information. You can call our office to verbally review results to avoid inadvertently learning this information via [facility], if desired. Results will be visible in your [employer] portal 7-10 business days from when the test is drawn. Please follow all instructions regarding insurance cost/coverage provided to you today. Please contact our office with any concerns or questions. You will need spina bifida screening (called MSAFP) for the baby beginning at 15 weeks gestation, which will be ordered by your obstetrician's office. This test allows for earlier detection of spina bifida than is possible by ultrasound, and is advised in all pregnancies.

## 2024-04-01 NOTE — PROGRESS NOTES
Ultrasound Probe Disinfection    A transvaginal ultrasound was performed.   Prior to use, disinfection was performed with High Level Disinfection Process (Joyride).  Probe serial number [id number]: [id number] was used.      Cathy Frankel  04/01/24  10:50 AM

## 2024-04-01 NOTE — PROGRESS NOTES
"[facility]: Nanette Camarillo was seen today for anatomic survey and cervical length screening ultrasound.  See ultrasound report under \"OB Procedures\" tab.   The time spent on this established patient on the encounter date included 6 minutes previsit service time reviewing records and precharting, 10 minutes face-to-face service time counseling regarding results and coordinating care, and  10 minutes charting, totalling 26 minutes.  Please don't hesitate to contact our office with any concerns or questions.  -Zahra Carr MD      "

## 2024-04-01 NOTE — TELEPHONE ENCOUNTER
"Patient called in, she is 21 weeks. She was seen in the ED yesterday and diagnosed with the flu. Her  has the flu as well. She reports a runny nose, sore throat, body aches and chills/sweats. She also is having congestion at night. Discussed safe medication to take. Mersive message sent with medication list. Also discussed using saline nasal spray and sleeping with a humidifier. Discussed tylenol for fevers. If she has fevers that are coming down with tylenol she is to let us know.     The ED did not send Tamiflu, should we send some for her? She did not get her flu shot this season      Reason for Disposition   Influenza (diagnosed by HCP) and no complications    Answer Assessment - Initial Assessment Questions  1. DIAGNOSIS CONFIRMATION: \"When was the influenza diagnosed?\" \"By whom?\" \"Did you get a test for it?\"      Yes   2. MEDICINES: \"Were you prescribed any medications for the influenza?\"  (e.g., zanamivir [Relenza], oseltamavir [Tamiflu]).       No   3. ONSET of SYMPTOMS: \"When did your symptoms start?\"      Friday evening   4. SYMPTOMS: \"What symptoms are you most concerned about?\" (e.g., runny nose, stuffy nose, sore throat, cough, breathing difficulty, fever)      Sore throat, runny nose, cough, congestion at night   5. COUGH: \"How bad is the cough?\"      Denies   6. FEVER: \"Do you have a fever?\" If Yes, ask: \"What is your temperature, how was it measured, and when did it start?\"      Denies   7. RESPIRATORY DISTRESS: \"Are you having any trouble breathing?\" If yes, ask: \"Describe your breathing.\"       Denies   8. FLU VACCINE: \"Did you receive a flu shot this year?\" (e.g., seasonal influenza, H1N1)      Denies   9. PREGNANCY: \"Is there any chance you are pregnant?\" \"When was your last menstrual period?\"      21 weeks    Protocols used: Influenza Follow-Up Call-ADULT-[place]    "

## 2024-04-06 LAB
CFDNA.FET/CFDNA.TOTAL SFR FETUS: NORMAL %
CITATION REF LAB TEST: NORMAL
FET 13+18+21+X+Y ANEUP PLAS.CFDNA: NEGATIVE
FET CHR 21 TS PLAS.CFDNA QL: NEGATIVE
FET CHR 21 TS PLAS.CFDNA QL: NEGATIVE
FET MS X RISK WBC.DNA+CFDNA QL: NOT DETECTED
FET SEX PLAS.CFDNA DOSAGE CFDNA: NORMAL
FET TS 13 RISK PLAS.CFDNA QL: NEGATIVE
FET X + Y ANEUP RISK PLAS.CFDNA SEQ-IMP: NOT DETECTED
GA EST FROM CONCEPTION DATE: NORMAL D
GESTATIONAL AGE > 9:: YES
LAB DIRECTOR NAME PROVIDER: NORMAL
LAB DIRECTOR NAME PROVIDER: NORMAL
LABORATORY COMMENT REPORT: NORMAL
LIMITATIONS OF THE TEST: NORMAL
NEGATIVE PREDICTIVE VALUE: NORMAL
PERFORMANCE CHARACTERISTICS: NORMAL
POSITIVE PREDICTIVE VALUE: NORMAL
REF LAB TEST METHOD: NORMAL
SL AMB NOTE:: NORMAL
TEST PERFORMANCE INFO SPEC: NORMAL

## 2024-04-09 ENCOUNTER — TELEPHONE (OUTPATIENT)
Facility: HOSPITAL | Age: 24
End: 2024-04-09

## 2024-04-09 NOTE — TELEPHONE ENCOUNTER
Called [facility] [employer] spoke with Carter @ # [phone] to set up LYFT transportation for patient's Maternal Fetal Medicine appointment.  Appointment scheduled on  April 11, 2024 at 10:15 AM at our [facility].       Spoke with patient and explained LYFT will pick-up patient at 9:00 AM for Maternal Fetal Medicine appointment.   Patient instructed she has 5 minutes maximum to get into car upon arrival. Patient verbalized understanding.     Confirmed phone and address.   [phone]  [address] [place] [phone]

## 2024-04-11 ENCOUNTER — HOSPITAL ENCOUNTER (OUTPATIENT)
Facility: HOSPITAL | Age: 24
Discharge: HOME | End: 2024-04-11
Attending: STUDENT IN AN ORGANIZED HEALTH CARE EDUCATION/TRAINING PROGRAM | Admitting: STUDENT IN AN ORGANIZED HEALTH CARE EDUCATION/TRAINING PROGRAM
Payer: COMMERCIAL

## 2024-04-11 ENCOUNTER — ROUTINE PRENATAL (OUTPATIENT)
Dept: PERINATAL CARE | Facility: OTHER | Age: 24
End: 2024-04-11
Payer: COMMERCIAL

## 2024-04-11 VITALS
SYSTOLIC BLOOD PRESSURE: 102 MMHG | HEART RATE: 86 BPM | BODY MASS INDEX: 43.62 KG/M2 | HEIGHT: 63 IN | DIASTOLIC BLOOD PRESSURE: 54 MMHG | WEIGHT: 246.2 LBS

## 2024-04-11 VITALS
RESPIRATION RATE: 18 BRPM | TEMPERATURE: 98.6 F | BODY MASS INDEX: 43.59 KG/M2 | OXYGEN SATURATION: 97 % | HEIGHT: 63 IN | HEART RATE: 74 BPM | WEIGHT: 246 LBS | DIASTOLIC BLOOD PRESSURE: 63 MMHG | SYSTOLIC BLOOD PRESSURE: 113 MMHG

## 2024-04-11 DIAGNOSIS — Z3A.22 22 WEEKS GESTATION OF PREGNANCY: ICD-10-CM

## 2024-04-11 DIAGNOSIS — O26.879 SHORT CERVIX, ANTEPARTUM: Primary | ICD-10-CM

## 2024-04-11 DIAGNOSIS — O09.299 HISTORY OF FETAL ANOMALY IN PRIOR PREGNANCY, CURRENTLY PREGNANT: ICD-10-CM

## 2024-04-11 DIAGNOSIS — O99.212 OBESITY AFFECTING PREGNANCY IN SECOND TRIMESTER, UNSPECIFIED OBESITY TYPE: Primary | ICD-10-CM

## 2024-04-11 DIAGNOSIS — O34.219 HISTORY OF CESAREAN DELIVERY, ANTEPARTUM: ICD-10-CM

## 2024-04-11 DIAGNOSIS — O26.872 SHORT CERVIX DURING PREGNANCY IN SECOND TRIMESTER: ICD-10-CM

## 2024-04-11 LAB
BACTERIA UR QL AUTO: ABNORMAL /HPF
BILIRUB UR QL STRIP: NEGATIVE
CLARITY UR: CLEAR
COLOR UR: YELLOW
GLUCOSE UR STRIP-MCNC: NEGATIVE MG/DL
HGB UR QL STRIP.AUTO: NEGATIVE
KETONES UR STRIP-MCNC: NEGATIVE MG/DL
LEUKOCYTE ESTERASE UR QL STRIP: NEGATIVE
MUCOUS THREADS UR QL AUTO: ABNORMAL
NITRITE UR QL STRIP: NEGATIVE
NON-SQ EPI CELLS URNS QL MICRO: ABNORMAL /HPF
PH UR STRIP.AUTO: 6 [PH]
PROT UR STRIP-MCNC: ABNORMAL MG/DL
RBC #/AREA URNS AUTO: ABNORMAL /HPF
SP GR UR STRIP.AUTO: 1.03 (ref 1–1.03)
UROBILINOGEN UR STRIP-ACNC: <2 MG/DL
WBC #/AREA URNS AUTO: ABNORMAL /HPF

## 2024-04-11 PROCEDURE — 99244 OFF/OP CNSLTJ NEW/EST MOD 40: CPT | Performed by: OBSTETRICS & GYNECOLOGY

## 2024-04-11 PROCEDURE — 81001 URINALYSIS AUTO W/SCOPE: CPT

## 2024-04-11 PROCEDURE — 76815 OB US LIMITED FETUS(S): CPT | Performed by: OBSTETRICS & GYNECOLOGY

## 2024-04-11 PROCEDURE — NC001 PR NO CHARGE: Performed by: OBSTETRICS & GYNECOLOGY

## 2024-04-11 PROCEDURE — 76817 TRANSVAGINAL US OBSTETRIC: CPT | Performed by: OBSTETRICS & GYNECOLOGY

## 2024-04-11 PROCEDURE — NC001 PR NO CHARGE: Performed by: STUDENT IN AN ORGANIZED HEALTH CARE EDUCATION/TRAINING PROGRAM

## 2024-04-11 PROCEDURE — 99213 OFFICE O/P EST LOW 20 MIN: CPT

## 2024-04-11 RX ORDER — PROGESTERONE 100 MG/1
200 CAPSULE ORAL
Qty: 14 CAPSULE | Refills: 6 | Status: SHIPPED | OUTPATIENT
Start: 2024-04-11

## 2024-04-11 RX ORDER — ACETAMINOPHEN 325 MG/1
975 TABLET ORAL ONCE
Status: COMPLETED | OUTPATIENT
Start: 2024-04-11 | End: 2024-04-11

## 2024-04-11 RX ADMIN — ACETAMINOPHEN 975 MG: 325 TABLET, FILM COATED ORAL at 14:34

## 2024-04-11 NOTE — PROGRESS NOTES
Pt seen and examined in triage.    (Late entry).    Abdomen soft and gravid, nontender.    Problem List Items Addressed This Visit          Obstetrics/Gynecology    * (Principal) Short cervical length during pregnancy - Primary     Reviewed images; shortest best image appears to be 23.7mm.    She has no history of spontaneous  birth.  I would not offer a cerclage at this time given that her cervical length is well above 10 mm and also because of cramping earlier.  OB team evaluated her; their note is to follow however report from Dr. Tamayo reported a closed sterile vaginal exam with a cervix that is soft and an open external os but and closed internal os and the cervical length felt long.    In light of cervical length under 25 mm I recommend consideration of initiation of vaginal progesterone.    In light of cramps I offered overnight observation.  She declines and states that she has to get her child at the bus stop.    Strict return precautions were reviewed with the patient.  We discussed instructions for vaginal progesterone.  She indicates she has a follow-up appointment scheduled:    Future Appointments   Date Time Provider Department Center   2024 10:15 AM  US 2 [facility]   2024  2:00 PM [facility]   2024  4:15 PM LE[facility]IDES Yusuf [facility]   2024 11:30 AM LE[facility]ES Yusuf [facility]   2024 11:30 AM  US 3 [facility]     All questions answered.           Relevant Medications    Progesterone 100 MG CAPS    Other Relevant Orders    Inpatient consult to Perinatology     At the conclusion of today's encounter, all questions were answered to her satisfaction. Thank you very much for this kind referral and please do not hesitate to contact me with any further questions or concerns.    Mery Monroy MD  Attending Physician, Maternal-Fetal Medicine  [facility]  [facility]

## 2024-04-11 NOTE — PROGRESS NOTES
L&D Triage Note - OB/GYN  Nanette Camarillo 24 y.o. female MRN: [id number]  Unit/Bed#:  TRIAGE  Encounter: [id number]        Patient is seen by [facility]     ASSESSMENT/PLAN  Nanette Camarillo is a 24 y.o.  at 22w3d here for rule out  labor in the setting of abdominal pain and new shortened cervix on MFM TVUS today. SVE 0/0/-4. Cervix is soft but posterior NST reactive. [place] with no contractions. SVE unremarkable. S/p MFM consultation during which inpatient admission was offered but declined. Plan to initiate vaginal progesterone. Pain likely round ligament pain and pain associated with fetal movement. Return precautions dicussed.       1) New shortened cervix in the setting of abdominal pain  - Patient seen for MFM scan today when short cervix of 2.15 cm with 1.19 cm funnel appreciated   - pelvic pressure associated with standing and abdominal pain associated with fetal movement  - Speculum exam: normal appearing external female genitalia, vagina, and cervix without lesion or laceration. Multiparous appearing cervix, visibly closed. Scant physiologic discharge.     MOISÉS/[place]et [place]:     Infection:   - neg clue cells    - neeg hyphae   - no trichomonads present    Membrane status   - neg ferning   - neg pooling     SVE:  Cervical Dilation: Closed  Cervical Effacement: 0  Cervical Consistency: Medium  Fetal Station: -3  Presentation: Transverse (TAUS)  Position: Unknown  Method: Manual  OB Examiner: Belkis    FHT:  146 bpm via TAUS    TOCO:   No contractions    IMAGING:       TVUS completed by US today by MFM   Cervical length   The indications for this exam were previous pregnancy with cardiac defect,  obesity, previous  & cervical evaluation.     Exam Types     Transvaginal Ultrasound     RESULTS     Fetus # 1 of 1  Breech presentation  Placenta Location = Posterior  No placenta previa     AMNIOTIC FLUID        Largest Vertical Pocket = 6.0 cm  Amniotic Fluid: Normal     CERVICAL  EVALUATION     The cervix appeared abnormal (Ultrasound Examination).     SUPINE  Cervical Length: 2.15 cm             Funnel Length: 1.19 cm  Funnel Width: 0.94 cm                 Funnelin.63 %     OTHER TEST RESULTS  Funneling?: Yes              Dynamic Changes?: No     ANATOMY COMMENTS     The prior fetal anatomic survey was limited with respect to evaluation of  the LVOT, Septum.  These anatomic views were seen today as sonographically  normal within the inherent limitations of fetal ultrasound and the  anatomic survey is now complete.     IMPRESSION     [place] IUP  22 weeks and 3 days by LMP. (JAIME=AUG 12 2024)  Breech presentation  Regular fetal heart rate of 142 bpm  Posterior placenta  No placenta previa        TAUS   AMERICO      - LVP 4.56 cm   Placenta: posterior   Presentation: transverse     2)  Discharge instructions  - Patient instructed to call if experiencing worsening contractions, vaginal bleeding, loss of fluid or decreased fetal movement.  - Will follow up with OBGYN in office    D/w Vasquez Pretty and Eliana  ______________    SUBJECTIVE    JAIME: Estimated Date of Delivery: 24    HPI:  24 y.o.  22w3d presents with complaint of pelvic pain and pressure. The pain comes randomly, associated with fetal and maternal movement. Its worse when she stands. She had some relief with supporting her belly with her hands. This pain started in the last few days. There is no regular pattern to the pain.     Contractions: denies  Leakage of fluid: denies  Vaginal Bleeding: denies  Fetal movement: present    Her obstetrical history is significant for 2 term deliveries. First was a C/S for fetal intolerance in . Then she had a successful  in 2019. She reports no complications in any of her pregnancies or deliveries. She reports she did have a 16 weeks loss s/p suction D&E. She denies any bleeding problems with that surgery.     ROS:  Constitutional: Negative  Respiratory:  "Negative  Cardiovascular: Negative    Gastrointestinal: Negative    Physical Exam  GEN: Well appearing, no apparent distress   ABD: Gravid, soft, nontender  SVE: Cervical Dilation: Closed  Cervical Effacement: 0  Cervical Consistency: Medium  Fetal Station: -3  Presentation: Transverse (TAUS)  Position: Unknown  Method: Manual  OB Examiner: Belkis  Speculum exam as above    OBJECTIVE:  /63   Pulse 74   Temp 98.6 °F (37 °C) (Oral)   Resp 18   Ht 5' 3\" (1.6 m)   Wt 112 kg (246 lb)   LMP 11/06/2023   SpO2 97%   BMI 43.58 kg/m²   Body mass index is 43.58 kg/m².  Labs:   Recent Results (from the past 24 hour(s))   UA w Reflex to Microscopic w Reflex to Culture    Collection Time: 04/11/24  1:06 PM    Specimen: Urine, Clean Catch   Result Value Ref Range    Color, UA Yellow     Clarity, UA Clear     Specific Gravity, UA 1.027 1.003 - 1.030    pH, UA 6.0 4.5, 5.0, 5.5, 6.0, 6.5, 7.0, 7.5, 8.0    Leukocytes, UA Negative Negative    Nitrite, UA Negative Negative    Protein, UA Trace (A) Negative mg/dl    Glucose, UA Negative Negative mg/dl    Ketones, UA Negative Negative mg/dl    Urobilinogen, UA <2.0 <2.0 mg/dl mg/dl    Bilirubin, UA Negative Negative    Occult Blood, UA Negative Negative   Urine Microscopic    Collection Time: 04/11/24  1:06 PM   Result Value Ref Range    RBC, UA None Seen None Seen, 1-2 /hpf    WBC, UA 1-2 None Seen, 1-2 /hpf    Epithelial Cells Occasional None Seen, Occasional /hpf    Bacteria, UA Occasional None Seen, Occasional /hpf    MUCUS THREADS Moderate (A) None Seen         Em Tamayo MD  OB/GYN PGY-1  4/11/2024  7:36 PM      Portions of the record may have been created with voice recognition software.  Occasional wrong word or \"sound a like\" substitutions may have occurred due to the inherent limitations of voice recognition software.  Read the chart carefully and recognize, using context, where substitutions have occurred   "

## 2024-04-11 NOTE — ASSESSMENT & PLAN NOTE
Reviewed images; shortest best image appears to be 23.7mm.    She has no history of spontaneous  birth.  I would not offer a cerclage at this time given that her cervical length is well above 10 mm and also because of cramping earlier.  OB team evaluated her; their note is to follow however report from Dr. Tamayo reported a closed sterile vaginal exam with a cervix that is soft and an open external os but and closed internal os and the cervical length felt long.    In light of cervical length under 25 mm I recommend consideration of initiation of vaginal progesterone.    In light of cramps I offered overnight observation.  She declines and states that she has to get her child at the bus stop.    Strict return precautions were reviewed with the patient.  We discussed instructions for vaginal progesterone.  She indicates she has a follow-up appointment scheduled:    Future Appointments   Date Time Provider Department Center   2024 10:15 AM  US 2 [facility]   2024  2:00 PM [facility]   2024  4:15 PM LEONIDES Yusuf [facility]   2024 11:30 AM [facility]ES Yusuf [facility]   2024 11:30 AM  US 3 [facility]     All questions answered.

## 2024-04-11 NOTE — CONSULTS
Consultation - Maternal Fetal Medicine   Nanette Camarillo 24 y.o. female MRN: [id number]  Unit/Bed#: LD TRIAGE 1- Encounter: [id number]  Admit date: 2024.  Today's date: 24    Assessment/Plan   Ms. Camarillo is a 24 y.o. year-old  at 22w3d, Hospital Day: 1, admitted for short cervix.  By issue:    * Short cervical length during pregnancy  Assessment & Plan  Assessment:   Patient seen for MFM scan today when short cervix of 2.15 cm with 1.19 cm funnel appreciated  Patient endorses pain which arises with fetal movement but denies any contractions   SVE closed   UA without signs of infection     Plan:   Patient offered inpatient admission for continued observation to allow for possible intervention should shortening progress and she declines at this time   At this time, patient opts for outpatient management with vaginal progesterone   Discussed return precautions including contractions, vaginal bleeding, leakage of fluid, decreased fetal movement            Chief Complaint   Patient presents with    Pelvic Pain       Physician Requesting Consult: No att. providers found  Subspeciality: Perinatology    Dear Dr. Pretty,    Thank you for referring patient Nanette Camarillo for Maternal-Fetal Medicine consultation  HPI: As you know, Ms. Camarillo is a 24 y.o. year-old  with an JAIME of Estimated Date of Delivery: 24 at 22w3d, who was sent to labor and delivery for evaluation of short cerivx. Nanette reports discomfort which she associates with fetal movement.  Her current pregnancy is significant for prior .  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     Other obstetric review of symptoms:  Contractions: None.    Leakage of fluid: None.    Bleeding: None.    Fetal movement: present.        Pertinent items are noted in HPI.      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     Gynecologic history: Patient's last menstrual period was 2023.     Past Medical History:   Diagnosis Date    Allergic     Morphine    Anxiety     previously on meds Buspar    Arthritis     Possible Arthritis in knee    Asthma     Depression     History of anxiety     Low grade squamous intraepithelial lesion on cytologic smear of cervix (LGSIL)     Formatting of this note might be different from the original.   Patient to have Co testing in 1 year    Obesity     Psychiatric disorder     depression, anxiety     Past Surgical History:   Procedure Laterality Date     SECTION      IL TX MISSED  FIRST TRIMESTER SURGICAL N/A 2023    Procedure: DILATATION AND EVACUATION (D&E) 17 WEEKS;  Surgeon: Serenity Calvo MD;  Location: [facility];  Service: Gynecology     Social History   Social History     Substance and Sexual Activity   Alcohol Use No     Social History     Substance and Sexual Activity   Drug Use Not Currently    Types: Marijuana    Comment: stopped  using  in january     Social History     Tobacco Use   Smoking Status Former    Current packs/day: 0.00    Types: Cigarettes    Quit date: 2023    Years since quittin.0    Passive exposure: Past   Smokeless Tobacco Never       Meds/Allergies   Prior to Admission Medications   Prescriptions Last Dose Informant Patient Reported? Taking?   Prenatal Vit-Fe Fumarate-FA (Prenatal Plus  "Vitamin/Mineral) 27-1 MG TABS Past Week  No Yes   Sig: Take 1 tablet by mouth in the morning      Facility-Administered Medications: None     Medication Administration - last 24 hours from 04/10/2024 1541 to 04/11/2024 1541         Date/Time Order Dose Route Action Action by     04/11/2024 1434 EDT acetaminophen (TYLENOL) tablet 975 mg 975 mg Oral Given Yamilex Levine, TRICE          No current facility-administered medications for this encounter.     Allergies   Allergen Reactions    Morphine Itching    Wound Dressing Adhesive Blisters     Surgical tape       Objective    Patient Vitals for the past 24 hrs:   BP Temp Temp src Pulse Resp SpO2 Height Weight   04/11/24 1232 113/63 -- -- 74 -- -- -- --   04/11/24 1223 113/63 98.6 °F (37 °C) Oral 70 18 97 % 5' 3\" (1.6 m) 112 kg (246 lb)     Vitals: Blood pressure 113/63, pulse 74, temperature 98.6 °F (37 °C), temperature source Oral, resp. rate 18, height 5' 3\" (1.6 m), weight 112 kg (246 lb), last menstrual period 11/06/2023, SpO2 97%, unknown if currently breastfeeding.Body mass index is 43.58 kg/m².    Physical Exam  Constitutional:       General: She is not in acute distress.     Appearance: Normal appearance.   HENT:      Head: Normocephalic and atraumatic.   Cardiovascular:      Rate and Rhythm: Normal rate.   Pulmonary:      Effort: Pulmonary effort is normal.   Abdominal:      General: There is no distension.      Palpations: Abdomen is soft.   Skin:     General: Skin is warm and dry.   Neurological:      General: No focal deficit present.      Mental Status: She is alert.   Psychiatric:         Mood and Affect: Mood normal.           Fetal data:  Fetal heart tones 160s          Veronica Manuel MD  4/11/2024  3:41 PM          "

## 2024-04-11 NOTE — PROGRESS NOTES
Ultrasound Probe Disinfection    A transvaginal ultrasound was performed.   Prior to use, disinfection was performed with High Level Disinfection Process ([employer]).  Probe serial number [id number] was used.      Shantelle Newsome  04/11/24  10:16 AM

## 2024-04-11 NOTE — PROGRESS NOTES
The patient was seen today for an ultrasound.  Please see ultrasound report (located under Ob Procedures) for additional details.   Thank you very much for allowing us to participate in the care of this very nice patient.  Should you have any questions, please do not hesitate to contact me.     Sachin Hinds MD FACOG  Attending Physician, Maternal-Fetal Medicine  [facility]

## 2024-04-11 NOTE — ASSESSMENT & PLAN NOTE
Assessment:   Patient seen for MFM scan today when short cervix of 2.15 cm with 1.19 cm funnel appreciated  Patient endorses pain which arises with fetal movement but denies any contractions   SVE closed   UA without signs of infection     Plan:   Patient offered inpatient admission for continued observation to allow for possible intervention should shortening progress and she declines at this time   At this time, patient opts for outpatient management with vaginal progesterone   Discussed return precautions including contractions, vaginal bleeding, leakage of fluid, decreased fetal movement

## 2024-04-11 NOTE — PROGRESS NOTES
@ 2264 Called [facility] and spoke with Ed. to set up LYFT. Sending patient to [facility] [place] [id number].

## 2024-04-16 ENCOUNTER — TELEPHONE (OUTPATIENT)
Dept: PERINATAL CARE | Facility: OTHER | Age: 24
End: 2024-04-16

## 2024-04-16 NOTE — TELEPHONE ENCOUNTER
Called [employer] spoke with Carter @ # [phone] to set up LYFT transportation for patient's Maternal Fetal Medicine appointment.  Appointment scheduled on  April 17, 2024 at 10:15 AM at our [place].       Spoke with patient and explained LYFT will pick-up patient at 9:00 AM for Maternal Fetal Medicine appointment.   Patient instructed she has 5 minutes maximum to get into car upon arrival. Patient verbalized understanding.     Confirmed phone and address.   [phone]  [address] [place] [phone]

## 2024-04-17 ENCOUNTER — ROUTINE PRENATAL (OUTPATIENT)
Dept: PERINATAL CARE | Facility: OTHER | Age: 24
End: 2024-04-17
Payer: COMMERCIAL

## 2024-04-17 VITALS
BODY MASS INDEX: 43.77 KG/M2 | HEIGHT: 63 IN | DIASTOLIC BLOOD PRESSURE: 60 MMHG | SYSTOLIC BLOOD PRESSURE: 108 MMHG | HEART RATE: 92 BPM | WEIGHT: 247 LBS

## 2024-04-17 DIAGNOSIS — Z3A.23 23 WEEKS GESTATION OF PREGNANCY: ICD-10-CM

## 2024-04-17 DIAGNOSIS — O26.872 SHORT CERVICAL LENGTH DURING PREGNANCY IN SECOND TRIMESTER: ICD-10-CM

## 2024-04-17 DIAGNOSIS — O09.299 HISTORY OF FETAL ANOMALY IN PRIOR PREGNANCY, CURRENTLY PREGNANT: Primary | ICD-10-CM

## 2024-04-17 PROCEDURE — 93325 DOPPLER ECHO COLOR FLOW MAPG: CPT | Performed by: OBSTETRICS & GYNECOLOGY

## 2024-04-17 PROCEDURE — 76825 ECHO EXAM OF FETAL HEART: CPT | Performed by: OBSTETRICS & GYNECOLOGY

## 2024-04-17 PROCEDURE — 76827 ECHO EXAM OF FETAL HEART: CPT | Performed by: OBSTETRICS & GYNECOLOGY

## 2024-04-17 PROCEDURE — 99213 OFFICE O/P EST LOW 20 MIN: CPT | Performed by: OBSTETRICS & GYNECOLOGY

## 2024-04-17 PROCEDURE — 76817 TRANSVAGINAL US OBSTETRIC: CPT | Performed by: OBSTETRICS & GYNECOLOGY

## 2024-04-17 NOTE — LETTER
2024     LEONIDES Yusuf  [address]  [address]  [place] [address]    Patient: Nanette Camarillo   YOB: 2000   Date of Visit: 2024       Dear Dr. Osuna:    Thank you for referring Nanette Camarillo to me for evaluation. Below are my notes for this consultation.    If you have questions, please do not hesitate to call me. I look forward to following your patient along with you.         Sincerely,        Johanna Hannon MD        CC: No Recipients    Johanna Hannon MD  2024  1:26 PM  Sign when Signing Visit  Nanette Camarillo  has no complaints today at 23w2d. She reports fetal movements and does not report any vaginal bleeding or signs of labor.  Her recently completed fetal testing revealed a normal NIPT and MSAFP. Hemoglobin A1c is 5.5 Fasting blood sugar was 83 showing no evidence for early onset GDM. She is here today for an ultrasound for a fetal echo and cervical length    Problem list:  Prior  section in  followed by  in 2019  History of a prior fetal demise and a fetus with known heterotaxy syndrome.  Normal female MicroArray noted.  Short cervix 2.9 cm was noted at 21 weeks.  Measurements were 2.15 cm at 22 weeks and 3 days.  She is on vaginal progesterone till 36 weeks and 6 days.    Ultrasound findings:  The ultrasound today shows a normal fetal echo and her cervix length today is normal    Pregnancy ultrasound has limitations and is unable to detect all forms of fetal congenital abnormalities.      Follow up recommended:   Recommend a follow-up ultrasound at 32 weeks for growth.  Pelvic rest recommended due to her hx of a short cervix.     Pre visit time reviewing her records   10 minutes  Face to face time 5 minutes  Post visit time on documentation of note, updating her problem list, adding orders and prescriptions 5 minutes.  Procedures that were completed today were charged separately.   The level of decision making was low level  complexity.    Johanna Hannon MD

## 2024-04-17 NOTE — LETTER
2024     LEONIDES Yusuf  [address]  [address]  [place] [address]    Patient: Nanette Camarillo   YOB: 2000   Date of Visit: 2024       Dear Dr. Osuna:    Thank you for referring Nanette Camarillo to me for evaluation. Below are my notes for this consultation.    If you have questions, please do not hesitate to call me. I look forward to following your patient along with you.         Sincerely,        Johanna Hannon MD        CC: No Recipients    Johanna Hannon MD  2024 12:01 PM  Sign when Signing Visit  Nanette Camarillo  has no complaints today at 23w2d. She reports fetal movements and does not report any vaginal bleeding or signs of labor.  Her recently completed fetal testing revealed a normal NIPT and MSAFP. Hemoglobin A1c is 5.5 Fasting blood sugar was 83 showing no evidence for early onset GDM. She is here today for an ultrasound for a fetal echo and cervical length    Problem list:  Prior  section in  followed by  in 2019  History of a prior fetal demise and a fetus with known heterotaxy syndrome.  Normal female MicroArray noted.  Short cervix 2.9 cm was noted at 21 weeks.  Measurements were 2.15 cm at 22 weeks and 3 days.  She is on vaginal progesterone till 36 weeks and 6 days.    Ultrasound findings:  The ultrasound today shows a normal fetal echo and her cervix length today is normal    Pregnancy ultrasound has limitations and is unable to detect all forms of fetal congenital abnormalities.      Follow up recommended:   Recommend a follow-up ultrasound at 32 weeks for growth.  Pelvic rest recommended due to her hx of a short cervix.     Pre visit time reviewing her records   10 minutes  Face to face time 5 minutes  Post visit time on documentation of note, updating her problem list, adding orders and prescriptions 5 minutes.  Procedures that were completed today were charged separately.   The level of decision making was low level  complexity.    Johanan Hannon MD

## 2024-04-25 ENCOUNTER — ROUTINE PRENATAL (OUTPATIENT)
Dept: OBGYN CLINIC | Facility: CLINIC | Age: 24
End: 2024-04-25

## 2024-04-25 VITALS — DIASTOLIC BLOOD PRESSURE: 60 MMHG | BODY MASS INDEX: 43.58 KG/M2 | SYSTOLIC BLOOD PRESSURE: 120 MMHG | WEIGHT: 246 LBS

## 2024-04-25 DIAGNOSIS — Z36.89 ENCOUNTER FOR OTHER SPECIFIED ANTENATAL SCREENING: ICD-10-CM

## 2024-04-25 DIAGNOSIS — Z34.82 PRENATAL CARE, SUBSEQUENT PREGNANCY, SECOND TRIMESTER: Primary | ICD-10-CM

## 2024-04-25 PROCEDURE — PNV: Performed by: NURSE PRACTITIONER

## 2024-04-25 NOTE — PROGRESS NOTES
Nanette Camarillo is a 24 y.o.  at 24w3d. Reports +FM  She is doing well, with exception of Has that occur a couple times per week. Denies LOF/Bleeding/Cramping. Denies n/v. Mild LE edema. Denies tobacco, drugs or ETOH use. Denies DV.     Fetal Echo completed on  - normal.    Visit Vitals  /60   Wt 112 kg (246 lb)   LMP 2023   BMI 43.58 kg/m²   OB Status Pregnant   Smoking Status Every Day   BSA 2.11 m²       Urine neg/neg      Diagnoses and all orders for this visit:    Prenatal care, subsequent pregnancy, second trimester    Encounter for other specified  screening  -     Glucose, 1H PG  -     CBC and differential  -     Anemia Panel w/Reflex, OB; Future  -     RPR-Syphilis Screening (Total Syphilis IGG/IGM); Future  -     Type and screen; Future  -     Type and screen      Increase hydration to prevent recurrent HA.    Growth scan scheduled in .     RTO in 4 weeks for PNV or sooner if needed.

## 2024-05-13 ENCOUNTER — NURSE TRIAGE (OUTPATIENT)
Dept: OTHER | Facility: OTHER | Age: 24
End: 2024-05-13

## 2024-05-14 ENCOUNTER — HOSPITAL ENCOUNTER (OUTPATIENT)
Facility: HOSPITAL | Age: 24
Discharge: HOME | End: 2024-05-14
Attending: OBSTETRICS & GYNECOLOGY | Admitting: OBSTETRICS & GYNECOLOGY
Payer: COMMERCIAL

## 2024-05-14 VITALS
DIASTOLIC BLOOD PRESSURE: 58 MMHG | SYSTOLIC BLOOD PRESSURE: 108 MMHG | HEIGHT: 63 IN | HEART RATE: 76 BPM | BODY MASS INDEX: 43.58 KG/M2 | TEMPERATURE: 98.2 F | RESPIRATION RATE: 18 BRPM

## 2024-05-14 PROBLEM — N94.9 ROUND LIGAMENT PAIN: Status: ACTIVE | Noted: 2024-05-14

## 2024-05-14 PROBLEM — Z3A.27 27 WEEKS GESTATION OF PREGNANCY: Status: ACTIVE | Noted: 2024-05-14

## 2024-05-14 PROCEDURE — 76817 TRANSVAGINAL US OBSTETRIC: CPT

## 2024-05-14 PROCEDURE — 99214 OFFICE O/P EST MOD 30 MIN: CPT

## 2024-05-14 PROCEDURE — NC001 PR NO CHARGE: Performed by: OBSTETRICS & GYNECOLOGY

## 2024-05-14 PROCEDURE — 76817 TRANSVAGINAL US OBSTETRIC: CPT | Performed by: OBSTETRICS & GYNECOLOGY

## 2024-05-14 RX ORDER — LIDOCAINE 50 MG/G
2 PATCH TOPICAL DAILY
Status: DISCONTINUED | OUTPATIENT
Start: 2024-05-14 | End: 2024-05-14 | Stop reason: HOSPADM

## 2024-05-14 RX ORDER — LIDOCAINE 50 MG/G
2 PATCH TOPICAL DAILY
Status: DISCONTINUED | OUTPATIENT
Start: 2024-05-14 | End: 2024-05-14

## 2024-05-14 RX ORDER — ACETAMINOPHEN 500 MG
500 TABLET ORAL EVERY 6 HOURS PRN
COMMUNITY

## 2024-05-14 RX ADMIN — LIDOCAINE 2 PATCH: 700 PATCH TOPICAL at 01:57

## 2024-05-14 NOTE — PROGRESS NOTES
L&D Triage Note - OB/GYN  Nanette Camarillo 24 y.o. female MRN: [id number]  Unit/Bed#:  TRIAGE  Encounter: [id number]      ASSESSMENT:    Nanette Camarillo is a 24 y.o.  at 27w1d who was evaluated today in triage to rule out labor. SVE 0/0/-3 with no contractions seen on tocometer. Reassuring work up, the patient does not appear to be in labor and it is safe to discharge home.     PLAN:    1) Round Ligament Pain  - SVE 0/0/-3  - [place]: no contractions  - Recommend supportive care: Tylenol, abdominal band, warm baths, slow movements, lidocaine patch  - Known short cervix, unchanged from previous US.    2) 27 weeks gestation of pregnancy  - Continue routine prenatal care  - Discharge from OB triage with labor precautions    - Reviewed rupture of membranes, false vs true labor, decreased fetal movement, and vaginal bleeding   - Pt to call provider with any concerns and follow up at her next scheduled prenatal appointment    - Case discussed with Dr. Calvo    SUBJECTIVE:    Nanette Camarillo 24 y.o.  at 27w1d with an Estimated Date of Delivery: 24 presenting with abdominal pain. Pt reports she had lower abdominal pain onset 2 days ago, with radiation down towards pubic area. Worse with movement and has been unable to get comfortable. Has tried Tylenol, hot/cold compresses, abdominal band, warm showers without improvement of her symptoms. Denies CP, SOB, N/V, headache, vision changes, dysuria, hematuria, LE swelling, fever, or any other concerns at this time.      Her past obstetrical history is significant for miscarriage at 15 weeks, 1 CS and 1 . This pregnancy has been uncomplicated.    Contractions: none  Leakage of fluid: Denies  Vaginal Bleeding: Denies  Fetal movement: present    OBJECTIVE:    Vitals:    24 0045   BP: 108/58   Pulse: 76   Resp: 18   Temp: 98.2 °F (36.8 °C)       ROS:  Constitutional: Negative  Respiratory: Negative  Cardiovascular: Negative    Gastrointestinal:  Negative    General Physical Exam:  General: Well appearing, appears uncomfortable  Respiratory: Unlabored breathing  Cardiovascular: Regular rate.  Abdomen: Soft, gravid, diffuse discomfort to palpation, worse in bilateral suprapubic region.  Fundal Height: Appropriate for gestational age.  Extremities: Warm and well perfused.  Non tender.    Speculum exam:  Normal external female genitalia  Cervix fully visualized and not visibly dilated  Physiologic discharge  No bleeding, pooling, abnormal discharge, or lesions noted      Microscopy:     Infection:   - no clue cells    - no hyphae   - no trichomonads present    Membrane status   - no ferning   - no nitrazene   - no pooling     SVE: 0/0/-3  Method: Manual  OB Examiner: Rosa Elena    Fetal monitoring:  Fetal heart rate: Baseline Rate (FHR): 135 bpm  Variability: Moderate  Accelerations: 10 x 10 (<32 weeks)  Decelerations: None  [place]: Contraction Frequency (minutes): 0    IMAGING:       TVUS   Cervical length         - 2.89 cm         - 2.5 cm         - 2.82 cm   Final length: 2.5      Vera Piedra MD  PGY-1  05/14/24  2:20 AM

## 2024-05-14 NOTE — TELEPHONE ENCOUNTER
"Regardin wks / pain /pressure  ----- Message from Lolly Ortiz sent at 2024 11:01 PM EDT -----  \"I'm 27 weeks and I'm having a lot of discomfort and pain and pressure\"    " NO refills are needed until July. I will see him before that for med renewal appt anyway. Declined this refill today.   Klarissa Lester MD  04/21/21  4:59 PM

## 2024-05-14 NOTE — TELEPHONE ENCOUNTER
"Reason for Disposition  • Increased pressure in pelvic area    Additional Information  • [1] Having contractions or other symptoms of labor (such as vaginal pressure) AND [2] < 37 weeks pregnant (i.e., )    Answer Assessment - Initial Assessment Questions  1. LOCATION: \"Where does it hurt?\"       Vaginal area and rectum    2. RADIATION: \"Does the pain shoot anywhere else?\" (e.g., chest, back)      Denies     3. ONSET: \"When did the pain begin?\" (Minutes, hours or days ago)       A couple days ago    4. ONSET: \"Gradual or sudden onset?\"      Gradual    5. PATTERN: \"Does the pain come and go, or has it been constant since it started?\"       Constant     6. SEVERITY: \"How bad is the pain?\" \"What does it keep you from doing?\"  (e.g., Scale 1-10; mild, moderate, or severe)    - MILD (1-3): doesn't interfere with normal activities, abdomen soft and not tender to touch     - MODERATE (4-7): interferes with normal activities or awakens from sleep, tender to touch     - SEVERE (8-10): excruciating pain, doubled over, unable to do any normal activities      When sitting, is severe.  When standing is moderate    7. RECURRENT SYMPTOM: \"Have you ever had this type of stomach pain before?\" If Yes, ask: \"When was the last time?\" and \"What happened that time?\"       Denies     8. CAUSE: \"What do you think is causing the stomach pain?      Unsure     9. RELIEVING/AGGRAVATING FACTORS: \"What makes it better or worse?\" (e.g., antacids, bowel movement, movement)      Standing makes the pain improve a little.    10. FETAL MOVEMENT: \"Has the baby's movement decreased or changed significantly from normal?\"        Normal    11. OTHER SYMPTOMS: \"Has there been any vaginal bleeding, fever, vomiting, diarrhea, or urine problems?\"        Diarrhea yesterday    12. JAIME: \"What date are you expecting to deliver?\"        24    .    Contacted on-call provider who advised patient continue to monitor, try a belly support band and/or " Tylenol as needed.  It is likely that this pregnancy does not have the same muscle support that her first did.  On-call provider also advised patient stay hydrated, and if she is experiencing contractions or if she is unable to control the pain at home, she should come in to L&D for evaluation.    Contacted patient to make her aware of on-call provider's recommendations.  She reports she has tried Tylenol multiple times, a heating pad, a hot shower, and rest and nothing is helping.  She reports that she is going to go to [facility] L[facility] for evaluation.    Contacted on-call provider and [facility] L&D charge nurse to make aware of above information.    Protocols used: Pregnancy - Abdominal Pain Greater Than 20 Weeks EGA-ADULT-AH, Pregnancy - Labor - -ADULT-AH

## 2024-05-14 NOTE — PROCEDURES
Nanette LARRY Marquise, a  at 27w1d with an JAIME of 2024, by Last Menstrual Period, was seen at [facility] for the following procedure(s): $Procedure Type: US - Transvaginal]                   Ultrasound Other  Cervical Length: 2.82  Placenta Previa: No  Vasa Previa: No      US performed by Dr. [place]       Ultrasound Probe Disinfection    A transvaginal ultrasound was performed.   Prior to use, disinfection was performed with High Level Disinfection Process ([employer])        Ariana Jewell MD  24  5:41 AM

## 2024-05-15 NOTE — TELEPHONE ENCOUNTER
Left message for pt to call the office.  Wanted to see how she was feeling and advised to call if any further concerns.  Her next ob visit is scheduled for 5/23/2024.

## 2024-05-23 ENCOUNTER — ROUTINE PRENATAL (OUTPATIENT)
Dept: OBGYN CLINIC | Facility: CLINIC | Age: 24
End: 2024-05-23
Payer: COMMERCIAL

## 2024-05-23 VITALS — DIASTOLIC BLOOD PRESSURE: 60 MMHG | BODY MASS INDEX: 44.64 KG/M2 | SYSTOLIC BLOOD PRESSURE: 100 MMHG | WEIGHT: 252 LBS

## 2024-05-23 DIAGNOSIS — Z34.83 PRENATAL CARE, SUBSEQUENT PREGNANCY, THIRD TRIMESTER: Primary | ICD-10-CM

## 2024-05-23 DIAGNOSIS — Z23 ENCOUNTER FOR IMMUNIZATION: ICD-10-CM

## 2024-05-23 PROCEDURE — 90471 IMMUNIZATION ADMIN: CPT | Performed by: NURSE PRACTITIONER

## 2024-05-23 PROCEDURE — PNV: Performed by: NURSE PRACTITIONER

## 2024-05-23 PROCEDURE — 90715 TDAP VACCINE 7 YRS/> IM: CPT | Performed by: NURSE PRACTITIONER

## 2024-05-23 NOTE — PROGRESS NOTES
Nanette Camarillo is a 24 y.o.  at 28w3d. Reports +FM  She is doing well. Denies LOF/Bleeding/Cramping. Denies n/v/Ha, edema. Denies tobacco, drugs or ETOH use. Denies DV.     Visit Vitals  /60   Wt 114 kg (252 lb)   LMP 2023   BMI 44.64 kg/m²   OB Status Pregnant   Smoking Status Every Day   BSA 2.13 m²       Urine neg/neg      Diagnoses and all orders for this visit:    Encounter for immunization  -     Tdap Vaccine greater than or equal to 6yo    Short cervical length during pregnancy in second trimester      Breast Pump - has breast pump  TDAP - given today  Contraception - Thinking about an IUD  Peds - [facility] scan scheduled for 24    RTO in 2 weeks for PNV or sooner if needed.

## 2024-06-06 ENCOUNTER — ROUTINE PRENATAL (OUTPATIENT)
Dept: OBGYN CLINIC | Facility: CLINIC | Age: 24
End: 2024-06-06

## 2024-06-06 VITALS — SYSTOLIC BLOOD PRESSURE: 110 MMHG | DIASTOLIC BLOOD PRESSURE: 68 MMHG | BODY MASS INDEX: 44.82 KG/M2 | WEIGHT: 253 LBS

## 2024-06-06 DIAGNOSIS — Z34.83 PRENATAL CARE, SUBSEQUENT PREGNANCY, THIRD TRIMESTER: Primary | ICD-10-CM

## 2024-06-06 PROCEDURE — PNV: Performed by: NURSE PRACTITIONER

## 2024-06-06 NOTE — PROGRESS NOTES
Nanette Camarillo is a 24 y.o.  at 30w3d. Reports +FM. Intermittent cramping and ligament pain, but otherwise things are going well.  Denies LOF/Bleeding. Denies n/v.  Intermittent ankle swelling if she is on her feet too long. Is experiencing headaches very rarely were worse in the beginning, taking tylenol if needed. Denies tobacco, drugs or ETOH use. Denies DV.     Visit Vitals  /68   Wt 115 kg (253 lb)   LMP 2023   BMI 44.82 kg/m²   OB Status Pregnant   Smoking Status Every Day   BSA 2.14 m²     Pre- Vitals      Flowsheet Row Most Recent Value   Prenatal Assessment    Fetal Heart Rate 136   Fundal Height (cm) 31 cm   Movement Present   Prenatal Vitals    Blood Pressure 110/68   Weight - Scale 115 kg (253 lb)   Urine Albumin/Glucose    Dilation/Effacement/Station    Vaginal Drainage    Edema    LLE Edema None   RLE Edema None              Urine trace protein/neg glucose.      Nanette was seen today for routine prenatal visit.    Diagnoses and all orders for this visit:    Prenatal care, subsequent pregnancy, third trimester        Reminded to get 28 week labs drawn.     3rd Trimester Folder reviewed     - briefly reviewed consents, birth room rules and acknowledgement, birth plan, and Authorization for release of protected health information for photographers. Bring forms to next visit to be signed    - [facility]  -Perineal Massages  -Last week of pregnancy and when to call    RTO in 2 weeks for PNV or sooner if needed.

## 2024-06-11 ENCOUNTER — TELEPHONE (OUTPATIENT)
Age: 24
End: 2024-06-11

## 2024-06-11 LAB
ABO GROUP BLD: NORMAL
BASOPHILS # BLD AUTO: 0 X10E3/UL (ref 0–0.2)
BASOPHILS # BLD AUTO: 0 X10E3/UL (ref 0–0.2)
BASOPHILS NFR BLD AUTO: 0 %
BASOPHILS NFR BLD AUTO: 0 %
BLD GP AB SCN SERPL QL: NEGATIVE
EOSINOPHIL # BLD AUTO: 0.2 X10E3/UL (ref 0–0.4)
EOSINOPHIL # BLD AUTO: 0.2 X10E3/UL (ref 0–0.4)
EOSINOPHIL NFR BLD AUTO: 2 %
EOSINOPHIL NFR BLD AUTO: 2 %
ERYTHROCYTE [DISTWIDTH] IN BLOOD BY AUTOMATED COUNT: 13.1 % (ref 11.7–15.4)
ERYTHROCYTE [DISTWIDTH] IN BLOOD BY AUTOMATED COUNT: 13.2 % (ref 11.7–15.4)
FERRITIN SERPL-MCNC: 6 NG/ML (ref 15–150)
FOLATE SERPL-MCNC: 6.6 NG/ML
GLUCOSE 1H P 50 G GLC PO SERPL-MCNC: 222 MG/DL (ref 70–139)
HCT VFR BLD AUTO: 31.9 % (ref 34–46.6)
HCT VFR BLD AUTO: 32.4 % (ref 34–46.6)
HGB BLD-MCNC: 10.1 G/DL (ref 11.1–15.9)
HGB BLD-MCNC: 10.3 G/DL (ref 11.1–15.9)
IMM GRANULOCYTES # BLD: 0.1 X10E3/UL (ref 0–0.1)
IMM GRANULOCYTES # BLD: 0.1 X10E3/UL (ref 0–0.1)
IMM GRANULOCYTES NFR BLD: 1 %
IMM GRANULOCYTES NFR BLD: 1 %
IRON SATN MFR SERPL: 7 % (ref 15–55)
IRON SERPL-MCNC: 34 UG/DL (ref 27–159)
LYMPHOCYTES # BLD AUTO: 1.7 X10E3/UL (ref 0.7–3.1)
LYMPHOCYTES # BLD AUTO: 1.7 X10E3/UL (ref 0.7–3.1)
LYMPHOCYTES NFR BLD AUTO: 18 %
LYMPHOCYTES NFR BLD AUTO: 18 %
MCH RBC QN AUTO: 27.7 PG (ref 26.6–33)
MCH RBC QN AUTO: 28.1 PG (ref 26.6–33)
MCHC RBC AUTO-ENTMCNC: 31.7 G/DL (ref 31.5–35.7)
MCHC RBC AUTO-ENTMCNC: 31.8 G/DL (ref 31.5–35.7)
MCV RBC AUTO: 88 FL (ref 79–97)
MCV RBC AUTO: 88 FL (ref 79–97)
MONOCYTES # BLD AUTO: 0.3 X10E3/UL (ref 0.1–0.9)
MONOCYTES # BLD AUTO: 0.3 X10E3/UL (ref 0.1–0.9)
MONOCYTES NFR BLD AUTO: 3 %
MONOCYTES NFR BLD AUTO: 3 %
NEUTROPHILS # BLD AUTO: 7.1 X10E3/UL (ref 1.4–7)
NEUTROPHILS # BLD AUTO: 7.1 X10E3/UL (ref 1.4–7)
NEUTROPHILS NFR BLD AUTO: 76 %
NEUTROPHILS NFR BLD AUTO: 76 %
PLATELET # BLD AUTO: 211 X10E3/UL (ref 150–450)
PLATELET # BLD AUTO: 218 X10E3/UL (ref 150–450)
RBC # BLD AUTO: 3.64 X10E6/UL (ref 3.77–5.28)
RBC # BLD AUTO: 3.67 X10E6/UL (ref 3.77–5.28)
RETICS/RBC NFR AUTO: 2.1 % (ref 0.6–2.6)
RH BLD: POSITIVE
RPR SER QL: NON REACTIVE
TIBC SERPL-MCNC: 477 UG/DL (ref 250–450)
UIBC SERPL-MCNC: 443 UG/DL (ref 131–425)
VIT B12 SERPL-MCNC: 313 PG/ML (ref 232–1245)
WBC # BLD AUTO: 9.4 X10E3/UL (ref 3.4–10.8)
WBC # BLD AUTO: 9.4 X10E3/UL (ref 3.4–10.8)

## 2024-06-11 NOTE — TELEPHONE ENCOUNTER
Pt called to review recent lab results. Did communicate labs have not yet been interpreted by MD, and that pt will receive communication once labs are interpreted. Pt is asking any communication be sent through [employer], as she contreras snot currently have a working phone.

## 2024-06-12 ENCOUNTER — TELEPHONE (OUTPATIENT)
Dept: OBGYN CLINIC | Facility: CLINIC | Age: 24
End: 2024-06-12

## 2024-06-12 DIAGNOSIS — O24.419 GESTATIONAL DIABETES MELLITUS (GDM) IN THIRD TRIMESTER, GESTATIONAL DIABETES METHOD OF CONTROL UNSPECIFIED: Primary | ICD-10-CM

## 2024-06-12 DIAGNOSIS — D50.9 MATERNAL IRON DEFICIENCY ANEMIA AFFECTING PREGNANCY IN THIRD TRIMESTER, ANTEPARTUM: Primary | ICD-10-CM

## 2024-06-12 DIAGNOSIS — O99.013 MATERNAL IRON DEFICIENCY ANEMIA AFFECTING PREGNANCY IN THIRD TRIMESTER, ANTEPARTUM: Primary | ICD-10-CM

## 2024-06-12 RX ORDER — SODIUM CHLORIDE 9 MG/ML
20 INJECTION, SOLUTION INTRAVENOUS ONCE
OUTPATIENT
Start: 2024-06-25

## 2024-06-12 NOTE — TELEPHONE ENCOUNTER
Patient called back. I scheduled her GDM classes but she does want to speak to Madelin Osuna about the results. She gave me an alternate phone number to call her back on  Phone # [phone]

## 2024-06-14 ENCOUNTER — TELEPHONE (OUTPATIENT)
Facility: HOSPITAL | Age: 24
End: 2024-06-14

## 2024-06-14 NOTE — TELEPHONE ENCOUNTER
Spoke with pt and states she is already set up with MFM for diabetic education and management.  Just need to call infusion center on Monday and set up her first infusion appointment.  Will need to coordinate Lyft for her as she has no transportation right now.

## 2024-06-14 NOTE — TELEPHONE ENCOUNTER
Called [employer] spoke with Natalie @ # [phone] to set up LYFT transportation for patient's Maternal Fetal Medicine appointment.  Appointment scheduled on  June 17, 2024 at 11:30 AM at [facility].       Tried calling patient to inform her of the scheduled  and instructions. Patient has calling restrictions that would not allow the phone call to go through.     Phone # and address given to Eduardo  [phone]  [address] [place] [phone]

## 2024-06-14 NOTE — TELEPHONE ENCOUNTER
Tried calling pt on the number she provided and said voicemail is not set up.  Sent her a [employer]t message to see if she spoke to someone in regards to her recent results.

## 2024-06-15 NOTE — PATIENT INSTRUCTIONS
Thank you for choosing us for your  care today.  If you have any questions about your ultrasound or care, please do not hesitate to contact us or your primary obstetrician.      Some general instructions for your pregnancy are:    Exercise: Aim for 22 minutes per day (150 minutes per week) of regular exercise.  Walking is great!  Nutrition: aim for calcium-rich and iron-rich foods as well as healthy sources of protein.    Learn about Preeclampsia: preeclampsia is a common, serious high blood pressure complication in pregnancy.  A blood pressure of 140mmHg (systolic or top number) or 90mmHg (diastolic or bottom number) is not normal and needs evaluation by your doctor.  Aspirin is sometimes prescribed in early pregnancy to prevent preeclampsia in women with risk factors - ask your obstetrician if you should be on this medication.  If you smoke, try to reduce how many cigarettes you smoke or try to quit completely.  Do not vape.    Other warning signs to watch out for in pregnancy or postpartum: chest pain, obstructed breathing or shortness of breath, seizures, thoughts of hurting yourself or your baby, bleeding, a painful or swollen leg, fever, or headache (see [facility] POST-BIRTH Warning Signs campaign).  If these happen call 911.  Itching is also not normal in pregnancy and if you experience this, especially over your hands and feet, potentially worse at night, notify your doctors.

## 2024-06-17 ENCOUNTER — ULTRASOUND (OUTPATIENT)
Facility: HOSPITAL | Age: 24
End: 2024-06-17
Payer: COMMERCIAL

## 2024-06-17 ENCOUNTER — TELEPHONE (OUTPATIENT)
Dept: OBGYN CLINIC | Facility: CLINIC | Age: 24
End: 2024-06-17

## 2024-06-17 VITALS
SYSTOLIC BLOOD PRESSURE: 118 MMHG | BODY MASS INDEX: 44.97 KG/M2 | HEART RATE: 102 BPM | WEIGHT: 253.8 LBS | HEIGHT: 63 IN | DIASTOLIC BLOOD PRESSURE: 60 MMHG

## 2024-06-17 DIAGNOSIS — Z3A.31 31 WEEKS GESTATION OF PREGNANCY: ICD-10-CM

## 2024-06-17 DIAGNOSIS — O99.212 OBESITY AFFECTING PREGNANCY IN SECOND TRIMESTER, UNSPECIFIED OBESITY TYPE: Primary | ICD-10-CM

## 2024-06-17 DIAGNOSIS — O24.419 GESTATIONAL DIABETES MELLITUS (GDM) IN THIRD TRIMESTER, GESTATIONAL DIABETES METHOD OF CONTROL UNSPECIFIED: ICD-10-CM

## 2024-06-17 DIAGNOSIS — O26.873 SHORT CERVICAL LENGTH DURING PREGNANCY IN THIRD TRIMESTER: ICD-10-CM

## 2024-06-17 PROCEDURE — 76816 OB US FOLLOW-UP PER FETUS: CPT | Performed by: OBSTETRICS & GYNECOLOGY

## 2024-06-17 PROCEDURE — 99213 OFFICE O/P EST LOW 20 MIN: CPT | Performed by: OBSTETRICS & GYNECOLOGY

## 2024-06-17 NOTE — TELEPHONE ENCOUNTER
Call cannot be completed at this time, d/t calling restrictions, per automated message when attempting to place call to patient. [employer] message sent.

## 2024-06-17 NOTE — TELEPHONE ENCOUNTER
Called infusion center and spoke to Arturo to set up pts infusions.  As per Arturo pt has been set up and will be getting Lyft set up for her.

## 2024-06-17 NOTE — PROGRESS NOTES
"[facility]: Nanette Camarillo was seen today for fetal growth assessment ultrasound.  See ultrasound report under \"OB Procedures\" tab.   The time spent on this established patient on the encounter date included 5 minutes previsit service time reviewing records and precharting, 10 minutes face-to-face service time counseling regarding results and coordinating care, and  5 minutes charting, totalling 20 minutes.  Please don't hesitate to contact our office with any concerns or questions.  -Zahra Carr MD      "

## 2024-06-17 NOTE — TELEPHONE ENCOUNTER
Placed call to [facility] infusion center [phone], spoke to Arturo to assist in scheduling patient's initial venofer infusion. Scheduled for next available, Tuesday 6/25/24 @ 1230. Will call to arrange LYFT for initial infusion appt, made Arturo aware of transportation assistance needed and infusion center will arrange for future transport needs. Scheduled remainder of infusions as follows:  7/2/24 @ 1130  7/9/24 @ 1230  7/16/24 @ 1300 (1pm)  7/23/24 @ 1030  Will attempt to call patient to review.

## 2024-06-20 ENCOUNTER — TELEPHONE (OUTPATIENT)
Dept: OBGYN CLINIC | Facility: CLINIC | Age: 24
End: 2024-06-20

## 2024-06-20 ENCOUNTER — TELEPHONE (OUTPATIENT)
Facility: HOSPITAL | Age: 24
End: 2024-06-20

## 2024-06-20 ENCOUNTER — ROUTINE PRENATAL (OUTPATIENT)
Dept: OBGYN CLINIC | Facility: CLINIC | Age: 24
End: 2024-06-20

## 2024-06-20 VITALS — WEIGHT: 253 LBS | SYSTOLIC BLOOD PRESSURE: 110 MMHG | DIASTOLIC BLOOD PRESSURE: 70 MMHG | BODY MASS INDEX: 44.82 KG/M2

## 2024-06-20 DIAGNOSIS — Z34.83 PRENATAL CARE, SUBSEQUENT PREGNANCY, THIRD TRIMESTER: Primary | ICD-10-CM

## 2024-06-20 PROCEDURE — PNV: Performed by: NURSE PRACTITIONER

## 2024-06-20 NOTE — PROGRESS NOTES
Nanette Camarillo is a 24 y.o.  at 32w3d. Reports +FM  She is doing well. Denies LOF/Bleeding. + Cramping. Denies n/v/Ha, edema. Denies tobacco, drugs or ETOH use. Denies DV.     Visit Vitals  /70   Wt 115 kg (253 lb)   LMP 2023   BMI 44.82 kg/m²   OB Status Pregnant   Smoking Status Every Day   BSA 2.14 m²     Pre- Vitals      Flowsheet Row Most Recent Value   Prenatal Assessment    Fetal Heart Rate 151   Fundal Height (cm) 34 cm   Movement Present   Prenatal Vitals    Blood Pressure 110/70   Weight - Scale 115 kg (253 lb)   Urine Albumin/Glucose    Dilation/Effacement/Station    Vaginal Drainage    Edema             Urine neg/neg    Diagnoses and all orders for this visit:    Prenatal care, subsequent pregnancy, third trimester      NST scheduled for tomorrow.    Continue FK.    S/S  labor    RTO in 2 weeks for PNV or sooner if needed.

## 2024-06-20 NOTE — TELEPHONE ENCOUNTER
Bedside shift change report given to this RN (oncoming nurse) by Guillermo Ortiz (offgoing nurse). Report included the following information SBAR and Kard[employer]. Placed call to [facility] at #[phone], spoke to Natalie and scheduled  on Tuesday 6/25/24 @ 11:50 at pt's confirmed home address on file, to transport patient to scheduled Infusion at [facility], main entrance.   [employer] message sent to patient to confirm transportation arrangements confirmed and to notify of p/u details.

## 2024-06-20 NOTE — TELEPHONE ENCOUNTER
Called [facility] [employer] spoke with Natalie @ # [phone] to set up LYFT transportation for patient's Maternal Fetal Medicine appointment.  Appointment scheduled on  June 21, 2024 at 1:00 PM at [facility].     Phone call could not be completed with number on file. Sent [employer] message to confirm LYFT details w/PT and phone number.     Confirmed phone and address.   [phone]  [address] [place] [id number]

## 2024-06-21 ENCOUNTER — TELEPHONE (OUTPATIENT)
Age: 24
End: 2024-06-21

## 2024-06-21 ENCOUNTER — ROUTINE PRENATAL (OUTPATIENT)
Facility: HOSPITAL | Age: 24
End: 2024-06-21
Payer: COMMERCIAL

## 2024-06-21 ENCOUNTER — TELEPHONE (OUTPATIENT)
Dept: INFUSION CENTER | Facility: HOSPITAL | Age: 24
End: 2024-06-21

## 2024-06-21 ENCOUNTER — TELEPHONE (OUTPATIENT)
Facility: HOSPITAL | Age: 24
End: 2024-06-21

## 2024-06-21 VITALS
HEIGHT: 63 IN | HEART RATE: 94 BPM | WEIGHT: 252 LBS | BODY MASS INDEX: 44.65 KG/M2 | SYSTOLIC BLOOD PRESSURE: 114 MMHG | DIASTOLIC BLOOD PRESSURE: 64 MMHG

## 2024-06-21 DIAGNOSIS — Z3A.32 32 WEEKS GESTATION OF PREGNANCY: Primary | ICD-10-CM

## 2024-06-21 DIAGNOSIS — O24.419 GESTATIONAL DIABETES MELLITUS (GDM) IN THIRD TRIMESTER, GESTATIONAL DIABETES METHOD OF CONTROL UNSPECIFIED: ICD-10-CM

## 2024-06-21 PROCEDURE — 59025 FETAL NON-STRESS TEST: CPT | Performed by: OBSTETRICS & GYNECOLOGY

## 2024-06-21 NOTE — TELEPHONE ENCOUNTER
"Attempted to call pt for new pt reminder. Phone number in chart invalid saying \"call is not reachable\".   "

## 2024-06-21 NOTE — TELEPHONE ENCOUNTER
FYI PT called at 12:30 pm. LYFT no show.  Attempted warm transfer.  Patient disconnected.  Pt states phone on file does not work.  Would not give an alternate

## 2024-06-21 NOTE — PROGRESS NOTES
Non-Stress Testing:    Non-Stress test, equipment, procedure, and expected outcomes explained. Reviewed fetal kick counts and when to call OB.Verified patient understanding of fetal kick counts with teach back method. Patient reports feeling daily fetal movements. Patient has no questions or concerns.       Patient verbalizes +FM. Pt denies ALL:               Leaking of fluid   Contractions   Vaginal bleeding   Decreased fetal movement  Nonstress test reviewed by DR Ding prior to completion of appointment.

## 2024-06-21 NOTE — LETTER
NST sleeve cover sheet    Patient name: Nanette Camarillo  : 2000  MRN: [id number]    JAIME: Estimated Date of Delivery: 24    Obstetrician: [facility]    Reason(s) for testing: BMI >44,     Testing frequency:    ___  2x/wk  ___  1x/wk  ___  Dopplers  ___  BPP?      Last growth scan: __________, _________, _________    Baby:      Male   /   Female   /   [place]             Baby Name: ___________________            IOL or  C/S: _____________________

## 2024-06-21 NOTE — TELEPHONE ENCOUNTER
Called [facility] [employer] spoke with Carter @ # [phone] to set up LYFT transportation for patient's Maternal Fetal Medicine appointment.  Appointment scheduled on  June 24, 2024 at 1:00 PM at our [place].       Spoke with patient and explained LYFT will pick-up patient at 12:15 PM for Maternal Fetal Medicine appointment.   Patient instructed she has 5 minutes maximum to get into car upon arrival. Patient verbalized understanding.     Confirmed phone and address.   [phone]  [address] [place] [id number]

## 2024-06-21 NOTE — TELEPHONE ENCOUNTER
Called to follow up with Eduardo about the no show. Per Carter patient is due to be dropped off in approximately 3 minutes.

## 2024-06-24 ENCOUNTER — TELEMEDICINE (OUTPATIENT)
Dept: PERINATAL CARE | Facility: CLINIC | Age: 24
End: 2024-06-24
Payer: COMMERCIAL

## 2024-06-24 ENCOUNTER — APPOINTMENT (OUTPATIENT)
Dept: PERINATAL CARE | Facility: CLINIC | Age: 24
End: 2024-06-24
Payer: COMMERCIAL

## 2024-06-24 ENCOUNTER — ROUTINE PRENATAL (OUTPATIENT)
Facility: HOSPITAL | Age: 24
End: 2024-06-24
Payer: COMMERCIAL

## 2024-06-24 VITALS — HEIGHT: 63 IN | HEART RATE: 89 BPM | WEIGHT: 251.6 LBS | BODY MASS INDEX: 44.58 KG/M2

## 2024-06-24 DIAGNOSIS — O99.213 SEVERE OBESITY DUE TO EXCESS CALORIES AFFECTING PREGNANCY IN THIRD TRIMESTER (HCC): Primary | ICD-10-CM

## 2024-06-24 DIAGNOSIS — O24.419 GESTATIONAL DIABETES MELLITUS (GDM) IN THIRD TRIMESTER, GESTATIONAL DIABETES METHOD OF CONTROL UNSPECIFIED: Primary | ICD-10-CM

## 2024-06-24 DIAGNOSIS — Z3A.33 33 WEEKS GESTATION OF PREGNANCY: ICD-10-CM

## 2024-06-24 DIAGNOSIS — O99.213 OBESITY AFFECTING PREGNANCY IN THIRD TRIMESTER, UNSPECIFIED OBESITY TYPE: ICD-10-CM

## 2024-06-24 DIAGNOSIS — E66.01 SEVERE OBESITY DUE TO EXCESS CALORIES AFFECTING PREGNANCY IN THIRD TRIMESTER (HCC): Primary | ICD-10-CM

## 2024-06-24 DIAGNOSIS — O24.419 GESTATIONAL DIABETES MELLITUS (GDM) IN THIRD TRIMESTER, GESTATIONAL DIABETES METHOD OF CONTROL UNSPECIFIED: ICD-10-CM

## 2024-06-24 PROCEDURE — 59025 FETAL NON-STRESS TEST: CPT | Performed by: OBSTETRICS & GYNECOLOGY

## 2024-06-24 PROCEDURE — G0109 DIAB MANAGE TRN IND/GROUP: HCPCS

## 2024-06-24 RX ORDER — BLOOD-GLUCOSE METER
EACH MISCELLANEOUS
Qty: 1 KIT | Refills: 0 | Status: SHIPPED | OUTPATIENT
Start: 2024-06-24 | End: 2024-06-25

## 2024-06-24 RX ORDER — BLOOD SUGAR DIAGNOSTIC
STRIP MISCELLANEOUS
Qty: 100 STRIP | Refills: 4 | Status: SHIPPED | OUTPATIENT
Start: 2024-06-24 | End: 2024-06-25

## 2024-06-24 RX ORDER — LANCETS 33 GAUGE
EACH MISCELLANEOUS
Qty: 100 EACH | Refills: 4 | Status: SHIPPED | OUTPATIENT
Start: 2024-06-24 | End: 2024-08-12

## 2024-06-24 NOTE — PROGRESS NOTES
CLASS 1 - Group  (virtual visit)    Thank you for referring your patient to [facility] Maternal Fetal Medicine Diabetes and Pregnancy Program.     Subjective:     Nanette Camarillo is a 24 y.o. female who presents today unaccompanied for Virtual Regular Visit, Patient Education, Gestational Diabetes, and Virtual Regular Visit. Patient is at 33w0d gestation, Estimated Date of Delivery: 24.     Reviewed and updated the following from patients medical record: Demographics, Education, Occupation, PMH, Problem List, Allergies, and Current Medications. D&P Assessment responses can be found in completed self-assessment questionnaire.    Visit Diagnosis:  Encounter Diagnosis     ICD-10-CM    1. Gestational diabetes mellitus (GDM) in third trimester, gestational diabetes method of control unspecified  O24.419 Mychart glucose flowsheet      2. 33 weeks gestation of pregnancy  Z3A.33 Mychart glucose flowsheet      3. Obesity affecting pregnancy in third trimester, unspecified obesity type  O99.213 Mychart glucose flowsheet           Discussed with patient:  Pathophysiology of Gestational diabetes mellitus (GDM) in third trimester, gestational diabetes method of control unspecified [O24.419].  Untreated hyperglycemia in pregnancy and maternal fetal complications (fetal macrosomia,  hypoglycemia, polyhydramnios, increased incidence of  section,  labor, and in severe cases fetal demise and still birth).   Importance of blood glucose monitoring, nutrition, and medication if necessary in achieving BG goals.     Labs  GDM LABS:  1 Hour GTT:   Lab Results   Component Value Date/Time    [id number] 222 (H) 06/10/2024 11:46 AM      3 Hour GTT:   Lab Results   Component Value Date/Time    GLUF 83 2024 12:00 PM    MDPQBSI3BN 68 2019 09:44 AM      A1C:  Lab Results   Component Value Date/Time    HGBA1C 5.5 2024 12:00 PM      Labs Ordered This Visit: None    Current Outpatient  "Medications    Current Outpatient Medications:     acetaminophen (TYLENOL) 500 mg tablet, Take 500 mg by mouth every 6 (six) hours as needed for mild pain (Patient not taking: Reported on 6/17/2024), Disp: , Rfl:     Prenatal Vit-Fe Fumarate-FA (Prenatal Plus Vitamin/Mineral) 27-1 MG TABS, Take 1 tablet by mouth in the morning, Disp: 90 tablet, Rfl: 3    Progesterone 100 MG CAPS, Insert 200 mg into the vagina daily at bedtime (Patient not taking: Reported on 6/6/2024), Disp: 14 capsule, Rfl: 6     Anthropometrics:  Ht Readings from Last 1 Encounters:   06/24/24 5' 3\" (1.6 m)      Wt Readings from Last 3 Encounters:   06/24/24 114 kg (251 lb 9.6 oz)   06/21/24 114 kg (252 lb)   06/20/24 115 kg (253 lb)        Pre-Gravid Wt Pre-Gravid BMI TWG   104 kg (230 lb) 40.75 9.798 kg (21 lb 9.6 oz)     Total Pregnancy Weight Gain Recommendations: BMI (> 30) 11-20 lbs  Current Wt Status Compared to Recommendations: Exceeding -- Recommended to maintain wt for remainder of pregnancy    Most Recent Ultrasound Results:  Findings: NML Growth/AMERICO  Further Fetal Surveillance: None  Next US date: Scheduled Appropriately    Blood Glucose Monitoring:     Glucose Meter: OneTouch Verio Flex   *orders for supplies (meter, lancets, strips) based on patients needs pended/routed to appropriate provider after today's visit for review/approval     Instructed on Testing Blood Sugars: 4 x per day (Fasting, 2 hour after start of each meal)  Goals: (Fasting) 60-95mg/dL // (2hr PP) <120mg/dL  Meter Teaching: Gave instruction on site selection, skin preparation, loading strips and lancet device, meter activation, obtaining blood sample, test strip and lancet disposal and storage, and recording log book entries.   Blood Sugar Tested in Office? No (Virtual Visit)  Instructed to report blood sugar results weekly via Phone: [phone] OR [employer] (Message with image attachment, or Glucose Flowsheet)    Meal Plan: Patient was provided with a meal plan " "including 3 meals and 3 snacks  *Calories: 2000 calorie (CHO: 45-15-60-15-60-30) (PRO: 2/3-1-3/4-1-3/4-2)    Meal Plan Tips Reviewed at Today's Visit:  Appropriate amounts of CHO, PRO, and Fat at each meal and snack.   CHO exchange list, and portion sizes for both CHO and PRO via food models  How to read a food label  Suggested meal/snack options to increase nutrition and maintain consistent meal and snack intakes  Eat every 2.0-3.5 hours while awake  Go no longer than 8-10 hours fasting overnight until first meal of the day.     Physical Activity:    Reviewed w/ Pt:   Benefits of physical activity to optimize blood glucose control, encouraged activity at patient is physically able.   Instructed pt to always consult a physician prior to starting an exercise program.   Recommend 20-30 minutes daily.     Patient Stated Goal: \"I will walk 20-30 minutes after dinner daily\"    Expected Compliance: good  Barriers to Learning/Change: No Barriers  Diabetes Self Management Support Plan (outside of ongoing care): Spouse/Family     Date to Report Blood Sugars: Day Before Class 2, Then Weekly (till delivery)    Class 2: Return in 1 week (on 7/1/2024) for Class 2 w/ Gita Mack RD.   *Patient instructed to bring 3 day food diary and/or write down foods associated with elevated after meal blood sugars    Begin Time: 2:30pm    End Time: 3:30pm    It was a pleasure working with them today. Please feel free to call ([phone]) with any questions or concerns.    Gita Mack RD   Diabetes Educator  [facility] Maternal Fetal Medicine  Diabetes and Pregnancy Program  [address], [address]  [place] [address]    Virtual Regular Visit    Verification of patient location:    Patient is located at Home in the following state in which I hold an active license [place]      Assessment/Plan:    Problem List Items Addressed This Visit       Gestational diabetes mellitus (GDM) in third trimester - Primary    Relevant Orders    Mychart " glucose flowsheet    Obesity affecting pregnancy in second trimester     Other Visit Diagnoses       33 weeks gestation of pregnancy        Relevant Orders    Mychart glucose flowsheet                 Reason for visit is   Chief Complaint   Patient presents with    Virtual Regular Visit    Patient Education    Gestational Diabetes    Virtual Regular Visit          Encounter provider Gita Mack RD      Recent Visits  No visits were found meeting these conditions.  Showing recent visits within past 7 days and meeting all other requirements  Today's Visits  Date Type Provider Dept   24 Telemedicine Gita Mack RD [facility]   Showing today's visits and meeting all other requirements  Future Appointments  No visits were found meeting these conditions.  Showing future appointments within next 150 days and meeting all other requirements       The patient was identified by name and date of birth. Nanette Camarillo was informed that this is a telemedicine visit and that the visit is being conducted through the [employer] platform. She agrees to proceed..  My office door was closed. No one else was in the room.  She acknowledged consent and understanding of privacy and security of the video platform. The patient has agreed to participate and understands they can discontinue the visit at any time.    Patient is aware this is a billable service.     Subjective  Nanette Camarillo is a 24 y.o. female pregnant.      HPI     Past Medical History:   Diagnosis Date    Allergic     Morphine    Anxiety     previously on meds Buspar    Arthritis     Possible Arthritis in knee    Asthma     Depression     History of anxiety     Low grade squamous intraepithelial lesion on cytologic smear of cervix (LGSIL)     Formatting of this note might be different from the original.   Patient to have Co testing in 1 year    Maternal iron deficiency anemia affecting pregnancy in third trimester, antepartum 2024    Obesity      Psychiatric disorder     depression, anxiety       Past Surgical History:   Procedure Laterality Date     SECTION      NE TX MISSED  FIRST TRIMESTER SURGICAL N/A 2023    Procedure: DILATATION AND EVACUATION (D&E) 17 WEEKS;  Surgeon: Serenity Calvo MD;  Location: [facility] Main OR;  Service: Gynecology       Current Outpatient Medications   Medication Sig Dispense Refill    acetaminophen (TYLENOL) 500 mg tablet Take 500 mg by mouth every 6 (six) hours as needed for mild pain (Patient not taking: Reported on 2024)      Prenatal Vit-Fe Fumarate-FA (Prenatal Plus Vitamin/Mineral) 27-1 MG TABS Take 1 tablet by mouth in the morning 90 tablet 3    Progesterone 100 MG CAPS Insert 200 mg into the vagina daily at bedtime (Patient not taking: Reported on 2024) 14 capsule 6     No current facility-administered medications for this visit.        Allergies   Allergen Reactions    Morphine Itching    Wound Dressing Adhesive Blisters     Surgical tape       Review of Systems    Video Exam    There were no vitals filed for this visit.    Physical Exam     Visit Time  Total Visit Duration: 60min

## 2024-06-24 NOTE — PATIENT INSTRUCTIONS
CLASS 1  Diabetes and Pregnancy Program   [facility] - Maternal Fetal Medicine    Diabetes Team:   LEONIDES Garcia CDE   Debo Griffin RD CD (Judy)E MS Pelayo (Marlin) YAN Mack MS, TRICE Wolfe RD CDE MPH    Resource: Gestational Diabetes  ACOG     CHECKING BLOOD SUGARS    Always carry your meter and testing supplies with you at all times. Bring your glucose meter to all your appointments in our [facility] office.     Prescription for Meter/Strips/Lancets:   A request for a glucose meter, test strips and lancets was sent to the provider for approval.   Once your prescriptions are approved by the provider, your prescription will be sent electronically to your pharmacy.   Be mindful the provider is seeing patients in the office today so allow ample time for your prescriptions to be approved.   Call your pharmacy to verify your medication was received electronically and is ready for pick-up before going to pharmacy. If you have any issues with coverage or your meter is unavailable, please reach out to our office at [phone].     Coupons/Savings:   One Touch Verio: RX Finder  Automatic Savings for Diabetes Supplies  OneTouch®   Contour Next: Contour® NEXT Test Strips Discounts  [facility]     How to Check Blood Sugars:  Wash your hands with soap and water or use waterless  to clean your hands.  Alcohol can dry your fingers and is not recommended.  Alcohol can also cause false, elevated glucose readings.  AVOID scented soaps and hand sanitizers - scented soaps may include sugar which can cause inaccurate/elevated readings   Gather your glucose meter kit and supplies.  Prepare your meter by inserting a new test strip.   This will automatically turn on your meter  Make sure test strips are not .  Use a new test strip each time.   Prepare your lancing device by inserting the lancet               After the lancet is securely in place, twist off  "the top to reveal needle.   Reattach lancing device top   Once lancing device top is reattached, you are now ready to prick the side of your fingertip to get a blood sample.  You can adjust the depth setting as needed. We recommend to start at \"4\".   Apply the blood sample to test strip according to instructions.   Make sure your sharp container is: heavy duty plastic, puncture-resistant lid, upright and stable, leak resistant, properly labeled.   Record your blood sugar     Additional References and Video:   One Touch Verio: OneTouch Verio Flex®  Blood Glucose Meter  OneTouch®   Contour Next: Instructional Videos - [facility]     Frequency: 4 Times Daily     Timing:   Fasting   Test when you wake up before you have anything to eat or drink  At least 8hrs but no more than 10hrs from your bedtime snack  Exceeding 10hrs may result in inaccurate readings  2 hrs after the first bite of your meal (breakfast, lunch, dinner) **do not test for snacks    Goals:    Fasting  60-95   1 Hour   After Meals <140   2 Hours   After Meals <120   *please inform member of diabetes team if you begin testing 1hr blood sugars    REPORTING BLOOD SUGARS:   Please only pick ONE way to report to our team. Choose the best option for you.     [employer] Blood Glucose Flow sheet under \"Track my Health\"   Remember to click \"save\" for your readings to automatically upload into Epic.   [employer] Message with Attachment(s)  Send a picture of a written blood sugar log   To: LEONIDES Garcia (Medical Question - Non Urgent)  You may include up to 3 images per message  Leave Voicemail at [phone]   Include: Name, Date of Birth, and Date + Blood Sugars    The diabetes team will review your blood sugar log weekly till delivery.             MEAL PLAN AND DIET INSTRUCTIONS    *Carbohydrates: Sources of food that increase your blood sugar (include: starches, fruits, milk, desserts, starchy vegetables such as corn, peas, squash)    Meal Plan " (3 Meals and 3 Snacks)  Outlines grams of carbohydrates to have at each meal and snack  Minimum Carbohydrate Intake Daily (avoid ketosis): 175g *to be distributed throughout day as instructed in meal plan  Include Protein at Every Meal and Snack  Eat all 3 meals and 3 snacks  Eating every 2 to 3.5 hours during the day.   Preferably at the same times every day.   Avoid going long periods of time without eating.        Be sure to have bedtime snack that includes at least 30 grams of carbohydrates and 2 ounces (14 grams) of protein.  Refer to Food Lists in Booklet (pages 15-17)   Each food listed is equal to 15g (1 Carbohydrate Serving)  Read Food Label   Check Total Carbohydrate  15g = 1 Carbohydrate Serving  Check Serving Size!   The total carbohydrate amount listed is based on 1 serving size  Be careful as many items contain more than one serving per package  Check Total Sugars  Choose items with <15g per serving of total sugar    Exercise:  If ok by your OB try adding a 20-30 minute walk after dinner to help keep fasting blood sugar within range.  Try incorporating at least 10 minutes of walking after each meal  Resource: Exercise During Pregnancy  ACOG     Additional Information: (to be reviewed at class 2)  Continue to follow up with your OB and MFM providers as recommended.  Always have glucose available for hypoglycemia, use 15 by 15 rule. (Page 29 in booklet)  While sick or feeling ill, follow our sick day guidelines in our GDM booklet. (Page 28 in booklet)  For travel and dining out tips refer to your GDM booklet. See attachment (Page 31 in booklet)    Class 2 Information: Approximately 1 week following Class 1  Bring 3 Day Food Log (everything you eat and drink for each meal and snack) or write down meals associated with elevated after meal blood sugars  Will review diet more in depth and provide feedback     Remember: Importance of maintaining tight control of blood sugars during pregnancy = decrease risk  factors including fetal macrosomia; birth injury; risk of ; polyhydramnios; pre-term labor; pre-eclampsia;  hypoglycemia; jaundice and stillbirth.    Any questions give us a call at [phone] or send us a MyChart message to LEONIDES Garcia.     Gita Mack RD  Diabetes Educator   The Diabetes and Pregnancy Program  At [facility] - Maternal Fetal Medicine

## 2024-06-24 NOTE — PROGRESS NOTES
Repeat Non-Stress Testing:    Patient verbalizes +FM. Pt denies ALL:               Leaking of fluid   Contractions   Vaginal bleeding   Decreased fetal movement    Patient is performing daily kick counts. Patient has no questions or concerns.   NST strip reviewed by Dr. Monroy.

## 2024-06-25 ENCOUNTER — TELEPHONE (OUTPATIENT)
Age: 24
End: 2024-06-25

## 2024-06-25 ENCOUNTER — HOSPITAL ENCOUNTER (OUTPATIENT)
Dept: INFUSION CENTER | Facility: HOSPITAL | Age: 24
Discharge: HOME | End: 2024-06-25
Attending: STUDENT IN AN ORGANIZED HEALTH CARE EDUCATION/TRAINING PROGRAM
Payer: COMMERCIAL

## 2024-06-25 VITALS
OXYGEN SATURATION: 98 % | HEART RATE: 97 BPM | TEMPERATURE: 97.8 F | DIASTOLIC BLOOD PRESSURE: 57 MMHG | SYSTOLIC BLOOD PRESSURE: 119 MMHG | RESPIRATION RATE: 18 BRPM

## 2024-06-25 DIAGNOSIS — D50.9 MATERNAL IRON DEFICIENCY ANEMIA AFFECTING PREGNANCY IN THIRD TRIMESTER, ANTEPARTUM: Primary | ICD-10-CM

## 2024-06-25 DIAGNOSIS — O99.013 MATERNAL IRON DEFICIENCY ANEMIA AFFECTING PREGNANCY IN THIRD TRIMESTER, ANTEPARTUM: Primary | ICD-10-CM

## 2024-06-25 PROBLEM — O99.213 SEVERE OBESITY DUE TO EXCESS CALORIES AFFECTING PREGNANCY IN THIRD TRIMESTER (HCC): Status: ACTIVE | Noted: 2024-03-29

## 2024-06-25 PROBLEM — E66.01 SEVERE OBESITY DUE TO EXCESS CALORIES AFFECTING PREGNANCY IN THIRD TRIMESTER (HCC): Status: ACTIVE | Noted: 2024-03-29

## 2024-06-25 RX ORDER — BLOOD-GLUCOSE METER
EACH MISCELLANEOUS
Qty: 1 KIT | Refills: 0 | Status: SHIPPED | OUTPATIENT
Start: 2024-06-25 | End: 2024-08-12

## 2024-06-25 RX ORDER — SODIUM CHLORIDE 9 MG/ML
20 INJECTION, SOLUTION INTRAVENOUS ONCE
OUTPATIENT
Start: 2024-07-02

## 2024-06-25 RX ORDER — BLOOD SUGAR DIAGNOSTIC
STRIP MISCELLANEOUS
Qty: 100 EACH | Refills: 4 | Status: SHIPPED | OUTPATIENT
Start: 2024-06-25 | End: 2024-08-12

## 2024-06-25 RX ORDER — SODIUM CHLORIDE 9 MG/ML
20 INJECTION, SOLUTION INTRAVENOUS ONCE
Status: COMPLETED | OUTPATIENT
Start: 2024-06-25 | End: 2024-06-25

## 2024-06-25 RX ADMIN — SODIUM CHLORIDE 20 ML/HR: 9 INJECTION, SOLUTION INTRAVENOUS at 12:17

## 2024-06-25 RX ADMIN — IRON SUCROSE 200 MG: 20 INJECTION, SOLUTION INTRAVENOUS at 12:17

## 2024-06-25 NOTE — PROGRESS NOTES
Nanette Camarillo  tolerated treatment well with no complications.      Nanette Camarillo is aware of future appt on 7/2/24 at 1130.     AVS printed and given to Nanette Camarillo:    No (Declined by Nanette Camarillo)

## 2024-06-25 NOTE — PROGRESS NOTES
Please refer to the [facility] ultrasound report in Ob Procedures for additional information regarding today's visit

## 2024-06-26 ENCOUNTER — ULTRASOUND (OUTPATIENT)
Facility: HOSPITAL | Age: 24
End: 2024-06-26
Payer: COMMERCIAL

## 2024-06-26 ENCOUNTER — TELEPHONE (OUTPATIENT)
Facility: HOSPITAL | Age: 24
End: 2024-06-26

## 2024-06-26 VITALS
BODY MASS INDEX: 44.44 KG/M2 | HEIGHT: 63 IN | HEART RATE: 95 BPM | DIASTOLIC BLOOD PRESSURE: 62 MMHG | WEIGHT: 250.8 LBS | SYSTOLIC BLOOD PRESSURE: 110 MMHG

## 2024-06-26 DIAGNOSIS — E66.01 SEVERE OBESITY DUE TO EXCESS CALORIES AFFECTING PREGNANCY IN THIRD TRIMESTER (HCC): ICD-10-CM

## 2024-06-26 DIAGNOSIS — E66.01 MATERNAL MORBID OBESITY IN THIRD TRIMESTER, ANTEPARTUM (HCC): ICD-10-CM

## 2024-06-26 DIAGNOSIS — Z3A.33 33 WEEKS GESTATION OF PREGNANCY: Primary | ICD-10-CM

## 2024-06-26 DIAGNOSIS — O24.419 GESTATIONAL DIABETES MELLITUS (GDM) IN THIRD TRIMESTER, GESTATIONAL DIABETES METHOD OF CONTROL UNSPECIFIED: ICD-10-CM

## 2024-06-26 DIAGNOSIS — O99.213 MATERNAL MORBID OBESITY IN THIRD TRIMESTER, ANTEPARTUM (HCC): ICD-10-CM

## 2024-06-26 DIAGNOSIS — O99.213 SEVERE OBESITY DUE TO EXCESS CALORIES AFFECTING PREGNANCY IN THIRD TRIMESTER (HCC): ICD-10-CM

## 2024-06-26 PROCEDURE — 59025 FETAL NON-STRESS TEST: CPT | Performed by: OBSTETRICS & GYNECOLOGY

## 2024-06-26 PROCEDURE — 76815 OB US LIMITED FETUS(S): CPT | Performed by: OBSTETRICS & GYNECOLOGY

## 2024-06-26 NOTE — PROGRESS NOTES
Repeat Non-Stress Testing:    Patient verbalizes +FM. Pt denies ALL:               Leaking of fluid   Contractions   Vaginal bleeding   Decreased fetal movement    Patient is performing daily kick counts. Patient has no questions or concerns.   NST strip reviewed by Dr. Ding.

## 2024-06-26 NOTE — TELEPHONE ENCOUNTER
Called [employer] spoke with Natalie @ # [phone] to set up LYFT transportation for patient's Maternal Fetal Medicine appointment.  Appointment scheduled on  July 2, 2024 at 3:45 PM at our [place].       Spoke with patient and explained LYFT will pick-up patient at 2:30 PM for Maternal Fetal Medicine appointment.   Patient instructed she has 5 minutes maximum to get into car upon arrival. Patient verbalized understanding.     Confirmed phone and address.   [phone]  [address] [phone]

## 2024-06-26 NOTE — LETTER
June 26, 2024     Beth Marrufo MD  [address] [facility] [address]    Patient: aNnette Camarillo   YOB: 2000   Date of Visit: 6/26/2024       Dear Dr. Marrufo:    Thank you for referring Nanette Camarillo to me for evaluation. Below are my notes for this consultation.    If you have questions, please do not hesitate to call me. I look forward to following your patient along with you.         Sincerely,        Arash Ding MD        CC: No Recipients    Arash Ding MD  6/26/2024 12:09 PM  Sign when Signing Visit  Please refer to the [facility] ultrasound report in Ob Procedures for additional information regarding today's visit

## 2024-06-26 NOTE — TELEPHONE ENCOUNTER
Called [employer] spoke with Carter @ # [phone] to set up LYFT transportation for patient's Maternal Fetal Medicine appointment.  Appointment scheduled on  June 26, 2024 at 11:00 AM at our [place].       Spoke with patient and explained LYFT will pick-up patient at 10:30 AM for Maternal Fetal Medicine appointment.   Patient instructed she has 5 minutes maximum to get into car upon arrival. Patient verbalized understanding.     Confirmed phone and address.   [phone]  [address] [place] [phone]

## 2024-07-01 ENCOUNTER — TELEPHONE (OUTPATIENT)
Dept: PERINATAL CARE | Facility: CLINIC | Age: 24
End: 2024-07-01

## 2024-07-01 ENCOUNTER — TELEMEDICINE (OUTPATIENT)
Dept: PERINATAL CARE | Facility: CLINIC | Age: 24
End: 2024-07-01
Payer: COMMERCIAL

## 2024-07-01 DIAGNOSIS — Z3A.34 34 WEEKS GESTATION OF PREGNANCY: ICD-10-CM

## 2024-07-01 DIAGNOSIS — O24.419 GESTATIONAL DIABETES MELLITUS (GDM) IN THIRD TRIMESTER, GESTATIONAL DIABETES METHOD OF CONTROL UNSPECIFIED: Primary | ICD-10-CM

## 2024-07-01 PROCEDURE — G0108 DIAB MANAGE TRN  PER INDIV: HCPCS

## 2024-07-01 NOTE — PROGRESS NOTES
"CLASS 2 - Individual  (virtual visit)    Thank you for referring your patient to [facility] Maternal Fetal Medicine Diabetes and Pregnancy Program.     Nanette Camarillo is a  24 y.o. female who presents today unaccompanied for Virtual Regular Visit, Patient Education, Gestational Diabetes, and Virtual Regular Visit.  Patient is at 34w0d gestation, Estimated Date of Delivery: 8/12/24.     Visit Diagnosis:  Encounter Diagnosis     ICD-10-CM    1. Gestational diabetes mellitus (GDM) in third trimester, gestational diabetes method of control unspecified  O24.419 Hemoglobin A1C     Comprehensive metabolic panel     Hemoglobin A1C     Comprehensive metabolic panel      2. 34 weeks gestation of pregnancy  Z3A.34 Hemoglobin A1C     Comprehensive metabolic panel     Hemoglobin A1C     Comprehensive metabolic panel         Reviewed and updated the following from patients medical record: PMH, Problem List, Allergies, and Current Medications.    Labs  GDM LABS: See Class 1 Note    A1C:  Lab Results   Component Value Date/Time    HGBA1C 5.5 02/20/2024 12:00 PM      Labs Ordered This Visit: A1C and CMP    Current Medications:    Current Outpatient Medications:     Blood Glucose Monitoring Suppl (OneTouch Verio Flex System) w/Device KIT, USE TO CHECK GLUCOSE 4 TIMES DAILY. One Touch Verio Flex., Disp: 1 kit, Rfl: 0    glucose blood (OneTouch Verio) test strip, USE 1 STRIP TO CHECK GLUCOSE 4 TIMES DAILY. (One Touch Verio Testing Strips), Disp: 100 each, Rfl: 4    OneTouch Delica Lancets 33G MISC, Use 4 a Day or as instructed, Disp: 100 each, Rfl: 4    Prenatal Vit-Fe Fumarate-FA (Prenatal Plus Vitamin/Mineral) 27-1 MG TABS, Take 1 tablet by mouth in the morning, Disp: 90 tablet, Rfl: 3    acetaminophen (TYLENOL) 500 mg tablet, Take 1 tablet (500 mg total) by mouth every 6 (six) hours as needed for mild pain for up to 7 days, Disp: 28 tablet, Rfl: 0     Anthropometrics:  Ht Readings from Last 1 Encounters:   07/02/24 5' 3\" (1.6 m)    "   Wt Readings from Last 3 Encounters:   24 113 kg (249 lb 6.4 oz)   24 114 kg (250 lb 12.8 oz)   24 114 kg (251 lb 9.6 oz)        Pre-Gravid Wt Pre-Gravid BMI TWG   104 kg (230 lb) 40.75 9.435 kg (20 lb 12.8 oz)     Total Pregnancy Weight Gain Recommendations: BMI (> 30) 11-20 lbs  Current Wt Status Compared to Recommendations: Exceeding -- Recommended to maintain wt for remainder of pregnancy    Most Recent Ultrasound Results:  Findings: () AC: 91%, EFW: 90%, NML AMERICO  Next US date: Scheduled Appropriately    BLOOD GLUCOSE MONITORING:   Glucometer: OneTouch Verio Flex     Reinforced at Today's Visit:   Timing/Frequency of SMB x per day (Fasting, 2 hour after start of each meal)  Goals: (Fasting) 60-95mg/dL // (2hr PP) <120mg/dL  Reporting Guidelines: Weekly via Phone: [phone] OR My Chart (Message with image attachment) OR Glucose Flowsheet  Method of Reporting: [place] [employer] Glucose Flowsheet    BG LOG:     Date Fasting Post-  breakfast Post-  lunch Post-  dinner Comments   24 106       24 177       24 96 73      24 100 170 - 169      Review of Blood Glucose Log:   FBG = Not well controlled  Post-Prandial BG =  unable to assess; missing majority of after meal blood sugars    MEAL PLAN (Patient was provided with a meal plan including 3 meals and 3 snacks at class 1)  *Calories: 2000 calorie (CHO:45-15-60-15-60-30) (PRO: 2/3-1-3/4-1-3/4-2)    Review of Patient's Current Diet: refer to class 1 note for additional details  - Dislikes Eggs    Wake-up: 9-10am  Breakfast (11:30am): Cereal w/ Milk or Dry OR Pancakes   AM Snack: Not reviewed  Lunch (2-3pm): Pizza, [place], Leftovers, Fruit  PM Snack: Not reviewed  Dinner (9pm): Pasta OR Rice  Bedtime Snack(11pm-12am): Cookie OR Cereal w/ Sugar OR Candy    Beverages: Coffee (2 scoops of sugar + creamer) OR Green Tea ([place])     Reinforced Diet Instructions:  Individualized meal plan.   Importance of  consistent carbohydrate intake via 3 meals and 3 snacks per day   Importance of protein as it relates to blood glucose control.   Encouraged  patient to eat every 2.0-3.5 hours while awake  Encouraged patient to go no longer than 8-10 hours fasting overnight until first meal of the day.  Provided suggested meal/snack options to increase nutrition and maintain consistent meal and snack intakes.    Physical Activity:    Reviewed w/ Pt:   Benefits of physical activity to optimize blood glucose control, encouraged activity at patient is physically able.   Instructed pt to always consult a physician prior to starting an exercise program.   Recommend 20-30 minutes daily.    Additional Topics Reviewed:    Medications: (reviewed options available with pt)  Discussed if blood sugars are not within normal range with meal planning and exercise  Reviewed medication such as metformin and/or basal/bolus insulin may be needed for better glucose control  Maternal-Fetal Testing:   Ultrasounds: growth scans every 4 weeks.  NST: twice weekly starting at 32nd week GA  AMERICO:  weekly starting at 32 weeks GA  Sick day Guidelines:   Advised that sickness will raise blood sugar   If blood sugar is > 160 mg/dL twice in one day call doctor  If on diabetes medications, continue as instructed   If unable to consume normal meal plan, instructed to remain well hydrated   Hypoglycemia & Treatment Guidelines:  Reviewed what hypoglycemia is, signs and symptoms, and how to treat via the 15:15 rule.  Post-Partum Guidelines:  Completion of 75 gm CHO 2 hr gtt at 6 weeks post-partum to check for Type 2 DM diagnosis  Breastfeeding Guidelines:  Continue GDM meal plan plus additional 350-500 calories daily  Examples of protein and carbohydrate snacks provided.  Stay hydrated by drinking 8-10 (8 oz.) fluids daily.  Dining Out & Travel Guidelines:  Patient advised to be prepared with extra diabetes supplies, medications, and snacks, as well as sticking to the  "same time schedule and portions eaten at home for meals and snacks.    Patient Stated Goal: \"I will walk 20-30 minutes after dinner daily\"  Goal Assessment: Not on track    Diabetes Self Management Support Plan outside of ongoing care: Spouse/Family    Barriers to Learning/Change: No Barriers  Expected Compliance: good    Date to report blood sugars: Weekly   Follow up:  Return in 1 week (on 2024), or Insulin education if fasting blood sugars remain >95.     Begin Time: 10:00am  End Time: 11:00am    It was a pleasure working with them today. Please feel free to call [phone]) with any questions or concerns.    Gita Mack RD   Diabetes Educator  [facility] Maternal Fetal Medicine  Diabetes and Pregnancy Program  [address], [address]  [place] [address]    Virtual Regular Visit    Verification of patient location:    Patient is located at Home in the following state in which I hold an active license [place]      Assessment/Plan:    Problem List Items Addressed This Visit       Diet controlled gestational diabetes mellitus (GDM) in third trimester - Primary     Other Visit Diagnoses       34 weeks gestation of pregnancy        Relevant Orders    Hemoglobin A1C    Comprehensive metabolic panel                 Reason for visit is   Chief Complaint   Patient presents with    Virtual Regular Visit    Patient Education    Gestational Diabetes    Virtual Regular Visit          Encounter provider Gita Mack RD      Recent Visits  Date Type Provider Dept   24 Telephone Gita Mack RD [facility]   24 Telemedicine Gita Mack RD [facility]   Showing recent visits within past 7 days and meeting all other requirements  Future Appointments  No visits were found meeting these conditions.  Showing future appointments within next 150 days and meeting all other requirements       The patient was identified by name and date of birth. Nanette Camarillo was informed that this is a " telemedicine visit and that the visit is being conducted through the Epic Embedded platform. She agrees to proceed..  My office door was closed. No one else was in the room.  She acknowledged consent and understanding of privacy and security of the video platform. The patient has agreed to participate and understands they can discontinue the visit at any time.    Patient is aware this is a billable service.     Subjective  Nanette Camarillo is a 24 y.o. female pregnant.      HPI     Past Medical History:   Diagnosis Date    Allergic     Morphine    Anxiety     previously on meds Buspar    Arthritis     Possible Arthritis in knee    Asthma     Depression     History of anxiety     Low grade squamous intraepithelial lesion on cytologic smear of cervix (LGSIL)     Formatting of this note might be different from the original.   Patient to have Co testing in 1 year    Maternal iron deficiency anemia affecting pregnancy in third trimester, antepartum 2024    Obesity     Psychiatric disorder     depression, anxiety       Past Surgical History:   Procedure Laterality Date     SECTION      IL TX MISSED  FIRST TRIMESTER SURGICAL N/A 2023    Procedure: DILATATION AND EVACUATION (D&E) 17 WEEKS;  Surgeon: Serenity Calvo MD;  [facility];  Service: Gynecology       Current Outpatient Medications   Medication Sig Dispense Refill    Blood Glucose Monitoring Suppl (OneTouch Verio Flex System) w/Device KIT USE TO CHECK GLUCOSE 4 TIMES DAILY. One Touch Verio Flex. 1 kit 0    glucose blood (OneTouch Verio) test strip USE 1 STRIP TO CHECK GLUCOSE 4 TIMES DAILY. (One Touch Verio Testing Strips) 100 each 4    OneTouch Delica Lancets 33G MISC Use 4 a Day or as instructed 100 each 4    Prenatal Vit-Fe Fumarate-FA (Prenatal Plus Vitamin/Mineral) 27-1 MG TABS Take 1 tablet by mouth in the morning 90 tablet 3    acetaminophen (TYLENOL) 500 mg tablet Take 1 tablet (500 mg total) by mouth every 6 (six)  hours as needed for mild pain for up to 7 days 28 tablet 0     No current facility-administered medications for this visit.        Allergies   Allergen Reactions    Morphine Itching    Wound Dressing Adhesive Blisters     Surgical tape       Review of Systems    Video Exam    There were no vitals filed for this visit.    Physical Exam     Visit Time  Total Visit Duration: 60min         stable

## 2024-07-02 ENCOUNTER — TELEPHONE (OUTPATIENT)
Age: 24
End: 2024-07-02

## 2024-07-02 ENCOUNTER — TELEPHONE (OUTPATIENT)
Dept: INFUSION CENTER | Facility: HOSPITAL | Age: 24
End: 2024-07-02

## 2024-07-02 ENCOUNTER — HOSPITAL ENCOUNTER (OUTPATIENT)
Dept: INFUSION CENTER | Facility: HOSPITAL | Age: 24
Discharge: HOME | End: 2024-07-02
Attending: STUDENT IN AN ORGANIZED HEALTH CARE EDUCATION/TRAINING PROGRAM

## 2024-07-02 ENCOUNTER — ROUTINE PRENATAL (OUTPATIENT)
Dept: PERINATAL CARE | Facility: OTHER | Age: 24
End: 2024-07-02
Payer: COMMERCIAL

## 2024-07-02 VITALS
DIASTOLIC BLOOD PRESSURE: 62 MMHG | HEART RATE: 99 BPM | SYSTOLIC BLOOD PRESSURE: 124 MMHG | BODY MASS INDEX: 44.19 KG/M2 | WEIGHT: 249.4 LBS | HEIGHT: 63 IN

## 2024-07-02 DIAGNOSIS — O99.891 BACK PAIN DURING PREGNANCY: Primary | ICD-10-CM

## 2024-07-02 DIAGNOSIS — O99.213 SEVERE OBESITY DUE TO EXCESS CALORIES AFFECTING PREGNANCY IN THIRD TRIMESTER (HCC): ICD-10-CM

## 2024-07-02 DIAGNOSIS — Z3A.34 34 WEEKS GESTATION OF PREGNANCY: ICD-10-CM

## 2024-07-02 DIAGNOSIS — E66.01 SEVERE OBESITY DUE TO EXCESS CALORIES AFFECTING PREGNANCY IN THIRD TRIMESTER (HCC): ICD-10-CM

## 2024-07-02 DIAGNOSIS — M54.9 BACK PAIN DURING PREGNANCY: Primary | ICD-10-CM

## 2024-07-02 PROBLEM — O24.410 DIET CONTROLLED GESTATIONAL DIABETES MELLITUS (GDM) IN THIRD TRIMESTER: Status: ACTIVE | Noted: 2024-06-17

## 2024-07-02 PROCEDURE — 76815 OB US LIMITED FETUS(S): CPT | Performed by: OBSTETRICS & GYNECOLOGY

## 2024-07-02 RX ORDER — ACETAMINOPHEN 500 MG
500 TABLET ORAL EVERY 6 HOURS PRN
Qty: 28 TABLET | Refills: 0 | Status: SHIPPED | OUTPATIENT
Start: 2024-07-02 | End: 2024-07-09

## 2024-07-02 NOTE — PROGRESS NOTES
This patient received  care under my supervision on 24 at 34w1d gestational age at [facility].  Tylenol was refilled at her request.  -Mery Monroy MD

## 2024-07-02 NOTE — TELEPHONE ENCOUNTER
Pt called to cancel appt advised OB/GYN office did not set her Lyft up. Pt scheduled for next appt on 7/9

## 2024-07-02 NOTE — TELEPHONE ENCOUNTER
Patient calling in regarding getting lysandra set up for appointment on 7/9 for blood transfusion. Just want to confirm this is set for her for that appointment.

## 2024-07-02 NOTE — TELEPHONE ENCOUNTER
Called pt and states she was advised by infusion [place] that we need to set up lyft for the pt as we are the ordering physicians.  Spoke to [facility] and states they will check with Elena and let us know.

## 2024-07-03 ENCOUNTER — ROUTINE PRENATAL (OUTPATIENT)
Dept: OBGYN CLINIC | Facility: CLINIC | Age: 24
End: 2024-07-03

## 2024-07-03 VITALS
SYSTOLIC BLOOD PRESSURE: 110 MMHG | BODY MASS INDEX: 43.93 KG/M2 | DIASTOLIC BLOOD PRESSURE: 70 MMHG | HEART RATE: 95 BPM | WEIGHT: 248 LBS

## 2024-07-03 DIAGNOSIS — Z34.83 PRENATAL CARE, SUBSEQUENT PREGNANCY, THIRD TRIMESTER: Primary | ICD-10-CM

## 2024-07-03 DIAGNOSIS — Z36.89 NST (NON-STRESS TEST) REACTIVE ON FETAL SURVEILLANCE: ICD-10-CM

## 2024-07-03 PROCEDURE — PNV: Performed by: NURSE PRACTITIONER

## 2024-07-03 NOTE — TELEPHONE ENCOUNTER
Pt was provided with handout today for the transportation services for [place] medicaid.  Advised pt to call if any further questions or concerns.

## 2024-07-03 NOTE — PROGRESS NOTES
Nanette Camarillo is a 24 y.o.  at 34w2d. Here for PNV and NST.   Reports +FM  She is doing well. Denies LOF/Bleeding. + Cramping. Occasional HA - takes tylenol. Denies n/v, edema. Denies tobacco, drugs or ETOH use. Denies DV.     Visit Vitals  /70 (BP Location: Right arm, Cuff Size: Large)   Pulse 95   Wt 112 kg (248 lb)   LMP 2023   BMI 43.93 kg/m²   OB Status Pregnant   Smoking Status Every Day   BSA 2.11 m²     Pre- Vitals      Flowsheet Row Most Recent Value   Prenatal Assessment    Fetal Heart Rate 158   Fundal Height (cm) 36 cm   Movement Present   Prenatal Vitals    Blood Pressure 110/70   Weight - Scale 112 kg (248 lb)   Urine Albumin/Glucose    Dilation/Effacement/Station    Vaginal Drainage    Edema             Urine neg/neg    NST reactive and reassuring.     Diagnoses and all orders for this visit:    Prenatal care, subsequent pregnancy, third trimester    NST (non-stress test) reactive on fetal surveillance      Continue [facility]  Watch for S/S of  labor.     RTO in 2 weeks for PNV or sooner if needed.

## 2024-07-08 ENCOUNTER — TELEPHONE (OUTPATIENT)
Dept: PERINATAL CARE | Facility: CLINIC | Age: 24
End: 2024-07-08

## 2024-07-08 DIAGNOSIS — O99.013 MATERNAL IRON DEFICIENCY ANEMIA AFFECTING PREGNANCY IN THIRD TRIMESTER, ANTEPARTUM: Primary | ICD-10-CM

## 2024-07-08 DIAGNOSIS — D50.9 MATERNAL IRON DEFICIENCY ANEMIA AFFECTING PREGNANCY IN THIRD TRIMESTER, ANTEPARTUM: Primary | ICD-10-CM

## 2024-07-08 RX ORDER — SODIUM CHLORIDE 9 MG/ML
20 INJECTION, SOLUTION INTRAVENOUS ONCE
Status: CANCELLED | OUTPATIENT
Start: 2024-07-09

## 2024-07-08 NOTE — TELEPHONE ENCOUNTER
Reason for Call: Follow-up (Pt was scheduled for f/up today at 12pm) and Gestational Diabetes (35w0d)    Outcome: Spoke with patient. She has not tested any blood sugars since last appointment therefore there are no blood sugars to review during the appointment. Since we are unable to determine blood sugar control - the appointment was rescheduled to Friday (7/12) at 12pm.    Gita Mack RD  Diabetes Educator   [facility]  Diabetes and Pregnancy Program

## 2024-07-09 ENCOUNTER — HOSPITAL ENCOUNTER (OUTPATIENT)
Dept: INFUSION CENTER | Facility: HOSPITAL | Age: 24
Discharge: HOME | End: 2024-07-09
Attending: STUDENT IN AN ORGANIZED HEALTH CARE EDUCATION/TRAINING PROGRAM
Payer: COMMERCIAL

## 2024-07-09 VITALS
DIASTOLIC BLOOD PRESSURE: 59 MMHG | RESPIRATION RATE: 18 BRPM | OXYGEN SATURATION: 95 % | HEART RATE: 93 BPM | SYSTOLIC BLOOD PRESSURE: 112 MMHG | TEMPERATURE: 97.8 F

## 2024-07-09 DIAGNOSIS — O99.013 MATERNAL IRON DEFICIENCY ANEMIA AFFECTING PREGNANCY IN THIRD TRIMESTER, ANTEPARTUM: Primary | ICD-10-CM

## 2024-07-09 DIAGNOSIS — D50.9 MATERNAL IRON DEFICIENCY ANEMIA AFFECTING PREGNANCY IN THIRD TRIMESTER, ANTEPARTUM: Primary | ICD-10-CM

## 2024-07-09 RX ORDER — SODIUM CHLORIDE 9 MG/ML
20 INJECTION, SOLUTION INTRAVENOUS ONCE
Status: CANCELLED | OUTPATIENT
Start: 2024-07-16

## 2024-07-09 RX ORDER — SODIUM CHLORIDE 9 MG/ML
20 INJECTION, SOLUTION INTRAVENOUS ONCE
Status: COMPLETED | OUTPATIENT
Start: 2024-07-09 | End: 2024-07-09

## 2024-07-09 RX ADMIN — IRON SUCROSE 200 MG: 20 INJECTION, SOLUTION INTRAVENOUS at 13:31

## 2024-07-09 RX ADMIN — SODIUM CHLORIDE 20 ML/HR: 0.9 INJECTION, SOLUTION INTRAVENOUS at 13:31

## 2024-07-09 NOTE — PROGRESS NOTES
Nanette LARRY Camarillo  tolerated venofer infusion well with no complications. Aware of future appt on 7/16/24 at 1300. AVS declined. Patient left clinic ambulatory.

## 2024-07-10 ENCOUNTER — TELEPHONE (OUTPATIENT)
Facility: HOSPITAL | Age: 24
End: 2024-07-10

## 2024-07-10 NOTE — TELEPHONE ENCOUNTER
Called [employer] spoke with Natalie @ # [phone] to set up LYFT transportation for patient's Maternal Fetal Medicine appointment.  Appointment scheduled on  July 12, 2024 at 3:15 PM at our [place].       Spoke with patient and explained LYFT will pick-up patient at 2:40 PM for Maternal Fetal Medicine appointment.   Patient instructed she has 5 minutes maximum to get into car upon arrival. Patient verbalized understanding.     Confirmed phone and address.   [phone]  [address] [place] [phone]

## 2024-07-12 ENCOUNTER — TELEPHONE (OUTPATIENT)
Age: 24
End: 2024-07-12

## 2024-07-12 ENCOUNTER — ULTRASOUND (OUTPATIENT)
Facility: HOSPITAL | Age: 24
End: 2024-07-12
Payer: COMMERCIAL

## 2024-07-12 ENCOUNTER — TELEMEDICINE (OUTPATIENT)
Dept: PERINATAL CARE | Facility: CLINIC | Age: 24
End: 2024-07-12
Payer: COMMERCIAL

## 2024-07-12 VITALS
OXYGEN SATURATION: 99 % | SYSTOLIC BLOOD PRESSURE: 112 MMHG | BODY MASS INDEX: 44.54 KG/M2 | HEIGHT: 63 IN | HEART RATE: 82 BPM | DIASTOLIC BLOOD PRESSURE: 62 MMHG | WEIGHT: 251.4 LBS

## 2024-07-12 DIAGNOSIS — J45.20 MILD INTERMITTENT ASTHMA WITHOUT COMPLICATION: ICD-10-CM

## 2024-07-12 DIAGNOSIS — Z3A.35 35 WEEKS GESTATION OF PREGNANCY: ICD-10-CM

## 2024-07-12 DIAGNOSIS — Z3A.35 35 WEEKS GESTATION OF PREGNANCY: Primary | ICD-10-CM

## 2024-07-12 DIAGNOSIS — E66.01 CLASS 3 SEVERE OBESITY DUE TO EXCESS CALORIES WITHOUT SERIOUS COMORBIDITY WITH BODY MASS INDEX (BMI) OF 40.0 TO 44.9 IN ADULT (HCC): ICD-10-CM

## 2024-07-12 DIAGNOSIS — O24.410 DIET CONTROLLED GESTATIONAL DIABETES MELLITUS (GDM) IN THIRD TRIMESTER: ICD-10-CM

## 2024-07-12 DIAGNOSIS — O24.410 DIET CONTROLLED GESTATIONAL DIABETES MELLITUS (GDM) IN THIRD TRIMESTER: Primary | ICD-10-CM

## 2024-07-12 PROCEDURE — 59025 FETAL NON-STRESS TEST: CPT | Performed by: OBSTETRICS & GYNECOLOGY

## 2024-07-12 PROCEDURE — 76815 OB US LIMITED FETUS(S): CPT | Performed by: OBSTETRICS & GYNECOLOGY

## 2024-07-12 PROCEDURE — G0108 DIAB MANAGE TRN  PER INDIV: HCPCS

## 2024-07-12 NOTE — PROGRESS NOTES
"Follow-Up Visit   (virtual visit)    Thank you for referring your patient to [facility] Maternal Fetal Medicine Diabetes and Pregnancy Program.     Subjective:     Nanette Camarillo is a 24 y.o. female who presents today unaccompanied for Virtual Regular Visit, Patient Education, and Gestational Diabetes.     Patient is at 35w4d gestation, Estimated Date of Delivery: 8/12/24.     Reviewed and updated the following from patients medical record: Demographics, Education, Occupation, PMH, Problem List, Allergies, and Current Medications.    Visit Diagnosis:  Encounter Diagnosis     ICD-10-CM    1. Diet controlled gestational diabetes mellitus (GDM) in third trimester  O24.410       2. 35 weeks gestation of pregnancy  Z3A.35            Current Outpatient Medications  Current Outpatient Medications   Medication Instructions    Blood Glucose Monitoring Suppl (OneTouch Verio Flex System) w/Device KIT USE TO CHECK GLUCOSE 4 TIMES DAILY. One Touch Verio Flex.    glucose blood (OneTouch Verio) test strip USE 1 STRIP TO CHECK GLUCOSE 4 TIMES DAILY. (One Touch Verio Testing Strips)    OneTouch Delica Lancets 33G MISC Use 4 a Day or as instructed    Prenatal Vit-Fe Fumarate-FA (Prenatal Plus Vitamin/Mineral) 27-1 MG TABS 1 tablet, Oral, Daily      Anthropometrics:  Ht Readings from Last 1 Encounters:   07/12/24 5' 3\" (1.6 m)      Wt Readings from Last 3 Encounters:   07/12/24 114 kg (251 lb 6.4 oz)   07/03/24 112 kg (248 lb)   07/02/24 113 kg (249 lb 6.4 oz)      Pre-Gravid Wt Pre-Gravid BMI TWG   104 kg (230 lb) 40.75 9.707 kg (21 lb 6.4 oz)     Total Pregnancy Weight Gain Recommendations: BMI (> 30) 11-20 lbs  Current Wt Status Compared to Recommendations: Exceeding -- Recommended to maintain wt for remainder of pregnancy    Most Recent Ultrasound Results:  Findings: (6/17/24) AC: 91%, EFW: 90%, NML AMERICO  Further Fetal Surveillance: Yes, NST once weekly and AMERICO once weekly d/t Obesity   Next US date: Scheduled Appropriately - " "24    BLOOD GLUCOSE MONITORING:   Glucometer: OneTouch Verio Flex     Reinforced at Today's Visit:   Timing/Frequency of SMB x per day (Fasting, 2 hour after start of each meal)  Goals: (Fasting) 60-95mg/dL // (2hr PP) <120mg/dL  Reporting Guidelines: Weekly via Phone: [phone] OR My Chart (Message with image attachment) OR Glucose Flowsheet  Method of Reporting: [employer] Glucose Flowsheet    BG LOG:     FBG this morning is 93; Tested at 12:10pm during appointment        Review of Blood Glucose Log: Limited assessment d/t minimal reported blood sugars. Discussed with pt the importance hof monitoring blood sugars and potential complications of hyperglycemia during pregnancy. Patient report she is having difficulty remembering to test her blood sugars and is feeling more fatigued d/t iron deficiency. She mentions that she will due to her best to begin testing her blood sugars consistently.    MEAL PLAN (Patient was provided with a meal plan including 3 meals and 3 snacks at class 1)  *Continue Meal Plan: 2000 calorie (CHO:45-15-60-15-60-30) (PRO: /3-1-3/4-1-3/-2)    Review of Patient's Current Diet: Pt reports she is following the meal plan. She is using the MyPlate method and being mindful of carbohydrate intake. She is pairing meals and snacks with protein. She typically skips breakfast due to sleeping in. She is avoiding sugar sweetened beverages.     Physical Activity:    Reviewed w/ Pt:   Benefits of physical activity to optimize blood glucose control, encouraged activity at patient is physically able.   Instructed pt to always consult a physician prior to starting an exercise program.   Recommend 20-30 minutes daily.    Patient Stated Goal: \"I will walk 20-30 minutes after dinner daily\"  Goal Assessment: On track    Diabetes Self Management Support Plan outside of ongoing care: Spouse/Family    Barriers to Learning/Change: Motivation  Expected Compliance: fair    Date to report blood sugars: " Weekly   Follow up:  Return for per review of weekly blood sugar log.     Begin Time: 12:00pm  End Time: 12:40pm    It was a pleasure working with them today. Please feel free to call ([phone]) with any questions or concerns.    Gita Mack RD   Diabetes Educator  [facility] Maternal Fetal Medicine  Diabetes and Pregnancy Program  [address] [place] [address], [address]  [place] [address]    Virtual Regular Visit  Name: Nanette Camarillo      : 2000      MRN: [id number]  Encounter Provider: Gita Mack RD  Encounter Date: 2024   Encounter department: [facility]    Verification of patient location:    Patient is located at Home in the following state in which I hold an active license [place]    Assessment & Plan   1. Diet controlled gestational diabetes mellitus (GDM) in third trimester  2. 35 weeks gestation of pregnancy        Encounter provider Gita Mack RD    The patient was identified by name and date of birth. Nanette Camarillo was informed that this is a telemedicine visit and that the visit is being conducted through the MainOne platform. She agrees to proceed..  My office door was closed. No one else was in the room.  She acknowledged consent and understanding of privacy and security of the video platform. The patient has agreed to participate and understands they can discontinue the visit at any time.    Patient is aware this is a billable service.     History of Present Illness     Nanette Camarillo is a 24 y.o. female who presents pregnant    Review of Systems    Objective     LMP 2023   Physical Exam    Visit Time  Total Visit Duration: 40min

## 2024-07-12 NOTE — PROGRESS NOTES
A fetal ultrasound and NST were completed. See Ob procedures in Epic for an interpretation and recommendations. Do not hesitate to contact us in [facility] if you have questions.    Genaro Phillips MD, MSCE  Maternal Fetal Medicine

## 2024-07-12 NOTE — PROGRESS NOTES
Repeat Non-Stress Testing:    Patient verbalizes +FM. Pt denies ALL:               Leaking of fluid   Contractions   Vaginal bleeding   Decreased fetal movement    Patient is performing daily kick counts. Patient has no questions or concerns.   NST strip reviewed by Dr. Dr. Phillips.

## 2024-07-12 NOTE — TELEPHONE ENCOUNTER
Patient christian did not arrive.  Spoke with Germán at [facility].  She will call Lysandra and call patient back with status of her ride.

## 2024-07-12 NOTE — TELEPHONE ENCOUNTER
Nurse [place] from [facility] [place] [facility] called to verify last appt pt had and next upcoming appt pt has.

## 2024-07-15 ENCOUNTER — TELEPHONE (OUTPATIENT)
Facility: HOSPITAL | Age: 24
End: 2024-07-15

## 2024-07-15 ENCOUNTER — HOSPITAL ENCOUNTER (OUTPATIENT)
Facility: HOSPITAL | Age: 24
Discharge: HOME | End: 2024-07-15
Attending: STUDENT IN AN ORGANIZED HEALTH CARE EDUCATION/TRAINING PROGRAM | Admitting: STUDENT IN AN ORGANIZED HEALTH CARE EDUCATION/TRAINING PROGRAM
Payer: COMMERCIAL

## 2024-07-15 ENCOUNTER — NURSE TRIAGE (OUTPATIENT)
Age: 24
End: 2024-07-15

## 2024-07-15 VITALS — SYSTOLIC BLOOD PRESSURE: 110 MMHG | TEMPERATURE: 98.7 F | HEART RATE: 84 BPM | DIASTOLIC BLOOD PRESSURE: 62 MMHG

## 2024-07-15 PROBLEM — N76.0 BACTERIAL VAGINOSIS: Status: ACTIVE | Noted: 2024-07-15

## 2024-07-15 PROBLEM — Z3A.36 36 WEEKS GESTATION OF PREGNANCY: Status: ACTIVE | Noted: 2024-07-15

## 2024-07-15 PROBLEM — O26.893 PELVIC PAIN AFFECTING PREGNANCY IN THIRD TRIMESTER, ANTEPARTUM: Status: ACTIVE | Noted: 2024-07-15

## 2024-07-15 PROBLEM — R10.2 PELVIC PAIN AFFECTING PREGNANCY IN THIRD TRIMESTER, ANTEPARTUM: Status: ACTIVE | Noted: 2024-07-15

## 2024-07-15 PROBLEM — B96.89 BACTERIAL VAGINOSIS: Status: ACTIVE | Noted: 2024-07-15

## 2024-07-15 LAB
AMORPH URATE CRY URNS QL MICRO: ABNORMAL
BACTERIA UR QL AUTO: ABNORMAL /HPF
BILIRUB UR QL STRIP: ABNORMAL
BILIRUB UR QL STRIP: NEGATIVE
CLARITY UR: ABNORMAL
CLARITY UR: ABNORMAL
COLOR UR: ABNORMAL
COLOR UR: ABNORMAL
GLUCOSE UR STRIP-MCNC: ABNORMAL MG/DL
GLUCOSE UR STRIP-MCNC: NEGATIVE MG/DL
HGB UR QL STRIP.AUTO: NEGATIVE
HGB UR QL STRIP.AUTO: NEGATIVE
KETONES UR STRIP-MCNC: NEGATIVE MG/DL
KETONES UR STRIP-MCNC: NEGATIVE MG/DL
LEUKOCYTE ESTERASE UR QL STRIP: ABNORMAL
LEUKOCYTE ESTERASE UR QL STRIP: NEGATIVE
MUCOUS THREADS UR QL AUTO: ABNORMAL
NITRITE UR QL STRIP: NEGATIVE
NITRITE UR QL STRIP: NEGATIVE
NON-SQ EPI CELLS URNS QL MICRO: ABNORMAL /HPF
PH UR STRIP.AUTO: 6 [PH]
PH UR STRIP.AUTO: 6 [PH] (ref 4.5–8)
PROT UR STRIP-MCNC: ABNORMAL MG/DL
PROT UR STRIP-MCNC: ABNORMAL MG/DL
RBC #/AREA URNS AUTO: ABNORMAL /HPF
SP GR UR STRIP.AUTO: 1.04 (ref 1–1.03)
SP GR UR STRIP.AUTO: >=1.03 (ref 1–1.03)
UROBILINOGEN UR QL STRIP.AUTO: 4 E.U./DL
UROBILINOGEN UR STRIP-ACNC: 3 MG/DL
WBC #/AREA URNS AUTO: ABNORMAL /HPF

## 2024-07-15 PROCEDURE — 59025 FETAL NON-STRESS TEST: CPT | Performed by: STUDENT IN AN ORGANIZED HEALTH CARE EDUCATION/TRAINING PROGRAM

## 2024-07-15 PROCEDURE — 81001 URINALYSIS AUTO W/SCOPE: CPT | Performed by: STUDENT IN AN ORGANIZED HEALTH CARE EDUCATION/TRAINING PROGRAM

## 2024-07-15 PROCEDURE — 99213 OFFICE O/P EST LOW 20 MIN: CPT

## 2024-07-15 PROCEDURE — 99214 OFFICE O/P EST MOD 30 MIN: CPT | Performed by: STUDENT IN AN ORGANIZED HEALTH CARE EDUCATION/TRAINING PROGRAM

## 2024-07-15 RX ORDER — METRONIDAZOLE 500 MG/1
500 TABLET ORAL EVERY 12 HOURS SCHEDULED
Status: DISCONTINUED | OUTPATIENT
Start: 2024-07-15 | End: 2024-07-15 | Stop reason: HOSPADM

## 2024-07-15 RX ORDER — CYCLOBENZAPRINE HCL 5 MG
5 TABLET ORAL ONCE
Status: COMPLETED | OUTPATIENT
Start: 2024-07-15 | End: 2024-07-15

## 2024-07-15 RX ADMIN — METRONIDAZOLE 500 MG: 500 TABLET ORAL at 19:23

## 2024-07-15 RX ADMIN — CYCLOBENZAPRINE HYDROCHLORIDE 5 MG: 5 TABLET, FILM COATED ORAL at 18:41

## 2024-07-15 NOTE — PROCEDURES
Nanette Camarillo, a  at 36w0d with an JAIME of 2024, by Last Menstrual Period, [facility] for the following procedure(s): $Procedure Type: NST]    Nonstress Test  Reason for NST: Routine  Variability: Moderate  Decelerations: None  Accelerations: Yes  Baseline: 130 BPM  Uterine Irritability: No  Contractions: Not present              Ultrasound Other  Fetal Presentation: Vertex    Interpretation  Nonstress Test Interpretation: Reactive

## 2024-07-15 NOTE — PROGRESS NOTES
Triage Note - OB  Nanette Camarillo 24 y.o. female MRN: [id number]  Unit/Bed#:  TRIAGE 3-01 Encounter: [id number]    OB TRIAGE NOTE  Nanette Camarillo  [id number]  7/15/2024  6:48 PM  LD TRIAGE 3/LD TRIAGE 3-*    ASSESS:  Nanette is a 24 y.o.  36w0d with hx of 1CS-->  who present for abdominopelvic pain    PLAN  #1. Abdominal/pelvic pain:   SVE closed/50/-4/med/posterior, no PTL  TAUS without ttp along prior incision, no bleeding or fluid outside of uterus  Wet mount with bacterial vaginosis, will send treatment  Urine appears cloudy, will send for UA/Ucx  Reviewed dehydration and infection can lead to crampiness, recommend aggressive hydration at home, treatment for BV, will notify if any concerns with urine  Reviewed strict precautions    #2. Fetal wellbeing:   NST reactive  TAUS: VERTEX 07/15/24     #. Discharge instructions  Patient instructed to call if experiencing worsening contractions, vaginal bleeding, loss of fluid or decreased fetal movement.  Will follow up with OBGYN on 24.    ______________    SUBJECTIVE    JAIME: Estimated Date of Delivery: 24    HPI Chronology:  24 y.o.  36w0d presents with complaint of abdominal/pelvic pain.     Contractions: unsure  Leakage: no  Bleeding: no  Fetal Movement: yes  Pelvic pain: yes--has been having abdominal and pelvic pain all day, tried tylenol, warm shower and cold shower without relief    No nausea or vomiting  No unusual foods    Vitals:   /62   Pulse 84   Temp 98.7 °F (37.1 °C) (Oral)   LMP 2023   There is no height or weight on file to calculate BMI.    Physical Exam  Constitutional:       Appearance: Normal appearance.   HENT:      Head: Normocephalic.   Eyes:      Extraocular Movements: Extraocular movements intact.      Conjunctiva/sclera: Conjunctivae normal.   Pulmonary:      Effort: Pulmonary effort is normal.   Abdominal:      General: There is no distension.      Palpations: Abdomen is soft.      Comments:  Mild ttp diffusely, no rebound or guarding, no palpable contractions, no ttp along incision   Genitourinary:     Comments: SVE closed/50/-3    Musculoskeletal:         General: Normal range of motion.      Cervical back: Normal range of motion.   Skin:     General: Skin is warm and dry.   Neurological:      General: No focal deficit present.      Mental Status: She is alert.   Psychiatric:         Mood and Affect: Mood normal.         Behavior: Behavior normal.         Thought Content: Thought content normal.         Speculum exam: white discharge, no bleeding, no pooling, cervix appears closed  KOH/wet mount: ++clue cells, +whiff, no yeast, no ferning    FHT:  Baseline Rate (FHR): 125 bpm  Variability: Moderate  Accelerations: 15 x 15 or greater  Decelerations: None    TOCO:   Contraction Frequency (minutes): X1  Contraction Duration (seconds): 60    Labs:   Recent Results (from the past 24 hour(s))   Urine Macroscopic, POC    Collection Time: 07/15/24  6:33 PM   Result Value Ref Range    Color, UA Maria Fernanda     Clarity, UA Cloudy     pH, UA 6.0 4.5 - 8.0    Leukocytes, UA Negative Negative    Nitrite, UA Negative Negative    Protein, UA 30 (1+) (A) Negative mg/dl    Glucose,  (1/10%) (A) Negative mg/dl    Ketones, UA Negative Negative mg/dl    Urobilinogen, UA 4.0 (A) 0.2, 1.0 E.U./dl E.U./dl    Bilirubin, UA Small (A) Negative    Occult Blood, UA Negative Negative    Specific Gravity, UA >=1.030 1.003 - 1.030       Lab, Imaging and other studies: I have personally reviewed pertinent reports.        Shy Pretty MD  7/15/2024  6:48 PM

## 2024-07-15 NOTE — TELEPHONE ENCOUNTER
Called [employer] spoke with Natalie @ # [phone] to set up LYFT transportation for patient's Maternal Fetal Medicine appointment.  Appointment scheduled on  July 17, 2024 at 11:00 AM at our [place].     Confirmed phone and address.   [phone]  [address] [facility] [address]    Left voicemail for patient confirming LYFT will pick-up at 10:20 AM for her Maternal Fetal Medicine appointment.   Patient instructed she has 5 minutes maximum to get into car upon arrival.  Patient instructed to call # [phone] any questions.

## 2024-07-15 NOTE — TELEPHONE ENCOUNTER
"36 weeks 0 days EDC 8/12/2024  Feeling lots of fetal movents all the time. Denies leaking of fluids, denies vaginal bleeding , no discharge. May have lost some mucous plug yesterday- yellowish clear mucous. C/o lower uterine pressure, vaginal pressure that radiates into her right hip that comes and goes, 5/10.  Patient is unsure if discomfort is contractions, states pain is not rhythmic, did not time anything. Patient reports she also has back pain but this has been ongoing for several weeks and is unchanged. While on the phone patient was huffing and puffing in pain.  Taking cold and warm showers and using Tylenol pain. Last Tylenol does 2 pm- pain level 5/10 at rest. Feels pressure when she empties her bladder, denies burning or pain on urination.  Able to eat and drink, no n/v. Tolerating fluids, does not feel dehydrated.   Advised due to her c/o pain she be evaluated in triage. Patient will go to [facility], waiting on friend to pick her up.    [facility] Dr. Solomon-Agree thank you   [facility] charge nurse    Reason for Disposition   MILD constant abdominal pain (e.g., doesn't interfere with normal activities) or tightening, and present > 2 hours    Answer Assessment - Initial Assessment Questions  1. LOCATION: \"Where does it hurt?\"       Vaginal and abdominal pain 5/10  2. RADIATION: \"Does the pain shoot anywhere else?\" (e.g., chest, back)      Right hip, uterus and stomach discomfort.  3. ONSET: \"When did the pain begin?\" (Minutes, hours or days ago)       Started today 11 am  4. ONSET: \"Gradual or sudden onset?\"      Gradual   5. PATTERN: \"Does the pain come and go, or has it been constant since it started?\"       Comes and goes   6. SEVERITY: \"How bad is the pain?\" \"What does it keep you from doing?\"  (e.g., Scale 1-10; mild, moderate, or severe)    - MILD (1-3): doesn't interfere with normal activities, abdomen soft and not tender to touch     - MODERATE (4-7): interferes with normal activities or awakens " "from sleep, tender to touch     - SEVERE (8-10): excruciating pain, doubled over, unable to do any normal activities      7-8/10, reduced to 5/10 with Tylenol  7. RECURRENT SYMPTOM: \"Have you ever had this type of stomach pain before?\" If Yes, ask: \"When was the last time?\" and \"What happened that time?\"       unsure  8. CAUSE: \"What do you think is causing the stomach pain?      unsure  9. RELIEVING/AGGRAVATING FACTORS: \"What makes it better or worse?\" (e.g., antacids, bowel movement, movement)      Walking and moving a lot   10. FETAL MOVEMENT: \"Has the baby's movement decreased or changed significantly from normal?\"        Baby is active  11. OTHER SYMPTOMS: \"Has there been any vaginal bleeding, fever, vomiting, diarrhea, or urine problems?\"        denies  12. JAIME: \"What date are you expecting to deliver?\"        8/12/24    Protocols used: Pregnancy - Abdominal Pain Greater Than 20 Weeks EGA-ADULT-[place]    "

## 2024-07-16 ENCOUNTER — TELEPHONE (OUTPATIENT)
Age: 24
End: 2024-07-16

## 2024-07-16 ENCOUNTER — HOSPITAL ENCOUNTER (OUTPATIENT)
Dept: INFUSION CENTER | Facility: HOSPITAL | Age: 24
Discharge: HOME | End: 2024-07-16
Attending: STUDENT IN AN ORGANIZED HEALTH CARE EDUCATION/TRAINING PROGRAM
Payer: COMMERCIAL

## 2024-07-16 VITALS
RESPIRATION RATE: 18 BRPM | HEART RATE: 87 BPM | OXYGEN SATURATION: 97 % | DIASTOLIC BLOOD PRESSURE: 57 MMHG | SYSTOLIC BLOOD PRESSURE: 116 MMHG | TEMPERATURE: 96.8 F

## 2024-07-16 DIAGNOSIS — B96.89 BACTERIAL VAGINOSIS: Primary | ICD-10-CM

## 2024-07-16 DIAGNOSIS — N76.0 BACTERIAL VAGINOSIS: Primary | ICD-10-CM

## 2024-07-16 DIAGNOSIS — D50.9 MATERNAL IRON DEFICIENCY ANEMIA AFFECTING PREGNANCY IN THIRD TRIMESTER, ANTEPARTUM: Primary | ICD-10-CM

## 2024-07-16 DIAGNOSIS — O99.013 MATERNAL IRON DEFICIENCY ANEMIA AFFECTING PREGNANCY IN THIRD TRIMESTER, ANTEPARTUM: Primary | ICD-10-CM

## 2024-07-16 RX ORDER — SODIUM CHLORIDE 9 MG/ML
20 INJECTION, SOLUTION INTRAVENOUS ONCE
OUTPATIENT
Start: 2024-07-23

## 2024-07-16 RX ORDER — SODIUM CHLORIDE 9 MG/ML
20 INJECTION, SOLUTION INTRAVENOUS ONCE
Status: COMPLETED | OUTPATIENT
Start: 2024-07-16 | End: 2024-07-16

## 2024-07-16 RX ORDER — METRONIDAZOLE 500 MG/1
500 TABLET ORAL EVERY 12 HOURS SCHEDULED
Qty: 14 TABLET | Refills: 0 | Status: SHIPPED | OUTPATIENT
Start: 2024-07-16 | End: 2024-07-23

## 2024-07-16 RX ADMIN — SODIUM CHLORIDE 20 ML/HR: 9 INJECTION, SOLUTION INTRAVENOUS at 13:22

## 2024-07-16 RX ADMIN — IRON SUCROSE 200 MG: 20 INJECTION, SOLUTION INTRAVENOUS at 13:21

## 2024-07-16 NOTE — PROGRESS NOTES
Nanette Camarillo  tolerated treatment well with no complications.      Nanette Camarillo is aware of future appt on 7/23 at 0955.     AVS printed and given to Nanette Camarillo:  No (Declined by Nanette Camarillo)

## 2024-07-16 NOTE — TELEPHONE ENCOUNTER
Spoke with patient who is 36w1d, .  She was seen at L&D yesterday, told she has BV, however prescription for Flagyl was discontinued.  She was calling to see if she needs it or why was it discontinued.  She currently denies any symptoms.    SEC to on call provider.  Provider resent prescription to pharmacy.  Pt made aware.  No further questions or concerns at this time.

## 2024-07-17 ENCOUNTER — ULTRASOUND (OUTPATIENT)
Facility: HOSPITAL | Age: 24
End: 2024-07-17
Payer: COMMERCIAL

## 2024-07-17 VITALS
WEIGHT: 254 LBS | HEART RATE: 91 BPM | HEIGHT: 63 IN | SYSTOLIC BLOOD PRESSURE: 110 MMHG | BODY MASS INDEX: 45 KG/M2 | DIASTOLIC BLOOD PRESSURE: 62 MMHG

## 2024-07-17 DIAGNOSIS — O99.213 SEVERE OBESITY DUE TO EXCESS CALORIES AFFECTING PREGNANCY IN THIRD TRIMESTER (HCC): ICD-10-CM

## 2024-07-17 DIAGNOSIS — Z3A.36 36 WEEKS GESTATION OF PREGNANCY: Primary | ICD-10-CM

## 2024-07-17 DIAGNOSIS — E66.01 SEVERE OBESITY DUE TO EXCESS CALORIES AFFECTING PREGNANCY IN THIRD TRIMESTER (HCC): ICD-10-CM

## 2024-07-17 PROCEDURE — 99213 OFFICE O/P EST LOW 20 MIN: CPT | Performed by: OBSTETRICS & GYNECOLOGY

## 2024-07-17 PROCEDURE — 76816 OB US FOLLOW-UP PER FETUS: CPT | Performed by: OBSTETRICS & GYNECOLOGY

## 2024-07-17 PROCEDURE — 59025 FETAL NON-STRESS TEST: CPT | Performed by: OBSTETRICS & GYNECOLOGY

## 2024-07-17 NOTE — PROGRESS NOTES
Repeat Non-Stress Testing:    Patient verbalizes +FM. Pt denies ALL:               Leaking of fluid   Contractions   Vaginal bleeding   Decreased fetal movement    Patient is performing daily kick counts. Patient has no questions or concerns.   NST strip reviewed by Dr. Dr. Hinds.

## 2024-07-18 ENCOUNTER — ROUTINE PRENATAL (OUTPATIENT)
Dept: OBGYN CLINIC | Facility: CLINIC | Age: 24
End: 2024-07-18

## 2024-07-18 ENCOUNTER — TELEPHONE (OUTPATIENT)
Dept: OBGYN CLINIC | Facility: CLINIC | Age: 24
End: 2024-07-18

## 2024-07-18 VITALS — BODY MASS INDEX: 45.24 KG/M2 | DIASTOLIC BLOOD PRESSURE: 62 MMHG | WEIGHT: 255.4 LBS | SYSTOLIC BLOOD PRESSURE: 100 MMHG

## 2024-07-18 DIAGNOSIS — Z3A.36 36 WEEKS GESTATION OF PREGNANCY: Primary | ICD-10-CM

## 2024-07-18 DIAGNOSIS — O24.419 GESTATIONAL DIABETES MELLITUS (GDM) IN THIRD TRIMESTER, GESTATIONAL DIABETES METHOD OF CONTROL UNSPECIFIED: ICD-10-CM

## 2024-07-18 DIAGNOSIS — Z34.93 PRENATAL CARE, THIRD TRIMESTER: ICD-10-CM

## 2024-07-18 PROCEDURE — PNV: Performed by: STUDENT IN AN ORGANIZED HEALTH CARE EDUCATION/TRAINING PROGRAM

## 2024-07-18 NOTE — PROGRESS NOTES
Nanette is a 24 y.o.  36w3d. Reports ++FM, no LOF, VB, or regular contractions.     Vitals:    24 1447   BP: 100/62     S=D  +FHTs    A/P:  Third tri labs notable for hgb 10.1  Rh status POS  Gbs collected today    TDAP vaccine--completed  Contraception: unsure, but no BTL  Breastfeeding: yes    Discussed pre-term labor precautions  Return to office in 1 weeks

## 2024-07-18 NOTE — Clinical Note
Nanette Camarillo is a 24 y.o.  with GDMA1 at 36w3d   Would like to schedule IOL 39 0/7 - 40 6/7 weeks for GDM A1 Would like soonest end of that spectrum Please call to confirm with her thanks! She will be AYESHA

## 2024-07-18 NOTE — TELEPHONE ENCOUNTER
Received: Today  MD Nanette Bailey Marquise is a 24 y.o.  with GDMA1 at 36w3d    Would like to schedule IOL 39 0/7 - 40 6/7 weeks for GDM A1  Would like soonest end of that spectrum  Please call to confirm with her thanks!  She will be AYESHA

## 2024-07-19 ENCOUNTER — TELEPHONE (OUTPATIENT)
Age: 24
End: 2024-07-19

## 2024-07-19 NOTE — TELEPHONE ENCOUNTER
Placed call to patient to review scheduling preferences, patient prefers to schedule 39w0d (8/5/24) or soonest available, no other preferences. Patient aware will work with L&D for scheduling and will call back to review IOL details once confirmed. Patient had no questions at this time.

## 2024-07-19 NOTE — TELEPHONE ENCOUNTER
Placed call to L&D, spoke to Neli. Patient is scheduled for IOL indicated for GDMA1, 8/5/24 at 8pm.  Provider notified via staff message. Induction calendar updated. Pink sticky updated.

## 2024-07-19 NOTE — TELEPHONE ENCOUNTER
Placed call to patient, reviewed/confirmed IOL details and instructions. Patient had no questions at this time.
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
## 2024-07-20 LAB — GP B STREP DNA SPEC QL NAA+PROBE: NEGATIVE

## 2024-07-25 ENCOUNTER — TELEPHONE (OUTPATIENT)
Dept: PERINATAL CARE | Facility: OTHER | Age: 24
End: 2024-07-25

## 2024-07-25 ENCOUNTER — ROUTINE PRENATAL (OUTPATIENT)
Dept: OBGYN CLINIC | Facility: CLINIC | Age: 24
End: 2024-07-25

## 2024-07-25 VITALS — WEIGHT: 254 LBS | SYSTOLIC BLOOD PRESSURE: 108 MMHG | DIASTOLIC BLOOD PRESSURE: 60 MMHG | BODY MASS INDEX: 44.99 KG/M2

## 2024-07-25 DIAGNOSIS — Z34.83 PRENATAL CARE, SUBSEQUENT PREGNANCY, THIRD TRIMESTER: Primary | ICD-10-CM

## 2024-07-25 PROCEDURE — PNV: Performed by: NURSE PRACTITIONER

## 2024-07-25 NOTE — PROGRESS NOTES
Nanette Camarillo is a 24 y.o.  at 37w3d. Reports +FM  She is doing well. Denies LOF/Bleeding +Cramping. Denies n/v/Ha. Trace edema B/L. Denies tobacco, drugs or ETOH use. Denies DV.     Visit Vitals  /60 (BP Location: Right arm, Cuff Size: Large)   Wt 115 kg (254 lb)   LMP 2023   BMI 44.99 kg/m²   OB Status Pregnant   Smoking Status Every Day   BSA 2.14 m²     Pre- Vitals      Flowsheet Row Most Recent Value   Prenatal Assessment    Fetal Heart Rate 145   Fundal Height (cm) 38 cm   Movement Present   Presentation Vertex   Prenatal Vitals    Blood Pressure 108/60   Weight - Scale 115 kg (254 lb)   Urine Albumin/Glucose    Dilation/Effacement/Station    Cervical Dilation 1   Cervical Effacement 50   Fetal Station -3   Vaginal Drainage    Edema             Urine neg/neg      Diagnoses and all orders for this visit:    Prenatal care, subsequent pregnancy, third trimester      Continue [facility].     Watch for S/S    RTO in 1week for PNV or sooner if needed.
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
## 2024-07-26 ENCOUNTER — TELEPHONE (OUTPATIENT)
Facility: HOSPITAL | Age: 24
End: 2024-07-26

## 2024-07-26 ENCOUNTER — ULTRASOUND (OUTPATIENT)
Dept: PERINATAL CARE | Facility: OTHER | Age: 24
End: 2024-07-26
Payer: COMMERCIAL

## 2024-07-26 VITALS
SYSTOLIC BLOOD PRESSURE: 110 MMHG | HEART RATE: 99 BPM | WEIGHT: 256.2 LBS | DIASTOLIC BLOOD PRESSURE: 60 MMHG | BODY MASS INDEX: 45.39 KG/M2 | HEIGHT: 63 IN

## 2024-07-26 DIAGNOSIS — E66.01 MATERNAL MORBID OBESITY IN THIRD TRIMESTER, ANTEPARTUM (HCC): Primary | ICD-10-CM

## 2024-07-26 DIAGNOSIS — Z3A.37 37 WEEKS GESTATION OF PREGNANCY: ICD-10-CM

## 2024-07-26 DIAGNOSIS — O99.213 MATERNAL MORBID OBESITY IN THIRD TRIMESTER, ANTEPARTUM (HCC): Primary | ICD-10-CM

## 2024-07-26 PROCEDURE — 59025 FETAL NON-STRESS TEST: CPT | Performed by: OBSTETRICS & GYNECOLOGY

## 2024-07-26 PROCEDURE — 76815 OB US LIMITED FETUS(S): CPT | Performed by: OBSTETRICS & GYNECOLOGY

## 2024-07-26 NOTE — TELEPHONE ENCOUNTER
Called [employer] spoke with Carter @ # [phone] to set up LYFT transportation for patient's Maternal Fetal Medicine appointment.  Appointment scheduled on  July 26, 2024 at 3:15 PM at our [place].       Spoke with patient and explained LYFT will pick-up patient at 2:15 PM for Maternal Fetal Medicine appointment.   Patient instructed she has 5 minutes maximum to get into car upon arrival. Patient verbalized understanding.     Confirmed phone and address.   [phone]  [address] [place] [id number]

## 2024-07-27 NOTE — PROGRESS NOTES
Please refer to the [facility] ultrasound report in Ob Procedures for additional information regarding today's visit

## 2024-07-29 ENCOUNTER — TELEPHONE (OUTPATIENT)
Facility: HOSPITAL | Age: 24
End: 2024-07-29

## 2024-07-29 ENCOUNTER — ULTRASOUND (OUTPATIENT)
Facility: HOSPITAL | Age: 24
End: 2024-07-29
Payer: COMMERCIAL

## 2024-07-29 VITALS
HEIGHT: 63 IN | SYSTOLIC BLOOD PRESSURE: 104 MMHG | DIASTOLIC BLOOD PRESSURE: 68 MMHG | WEIGHT: 259 LBS | BODY MASS INDEX: 45.89 KG/M2 | HEART RATE: 87 BPM

## 2024-07-29 DIAGNOSIS — E66.01 MATERNAL MORBID OBESITY IN THIRD TRIMESTER, ANTEPARTUM (HCC): ICD-10-CM

## 2024-07-29 DIAGNOSIS — O99.213 MATERNAL MORBID OBESITY IN THIRD TRIMESTER, ANTEPARTUM (HCC): ICD-10-CM

## 2024-07-29 DIAGNOSIS — Z3A.38 38 WEEKS GESTATION OF PREGNANCY: Primary | ICD-10-CM

## 2024-07-29 PROCEDURE — 59025 FETAL NON-STRESS TEST: CPT | Performed by: OBSTETRICS & GYNECOLOGY

## 2024-07-29 PROCEDURE — 76815 OB US LIMITED FETUS(S): CPT | Performed by: OBSTETRICS & GYNECOLOGY

## 2024-07-29 NOTE — TELEPHONE ENCOUNTER
Called [employer] spoke with Natalie @ # [phone] to set up LYFT transportation for patient's Maternal Fetal Medicine appointment.  Appointment scheduled on  July 29, 2024 at 3:15 PM at [facility].     Confirmed phone and address.   [phone]  [address] [facility] [address]    Left voicemail for patient confirming LYFT will pick-up at 2:40 PM for her Maternal Fetal Medicine appointment.   Patient instructed she has 5 minutes maximum to get into car upon arrival.  Patient instructed to call # [phone] any questions.

## 2024-07-29 NOTE — LETTER
July 29, 2024     Shy Pretty MD  [address] [place] [address]    Patient: Nanette Camarillo   YOB: 2000   Date of Visit: 7/29/2024       Dear Dr. Pretty:    Thank you for referring Nanette Camarillo to me for evaluation. Below are my notes for this consultation.    If you have questions, please do not hesitate to call me. I look forward to following your patient along with you.         Sincerely,        Arash Ding MD        CC: No Recipients    Arash Ding MD  7/29/2024  3:42 PM  Sign when Signing Visit  Please refer to the [facility] ultrasound report in Ob Procedures for additional information regarding today's visit

## 2024-07-31 ENCOUNTER — TELEPHONE (OUTPATIENT)
Dept: OBGYN CLINIC | Facility: CLINIC | Age: 24
End: 2024-07-31

## 2024-07-31 NOTE — TELEPHONE ENCOUNTER
Called pt and pt said she had no way to get here.  Her next appt is on Monday 8/5 and LYFT will be set up.

## 2024-08-01 ENCOUNTER — NURSE TRIAGE (OUTPATIENT)
Dept: OTHER | Facility: OTHER | Age: 24
End: 2024-08-01

## 2024-08-01 ENCOUNTER — HOSPITAL ENCOUNTER (OUTPATIENT)
Facility: HOSPITAL | Age: 24
Discharge: HOME | End: 2024-08-02
Attending: STUDENT IN AN ORGANIZED HEALTH CARE EDUCATION/TRAINING PROGRAM | Admitting: STUDENT IN AN ORGANIZED HEALTH CARE EDUCATION/TRAINING PROGRAM
Payer: COMMERCIAL

## 2024-08-01 DIAGNOSIS — Z3A.38 38 WEEKS GESTATION OF PREGNANCY: Primary | ICD-10-CM

## 2024-08-01 DIAGNOSIS — O99.891 BACK PAIN AFFECTING PREGNANCY: ICD-10-CM

## 2024-08-01 DIAGNOSIS — M54.9 BACK PAIN AFFECTING PREGNANCY: ICD-10-CM

## 2024-08-01 LAB
ERYTHROCYTE [DISTWIDTH] IN BLOOD BY AUTOMATED COUNT: 15.5 % (ref 11.6–15.1)
HCT VFR BLD AUTO: 35.1 % (ref 34.8–46.1)
HGB BLD-MCNC: 11.2 G/DL (ref 11.5–15.4)
MCH RBC QN AUTO: 28.6 PG (ref 26.8–34.3)
MCHC RBC AUTO-ENTMCNC: 31.9 G/DL (ref 31.4–37.4)
MCV RBC AUTO: 90 FL (ref 82–98)
PLATELET # BLD AUTO: 184 THOUSANDS/UL (ref 149–390)
PMV BLD AUTO: 10.6 FL (ref 8.9–12.7)
RBC # BLD AUTO: 3.91 MILLION/UL (ref 3.81–5.12)
WBC # BLD AUTO: 7.31 THOUSAND/UL (ref 4.31–10.16)

## 2024-08-01 PROCEDURE — 82570 ASSAY OF URINE CREATININE: CPT

## 2024-08-01 PROCEDURE — 85027 COMPLETE CBC AUTOMATED: CPT

## 2024-08-01 PROCEDURE — 84156 ASSAY OF PROTEIN URINE: CPT

## 2024-08-01 PROCEDURE — 80053 COMPREHEN METABOLIC PANEL: CPT

## 2024-08-01 NOTE — TELEPHONE ENCOUNTER
"[id number] pregnant / Swollen feet / Cramping  ----- Message from Kathleen GRANADO sent at 2024  7:24 PM EDT -----  \"I am 38 wks and 3 days pregnant and my feet are swollen. I am also experiencing some cramping.\"    "

## 2024-08-01 NOTE — TELEPHONE ENCOUNTER
"Reason for Disposition  • [1] History of prior delivery (multipara) AND [2] contractions > 10 minutes, or for < 1 hour    Answer Assessment - Initial Assessment Questions  1. ONSET: \"When did the symptoms begin?\"        Regards to ankle and feet swelling, has been ongoing     During call, patient had at least two contractions, within 5 min and lasting 1 min and 30 sec    2. CONTRACTIONS: \"Describe the contractions that you are having.\" (e.g., duration, frequency, regularity, severity)      Inconsistent    See above    3. JAIME: \"What date are you expecting to deliver?\"      2024    4. PARITY: \"Have you had a baby before?\" If Yes, ask: \"How long did the labor last?\"          5. FETAL MOVEMENT: \"Has the baby's movement decreased or changed significantly from normal?\"      No change to FM; very active    6. OTHER SYMPTOMS: \"Do you have any other symptoms?\" (e.g., leaking fluid from vagina, vaginal bleeding, fever, hand/facial swelling)      Just persistent, moderate-severe swelling to both ankles and feet. Denies chest pain, shortness of breath, vaginal bleeding or discharge.    Protocols used: Pregnancy - Labor-ADULT-AH    "

## 2024-08-02 VITALS
OXYGEN SATURATION: 97 % | HEART RATE: 65 BPM | DIASTOLIC BLOOD PRESSURE: 71 MMHG | SYSTOLIC BLOOD PRESSURE: 126 MMHG | TEMPERATURE: 98.4 F | RESPIRATION RATE: 20 BRPM

## 2024-08-02 PROBLEM — Z3A.38 38 WEEKS GESTATION OF PREGNANCY: Status: ACTIVE | Noted: 2024-07-15

## 2024-08-02 LAB
ALBUMIN SERPL BCG-MCNC: 3.6 G/DL (ref 3.5–5)
ALP SERPL-CCNC: 95 U/L (ref 34–104)
ALT SERPL W P-5'-P-CCNC: 6 U/L (ref 7–52)
ANION GAP SERPL CALCULATED.3IONS-SCNC: 9 MMOL/L (ref 4–13)
AST SERPL W P-5'-P-CCNC: 13 U/L (ref 13–39)
BILIRUB SERPL-MCNC: 0.3 MG/DL (ref 0.2–1)
BUN SERPL-MCNC: 5 MG/DL (ref 5–25)
CALCIUM SERPL-MCNC: 9.1 MG/DL (ref 8.4–10.2)
CHLORIDE SERPL-SCNC: 107 MMOL/L (ref 96–108)
CO2 SERPL-SCNC: 21 MMOL/L (ref 21–32)
CREAT SERPL-MCNC: 0.52 MG/DL (ref 0.6–1.3)
CREAT UR-MCNC: 282.7 MG/DL
GFR SERPL CREATININE-BSD FRML MDRD: 133 ML/MIN/1.73SQ M
GLUCOSE P FAST SERPL-MCNC: 83 MG/DL (ref 65–99)
GLUCOSE SERPL-MCNC: 83 MG/DL (ref 65–140)
POTASSIUM SERPL-SCNC: 3.4 MMOL/L (ref 3.5–5.3)
PROT SERPL-MCNC: 6.5 G/DL (ref 6.4–8.4)
PROT UR-MCNC: 45 MG/DL
PROT/CREAT UR: 0.16 MG/G{CREAT} (ref 0–0.1)
SODIUM SERPL-SCNC: 137 MMOL/L (ref 135–147)

## 2024-08-02 PROCEDURE — 99214 OFFICE O/P EST MOD 30 MIN: CPT

## 2024-08-02 PROCEDURE — NC001 PR NO CHARGE: Performed by: STUDENT IN AN ORGANIZED HEALTH CARE EDUCATION/TRAINING PROGRAM

## 2024-08-02 RX ORDER — LIDOCAINE 4 G/G
1 PATCH TOPICAL DAILY
Qty: 5 PATCH | Refills: 0 | Status: SHIPPED | OUTPATIENT
Start: 2024-08-02

## 2024-08-02 NOTE — TELEPHONE ENCOUNTER
"Reason for Disposition  • [1] History of prior delivery (multipara) AND [2] contractions < 10 minutes apart AND [3] present 1 hour    Answer Assessment - Initial Assessment Questions  1. ONSET: \"When did the symptoms begin?\"         A little over an hour ago    2. CONTRACTIONS: \"Describe the contractions that you are having.\" (e.g., duration, frequency, regularity, severity)      Patient reports unable to time as they are occurring very frequently now, compared to previous triage call. Unsure, but patient states she think at least every min and unsure how long they are lasting    3. JAIME: \"What date are you expecting to deliver?\"      Scheduled to be induced on 2024    4. PARITY: \"Have you had a baby before?\" If Yes, ask: \"How long did the labor last?\"          5. FETAL MOVEMENT: \"Has the baby's movement decreased or changed significantly from normal?\"      No changes to FM; baby is still very active    6. OTHER SYMPTOMS: \"Do you have any other symptoms?\" (e.g., leaking fluid from vagina, vaginal bleeding, fever, hand/facial swelling)      No other symptoms noted    Protocols used: Pregnancy - Labor-ADULT-AH    "

## 2024-08-02 NOTE — TELEPHONE ENCOUNTER
Care advice given. Patient verbalized understanding. Both provider on call and charge RN notified.

## 2024-08-02 NOTE — TELEPHONE ENCOUNTER
"Regarding: contractions  ----- Message from Lolly GRANADO sent at 8/1/2024  8:44 PM EDT -----  \"I was told to call back in 30 minutes if my cramps are getting worse and they are\"    "

## 2024-08-02 NOTE — PROGRESS NOTES
Triage Note - OB  Nanette Camarillo 24 y.o. female MRN: [id number]  Unit/Bed#:  TRIAGE 4 Encounter: [id number]    OB TRIAGE NOTE  Nanette Camarillo  [id number]  2024  12:36 AM  LD TRIAGE 4/LD TRIAGE 4-*    ASSESS:  24 y.o.  38w4d with contractions, not in labor at this time.    PLAN  #1. R/o labor  SVE: 1/10/-3  FHT reactive    #2. R/o Pre-E  /84, first elevated BP this pregnancy  CBC, CMP, P/Cr wnl  Scheduled for IOL on 24    #3. Discharge instructions  Patient instructed to call if experiencing worsening contractions, vaginal bleeding, loss of fluid or decreased fetal movement.  Will follow up with OBGYN on 24.    D/w Dr. Pretty  ______________    SUBJECTIVE    JAIME: Estimated Date of Delivery: 24    HPI Chronology:  24 y.o.  38w4d presents with complaint of contractions, she has not tried anything for pain at home. She denies headache, chest pain, SOB, RUQ pain or new onset swelling.    Contractions: yes  Leakage: no  Bleeding: no  Fetal Movement: yes  Pelvic pain: no    Vitals:   /71   Pulse 65   Temp 98.4 °F (36.9 °C) (Oral)   Resp 20   LMP 2023   SpO2 97%   There is no height or weight on file to calculate BMI.    Review of Systems   Constitutional:  Negative for chills and fever.   Eyes:  Negative for visual disturbance.   Respiratory:  Negative for chest tightness and shortness of breath.    Cardiovascular:  Negative for chest pain and palpitations.   Gastrointestinal:  Positive for abdominal pain.   Genitourinary:  Negative for vaginal bleeding, vaginal discharge and vaginal pain.   Musculoskeletal:  Positive for back pain.   Neurological:  Negative for headaches.       Physical Exam  HENT:      Head: Normocephalic.      Mouth/Throat:      Pharynx: Oropharynx is clear.   Eyes:      Conjunctiva/sclera: Conjunctivae normal.   Cardiovascular:      Rate and Rhythm: Normal rate.      Pulses: Normal pulses.   Pulmonary:      Effort: Pulmonary effort is  normal.   Abdominal:      Palpations: Abdomen is soft.      Tenderness: There is no abdominal tenderness.   Skin:     General: Skin is warm.      Capillary Refill: Capillary refill takes less than 2 seconds.   Neurological:      Mental Status: She is alert and oriented to person, place, and time.   Psychiatric:         Mood and Affect: Mood normal.         FHT:  Baseline Rate (FHR): 125 bpm  Variability: Moderate  Accelerations: 15 x 15 or greater, At variable times  Decelerations: None  FHR Category: Category I  TOCO:   Contraction Frequency (minutes): 1-5  Contraction Duration (seconds):   Contraction Intensity: Mild    Labs:   Recent Results (from the past 24 hour(s))   CBC and Platelet    Collection Time: 08/01/24 11:41 PM   Result Value Ref Range    WBC 7.31 4.31 - 10.16 Thousand/uL    RBC 3.91 3.81 - 5.12 Million/uL    Hemoglobin 11.2 (L) 11.5 - 15.4 g/dL    Hematocrit 35.1 34.8 - 46.1 %    MCV 90 82 - 98 fL    MCH 28.6 26.8 - 34.3 pg    MCHC 31.9 31.4 - 37.4 g/dL    RDW 15.5 (H) 11.6 - 15.1 %    Platelets 184 149 - 390 Thousands/uL    MPV 10.6 8.9 - 12.7 fL   Comprehensive metabolic panel    Collection Time: 08/01/24 11:41 PM   Result Value Ref Range    Sodium 137 135 - 147 mmol/L    Potassium 3.4 (L) 3.5 - 5.3 mmol/L    Chloride 107 96 - 108 mmol/L    CO2 21 21 - 32 mmol/L    ANION GAP 9 4 - 13 mmol/L    BUN 5 5 - 25 mg/dL    Creatinine 0.52 (L) 0.60 - 1.30 mg/dL    Glucose 83 65 - 140 mg/dL    Glucose, Fasting 83 65 - 99 mg/dL    Calcium 9.1 8.4 - 10.2 mg/dL    AST 13 13 - 39 U/L    ALT 6 (L) 7 - 52 U/L    Alkaline Phosphatase 95 34 - 104 U/L    Total Protein 6.5 6.4 - 8.4 g/dL    Albumin 3.6 3.5 - 5.0 g/dL    Total Bilirubin 0.30 0.20 - 1.00 mg/dL    eGFR 133 ml/min/1.73sq m   Protein / creatinine ratio, urine    Collection Time: 08/01/24 11:41 PM   Result Value Ref Range    Creatinine, Ur 282.7 Reference range not established. mg/dL    Protein Urine Random 45 Reference range not established. mg/dL     Prot/Creat Ratio, Ur 0.16 (H) 0.00 - 0.10       Lab, Imaging and other studies: I have personally reviewed pertinent reports.      Cyrus Mcnulty MD  8/2/2024  12:36 AM

## 2024-08-02 NOTE — TELEPHONE ENCOUNTER
Upon chart review, patient seen and discharged from L&D, with f/u in office as previously scheduled 8/5/24.

## 2024-08-05 ENCOUNTER — APPOINTMENT (OUTPATIENT)
Dept: LABOR AND DELIVERY | Facility: HOSPITAL | Age: 24
DRG: 560 | End: 2024-08-05
Payer: COMMERCIAL

## 2024-08-05 ENCOUNTER — ANESTHESIA (INPATIENT)
Dept: ANESTHESIOLOGY | Facility: HOSPITAL | Age: 24
DRG: 560 | End: 2024-08-05
Payer: COMMERCIAL

## 2024-08-05 ENCOUNTER — ANESTHESIA EVENT (INPATIENT)
Dept: ANESTHESIOLOGY | Facility: HOSPITAL | Age: 24
DRG: 560 | End: 2024-08-05
Payer: COMMERCIAL

## 2024-08-05 ENCOUNTER — HOSPITAL ENCOUNTER (INPATIENT)
Facility: HOSPITAL | Age: 24
LOS: 2 days | Discharge: HOME | DRG: 560 | End: 2024-08-07
Attending: OBSTETRICS & GYNECOLOGY | Admitting: OBSTETRICS & GYNECOLOGY
Payer: COMMERCIAL

## 2024-08-05 DIAGNOSIS — M54.9 BACK PAIN AFFECTING PREGNANCY: ICD-10-CM

## 2024-08-05 DIAGNOSIS — O99.891 BACK PAIN AFFECTING PREGNANCY: ICD-10-CM

## 2024-08-05 DIAGNOSIS — O34.219 VBAC (VAGINAL BIRTH AFTER CESAREAN): Primary | ICD-10-CM

## 2024-08-05 PROBLEM — Z34.90 PREGNANCY: Status: ACTIVE | Noted: 2024-07-15

## 2024-08-05 PROBLEM — Z3A.39 39 WEEKS GESTATION OF PREGNANCY: Status: ACTIVE | Noted: 2024-07-15

## 2024-08-05 LAB
ABO GROUP BLD: NORMAL
BLD GP AB SCN SERPL QL: NEGATIVE
ERYTHROCYTE [DISTWIDTH] IN BLOOD BY AUTOMATED COUNT: 15.4 % (ref 11.6–15.1)
GLUCOSE SERPL-MCNC: 77 MG/DL (ref 65–140)
HCT VFR BLD AUTO: 36.6 % (ref 34.8–46.1)
HGB BLD-MCNC: 11.8 G/DL (ref 11.5–15.4)
HOLD SPECIMEN: NORMAL
MCH RBC QN AUTO: 28.6 PG (ref 26.8–34.3)
MCHC RBC AUTO-ENTMCNC: 32.2 G/DL (ref 31.4–37.4)
MCV RBC AUTO: 89 FL (ref 82–98)
PLATELET # BLD AUTO: 187 THOUSANDS/UL (ref 149–390)
PMV BLD AUTO: 10.7 FL (ref 8.9–12.7)
RBC # BLD AUTO: 4.12 MILLION/UL (ref 3.81–5.12)
RH BLD: POSITIVE
SPECIMEN EXPIRATION DATE: NORMAL
WBC # BLD AUTO: 8.92 THOUSAND/UL (ref 4.31–10.16)

## 2024-08-05 PROCEDURE — 82948 REAGENT STRIP/BLOOD GLUCOSE: CPT

## 2024-08-05 PROCEDURE — 86850 RBC ANTIBODY SCREEN: CPT

## 2024-08-05 PROCEDURE — 3E0R3GC INTRODUCTION OF OTHER THERAPEUTIC SUBSTANCE INTO SPINAL CANAL, PERCUTANEOUS APPROACH: ICD-10-PCS | Performed by: STUDENT IN AN ORGANIZED HEALTH CARE EDUCATION/TRAINING PROGRAM

## 2024-08-05 PROCEDURE — NC001 PR NO CHARGE: Performed by: OBSTETRICS & GYNECOLOGY

## 2024-08-05 PROCEDURE — 4A1HXCZ MONITORING OF PRODUCTS OF CONCEPTION, CARDIAC RATE, EXTERNAL APPROACH: ICD-10-PCS | Performed by: STUDENT IN AN ORGANIZED HEALTH CARE EDUCATION/TRAINING PROGRAM

## 2024-08-05 PROCEDURE — 86900 BLOOD TYPING SEROLOGIC ABO: CPT

## 2024-08-05 PROCEDURE — 86901 BLOOD TYPING SEROLOGIC RH(D): CPT

## 2024-08-05 PROCEDURE — 85027 COMPLETE CBC AUTOMATED: CPT

## 2024-08-05 PROCEDURE — 86780 TREPONEMA PALLIDUM: CPT

## 2024-08-05 RX ORDER — SODIUM CHLORIDE 9 MG/ML
125 INJECTION, SOLUTION INTRAVENOUS CONTINUOUS
Status: DISCONTINUED | OUTPATIENT
Start: 2024-08-05 | End: 2024-08-06

## 2024-08-05 RX ORDER — BUPIVACAINE HYDROCHLORIDE 2.5 MG/ML
30 INJECTION, SOLUTION EPIDURAL; INFILTRATION; INTRACAUDAL ONCE AS NEEDED
Status: DISCONTINUED | OUTPATIENT
Start: 2024-08-05 | End: 2024-08-06

## 2024-08-05 RX ORDER — OXYTOCIN/RINGER'S LACTATE 30/500 ML
1-30 PLASTIC BAG, INJECTION (ML) INTRAVENOUS
Status: DISCONTINUED | OUTPATIENT
Start: 2024-08-05 | End: 2024-08-06

## 2024-08-05 RX ADMIN — OXYTOCIN 2 MILLI-UNITS/MIN: 10 INJECTION INTRAVENOUS at 23:00

## 2024-08-05 RX ADMIN — SODIUM CHLORIDE 125 ML/HR: 0.9 INJECTION, SOLUTION INTRAVENOUS at 21:45

## 2024-08-06 PROBLEM — O34.219 VBAC (VAGINAL BIRTH AFTER CESAREAN): Status: ACTIVE | Noted: 2024-07-15

## 2024-08-06 LAB
BASE EXCESS BLDCOV CALC-SCNC: -3.6 MMOL/L (ref 1–9)
GLUCOSE SERPL-MCNC: 100 MG/DL (ref 65–140)
GLUCOSE SERPL-MCNC: 110 MG/DL (ref 65–140)
GLUCOSE SERPL-MCNC: 75 MG/DL (ref 65–140)
GLUCOSE SERPL-MCNC: 77 MG/DL (ref 65–140)
GLUCOSE SERPL-MCNC: 78 MG/DL (ref 65–140)
GLUCOSE SERPL-MCNC: 80 MG/DL (ref 65–140)
GLUCOSE SERPL-MCNC: 92 MG/DL (ref 65–140)
GLUCOSE SERPL-MCNC: 96 MG/DL (ref 65–140)
HCO3 BLDCOV-SCNC: 20.6 MMOL/L (ref 12.2–28.6)
OXYHGB MFR BLDCOV: 84.8 %
PCO2 BLDCOV: 35.1 MM HG (ref 27–43)
PH BLDCOV: 7.39 [PH] (ref 7.19–7.49)
PO2 BLDCOV: 40.1 MM HG (ref 15–45)
SAO2 % BLDCOV: 20.3 ML/DL
TREPONEMA PALLIDUM IGG+IGM AB [PRESENCE] IN SERUM OR PLASMA BY IMMUNOASSAY: NORMAL

## 2024-08-06 PROCEDURE — 82805 BLOOD GASES W/O2 SATURATION: CPT | Performed by: OBSTETRICS & GYNECOLOGY

## 2024-08-06 PROCEDURE — 82948 REAGENT STRIP/BLOOD GLUCOSE: CPT

## 2024-08-06 RX ORDER — OXYTOCIN/RINGER'S LACTATE 30/500 ML
250 PLASTIC BAG, INJECTION (ML) INTRAVENOUS ONCE
Status: COMPLETED | OUTPATIENT
Start: 2024-08-06 | End: 2024-08-06

## 2024-08-06 RX ORDER — BUTORPHANOL TARTRATE 1 MG/ML
1 INJECTION, SOLUTION INTRAMUSCULAR; INTRAVENOUS
Status: DISCONTINUED | OUTPATIENT
Start: 2024-08-06 | End: 2024-08-06

## 2024-08-06 RX ORDER — IBUPROFEN 600 MG/1
600 TABLET ORAL EVERY 6 HOURS PRN
Status: DISCONTINUED | OUTPATIENT
Start: 2024-08-06 | End: 2024-08-07 | Stop reason: HOSPADM

## 2024-08-06 RX ORDER — DIPHENHYDRAMINE HCL 25 MG
25 TABLET ORAL EVERY 6 HOURS PRN
Status: DISCONTINUED | OUTPATIENT
Start: 2024-08-06 | End: 2024-08-07 | Stop reason: HOSPADM

## 2024-08-06 RX ORDER — BUTORPHANOL TARTRATE 1 MG/ML
1 INJECTION, SOLUTION INTRAMUSCULAR; INTRAVENOUS ONCE
Status: COMPLETED | OUTPATIENT
Start: 2024-08-06 | End: 2024-08-06

## 2024-08-06 RX ORDER — CALCIUM CARBONATE 500 MG/1
1000 TABLET, CHEWABLE ORAL DAILY PRN
Status: DISCONTINUED | OUTPATIENT
Start: 2024-08-06 | End: 2024-08-07 | Stop reason: HOSPADM

## 2024-08-06 RX ORDER — BENZOCAINE/MENTHOL 6 MG-10 MG
1 LOZENGE MUCOUS MEMBRANE DAILY PRN
Status: DISCONTINUED | OUTPATIENT
Start: 2024-08-06 | End: 2024-08-07 | Stop reason: HOSPADM

## 2024-08-06 RX ORDER — SIMETHICONE 80 MG
80 TABLET,CHEWABLE ORAL 4 TIMES DAILY PRN
Status: DISCONTINUED | OUTPATIENT
Start: 2024-08-06 | End: 2024-08-07 | Stop reason: HOSPADM

## 2024-08-06 RX ORDER — LIDOCAINE HYDROCHLORIDE AND EPINEPHRINE 15; 5 MG/ML; UG/ML
INJECTION, SOLUTION EPIDURAL
Status: COMPLETED | OUTPATIENT
Start: 2024-08-06 | End: 2024-08-06

## 2024-08-06 RX ORDER — ONDANSETRON 2 MG/ML
4 INJECTION INTRAMUSCULAR; INTRAVENOUS EVERY 8 HOURS PRN
Status: DISCONTINUED | OUTPATIENT
Start: 2024-08-06 | End: 2024-08-07 | Stop reason: HOSPADM

## 2024-08-06 RX ORDER — DOCUSATE SODIUM 100 MG/1
100 CAPSULE, LIQUID FILLED ORAL 2 TIMES DAILY
Status: DISCONTINUED | OUTPATIENT
Start: 2024-08-06 | End: 2024-08-07 | Stop reason: HOSPADM

## 2024-08-06 RX ORDER — ROPIVACAINE HYDROCHLORIDE 2 MG/ML
INJECTION, SOLUTION EPIDURAL; INFILTRATION; PERINEURAL AS NEEDED
Status: DISCONTINUED | OUTPATIENT
Start: 2024-08-06 | End: 2024-08-07 | Stop reason: HOSPADM

## 2024-08-06 RX ORDER — ACETAMINOPHEN 325 MG/1
975 TABLET ORAL EVERY 6 HOURS PRN
Status: DISCONTINUED | OUTPATIENT
Start: 2024-08-06 | End: 2024-08-07 | Stop reason: HOSPADM

## 2024-08-06 RX ORDER — METOCLOPRAMIDE HYDROCHLORIDE 5 MG/ML
10 INJECTION INTRAMUSCULAR; INTRAVENOUS EVERY 6 HOURS PRN
Status: DISCONTINUED | OUTPATIENT
Start: 2024-08-06 | End: 2024-08-07 | Stop reason: HOSPADM

## 2024-08-06 RX ORDER — DIPHENHYDRAMINE HYDROCHLORIDE 50 MG/ML
25 INJECTION INTRAMUSCULAR; INTRAVENOUS EVERY 6 HOURS PRN
Status: DISCONTINUED | OUTPATIENT
Start: 2024-08-06 | End: 2024-08-06

## 2024-08-06 RX ADMIN — IBUPROFEN 600 MG: 600 TABLET ORAL at 21:28

## 2024-08-06 RX ADMIN — DOCUSATE SODIUM 100 MG: 100 CAPSULE, LIQUID FILLED ORAL at 19:35

## 2024-08-06 RX ADMIN — SODIUM CHLORIDE 125 ML/HR: 0.9 INJECTION, SOLUTION INTRAVENOUS at 04:02

## 2024-08-06 RX ADMIN — BUTORPHANOL TARTRATE 1 MG: 1 INJECTION, SOLUTION INTRAMUSCULAR; INTRAVENOUS at 08:45

## 2024-08-06 RX ADMIN — ROPIVACAINE HYDROCHLORIDE 5 ML: 2 INJECTION EPIDURAL; INFILTRATION; PERINEURAL at 11:25

## 2024-08-06 RX ADMIN — DIPHENHYDRAMINE HYDROCHLORIDE 25 MG: 50 INJECTION, SOLUTION INTRAMUSCULAR; INTRAVENOUS at 03:14

## 2024-08-06 RX ADMIN — LIDOCAINE HYDROCHLORIDE AND EPINEPHRINE 3 ML: 15; 5 INJECTION, SOLUTION EPIDURAL at 11:22

## 2024-08-06 RX ADMIN — OXYTOCIN 250 MILLI-UNITS/MIN: 10 INJECTION INTRAVENOUS at 15:29

## 2024-08-06 RX ADMIN — SODIUM CHLORIDE 125 ML/HR: 0.9 INJECTION, SOLUTION INTRAVENOUS at 12:07

## 2024-08-06 RX ADMIN — ROPIVACAINE HYDROCHLORIDE: 2 INJECTION, SOLUTION EPIDURAL; INFILTRATION at 11:30

## 2024-08-06 RX ADMIN — ACETAMINOPHEN 975 MG: 325 TABLET, FILM COATED ORAL at 19:35

## 2024-08-06 RX ADMIN — HYDROCORTISONE 1 APPLICATION: 1 CREAM TOPICAL at 20:37

## 2024-08-06 NOTE — PLAN OF CARE
Problem: BIRTH - VAGINAL/ SECTION  Goal: Fetal and maternal status remain reassuring during the birth process  Description: INTERVENTIONS:  - Monitor vital signs  - Monitor fetal heart rate  - Monitor uterine activity  - Monitor labor progression (vaginal delivery)  - DVT prophylaxis  - Antibiotic prophylaxis  Outcome: Progressing  Goal: Emotionally satisfying birthing experience for mother/fetus  Description: Interventions:  - Assess, plan, implement and evaluate the nursing care given to the patient in labor  - Advocate the philosophy that each childbirth experience is a unique experience and support the family's chosen level of involvement and control during the labor process   - Actively participate in both the patient's and family's teaching of the birth process  - Consider cultural, Baptist and age-specific factors and plan care for the patient in labor  Outcome: Progressing     Problem: PAIN - ADULT  Goal: Verbalizes/displays adequate comfort level or baseline comfort level  Description: Interventions:  - Encourage patient to monitor pain and request assistance  - Assess pain using appropriate pain scale  - Administer analgesics based on type and severity of pain and evaluate response  - Implement non-pharmacological measures as appropriate and evaluate response  - Consider cultural and social influences on pain and pain management  - Notify physician/advanced practitioner if interventions unsuccessful or patient reports new pain  Outcome: Progressing     Problem: INFECTION - ADULT  Goal: Absence or prevention of progression during hospitalization  Description: INTERVENTIONS:  - Assess and monitor for signs and symptoms of infection  - Monitor lab/diagnostic results  - Monitor all insertion sites, i.e. indwelling lines, tubes, and drains  - Monitor endotracheal if appropriate and nasal secretions for changes in amount and color  - [place] appropriate cooling/warming therapies per order  -  Administer medications as ordered  - Instruct and encourage patient and family to use good hand hygiene technique  - Identify and instruct in appropriate isolation precautions for identified infection/condition  Outcome: Progressing  Goal: Absence of fever/infection during neutropenic period  Description: INTERVENTIONS:  - Monitor WBC    Outcome: Progressing     Problem: SAFETY ADULT  Goal: Patient will remain free of falls  Description: INTERVENTIONS:  - Educate patient/family on patient safety including physical limitations  - Instruct patient to call for assistance with activity   - Consult OT/PT to assist with strengthening/mobility   - Keep Call bell within reach  - Keep bed low and locked with side rails adjusted as appropriate  - Keep care items and personal belongings within reach  - Initiate and maintain comfort rounds  - Make Fall Risk Sign visible to staff  - Offer Toileting every  Hours, in advance of need  - Initiate/Maintain alarm  - Obtain necessary fall risk management equipment:   - Apply yellow socks and bracelet for high fall risk patients  - Consider moving patient to room near nurses station  Outcome: Progressing  Goal: Maintain or return to baseline ADL function  Description: INTERVENTIONS:  -  Assess patient's ability to carry out ADLs; assess patient's baseline for ADL function and identify physical deficits which impact ability to perform ADLs (bathing, care of mouth/teeth, toileting, grooming, dressing, etc.)  - Assess/evaluate cause of self-care deficits   - Assess range of motion  - Assess patient's mobility; develop plan if impaired  - Assess patient's need for assistive devices and provide as appropriate  - Encourage maximum independence but intervene and supervise when necessary  - Involve family in performance of ADLs  - Assess for home care needs following discharge   - Consider OT consult to assist with ADL evaluation and planning for discharge  - Provide patient education as  appropriate  Outcome: Progressing  Goal: Maintains/Returns to pre admission functional level  Description: INTERVENTIONS:  - Perform AM-PAC 6 Click Basic Mobility/ Daily Activity assessment daily.  - Set and communicate daily mobility goal to care team and patient/family/caregiver.   - Collaborate with rehabilitation services on mobility goals if consulted  - Perform Range of Motion  times a day.  - Reposition patient every  hours.  - Dangle patient  times a day  - Stand patient  times a day  - Ambulate patient  times a day  - Out of bed to chair  times a day   - Out of bed for meals  times a day  - Out of bed for toileting  - Record patient progress and toleration of activity level   Outcome: Progressing     Problem: Knowledge Deficit  Goal: Patient/family/caregiver demonstrates understanding of disease process, treatment plan, medications, and discharge instructions  Description: Complete learning assessment and assess knowledge base.  Interventions:  - Provide teaching at level of understanding  - Provide teaching via preferred learning methods  Outcome: Progressing     Problem: DISCHARGE PLANNING  Goal: Discharge to home or other facility with appropriate resources  Description: INTERVENTIONS:  - Identify barriers to discharge w/patient and caregiver  - Arrange for needed discharge resources and transportation as appropriate  - Identify discharge learning needs (meds, wound care, etc.)  - Arrange for interpretive services to assist at discharge as needed  - Refer to Case Management Department for coordinating discharge planning if the patient needs post-hospital services based on physician/advanced practitioner order or complex needs related to functional status, cognitive ability, or social support system  Outcome: Progressing

## 2024-08-06 NOTE — DISCHARGE SUMMARY
Obstetrics Discharge Summary  Nanette Camarillo 24 y.o. female MRN: [id number]  Unit/Bed#: -01 Encounter: [id number]    Admission Date: 2024     Discharge Date: 24    Admitting Attending: Dr. Calvo  Delivery Attending: Dr. Pretty  Discharging Attending:  Dr. Calvo    Admitting Diagnoses:   39 weeks gestation of pregnancy  A1GDM  Prior  section  History of successful   Asthma  Anemia    Discharge Diagnoses:   Same, delivered      Procedures: Vaginal birth after  ()    Anesthesia: epidural    Hospital course: Nanette Camarillo is now a 24 y.o.  who was admitted for induction of labor in the setting of A1 GDM.  On exam she was noted to be 1/50/-3.  She was induced with a Gomez balloon and started on Pitocin.  Amniotomy was performed for clear fluid.  She received her epidural for analgesia.  She progressed to complete cervical dilation and began pushing.    Delivery Findings:  After pushing for 13 minutes, Nanette delivered a viable female  on 2024 3:28 PM via Vaginal, Spontaneous. The delivery was uncomplicated.  She sustained no laceration during delivery which was adequately repaired. Patient tolerated the procedure well.  was admitted to the  nursery.    Baby's Weight: 4003 g (8 lb 13.2 oz); 141.2    Apgar scores: 8  and 9  at 1 and 5 minutes, respectively  QBL: Non-Surgical QBL (mL): 91        Her post-delivery course was uncomplicated. Her postpartum pain was well controlled with oral analgesics. Maternal blood type is O positive so RhoGAM was not indicated.    On day of discharge, she was ambulating and able to reasonably perform all ADLs. She was voiding and had appropriate bowel function. Pain was well controlled. She was discharged home on postpartum day #1 without complications. Patient was instructed to follow up with her OBGYN as an outpatient and was given appropriate warnings to call provider if she develops signs of infection or  uncontrolled pain.    Complications: none apparent    Condition at discharge: Good    Disposition: Home    Planned Readmission: No    Discharge Medications:   Please see AVS for a complete list of discharge medications.    Discharge instructions :   -Do not place anything (no partner, tampons or douche) in your vagina for 6 weeks  -You may walk for exercise for the first 6 weeks then gradually return to your usual activities   -Please do not drive for 1 week if you have no stitches and for 2 weeks if you have stitches    -You may take baths or shower per your preference   -Please examine your breasts in the mirror daily and call your doctor for redness or tenderness or increased warmth    -Please call your doctor's office if temperature > 100.4*F or 38* C, worsening pain or a foul discharge.    Follow Up:  - Follow up in 3 weeks for postpartum visit     Milady Radford MD

## 2024-08-06 NOTE — OB LABOR/OXYTOCIN SAFETY PROGRESS
Oxytocin Safety Progress Check Note - Nanette Camarillo 24 y.o. female MRN: [id number]    Unit/Bed#: -01 Encounter: [id number]    Dose (carly-units/min) Oxytocin: 14 carly-units/min  Contraction Frequency (minutes): 2 CTX  Contraction Intensity: Mild/Moderate  Uterine Activity Characteristics: Irregular  Cervical Dilation: 3        Cervical Effacement: 50  Fetal Station: -3  Baseline Rate (FHR): 120 bpm  Fetal Heart Rate (FHT): 120 BPM  FHR Category: 1               Vital Signs:   Vitals:    08/06/24 0416   BP: 107/55   Pulse: 70   Resp: 16   Temp: 98.2 °F (36.8 °C)   SpO2: 99%     Nanette is doing well. Gomez balloon still in place. SVE deferred. FHT cat 1. Continue titrating pitocin.       Kathleen Gonsalez MD 8/6/2024 4:29 AM

## 2024-08-06 NOTE — ANESTHESIA PREPROCEDURE EVALUATION
Procedure:  LABOR ANALGESIA    Relevant Problems   GYN   (+) 39 weeks gestation of pregnancy      HEMATOLOGY   (+) Maternal iron deficiency anemia affecting pregnancy in third trimester, antepartum      PULMONARY   (+) Mild intermittent asthma without complication      Recent labs personally reviewed:  Lab Results   Component Value Date    WBC 8.92 08/05/2024    HGB 11.8 08/05/2024     08/05/2024     Lab Results   Component Value Date    K 3.4 (L) 08/01/2024    BUN 5 08/01/2024    CREATININE 0.52 (L) 08/01/2024    GLUCOSE 123 06/03/2019    GLUCOSE 115 06/03/2019     Lab Results   Component Value Date    PTT 29 08/06/2017      Lab Results   Component Value Date    INR 1.04 08/06/2017       Lab Results   Component Value Date    HGBA1C 5.5 02/20/2024       Type and Screen:  O           Anesthesia Plan  ASA Score- 3     Anesthesia Type- epidural with ASA Monitors.         Additional Monitors:     Airway Plan:            Plan Factors-    Chart reviewed.   Existing labs reviewed. Patient summary reviewed.                  Induction-     Postoperative Plan-     Perioperative Resuscitation Plan - Level 1 - Full Code.       Informed Consent- Anesthetic plan and risks discussed with patient.  I personally reviewed this patient with the CRNA. Discussed and agreed on the Anesthesia Plan with the CRNA..

## 2024-08-06 NOTE — OB LABOR/OXYTOCIN SAFETY PROGRESS
Oxytocin Safety Progress Check Note - Nanette Camarillo 24 y.o. female MRN: [id number]    Unit/Bed#: -01 Encounter: [id number]    Dose (carly-units/min) Oxytocin: 14 carly-units/min  Contraction Frequency (minutes): 2 CTX  Contraction Intensity: Mild/Moderate  Uterine Activity Characteristics: Irregular  Cervical Dilation: 3        Cervical Effacement: 50  Fetal Station: -3  Baseline Rate (FHR): 120 bpm  Fetal Heart Rate (FHT): 120 BPM  FHR Category: 1          Vital Signs:   Vitals:    08/06/24 0416   BP: 107/55   Pulse: 70   Resp: 16   Temp: 98.2 °F (36.8 °C)   SpO2: 99%       Gomez balloon dislodged. SVE as above. Continue titrating pitocin.     Discussed w Dr. Umesh Gonsalez MD 8/6/2024 4:30 AM

## 2024-08-06 NOTE — APP STUDENT NOTE
H & P- Obstetrics   Nanette Camarillo 24 y.o. female MRN: [id number]  Unit/Bed#: -01 Encounter: [id number]      Assessment: 24 y.o.  at 39w0d admitted for induction of labor secondary to A1GDM.  SVE: /-3  FHT: n/a  Clinical EFW: 84% ; Vertex confirmed by TAUS  GBS status: neg   Postpartum contraception plan: to be discussed      Plan:   Admit  CBC, RPR, Type & Screen  Analgesia at maternal request  Glucose monitoring  Expectant management   Antibiotics none    SUBJECTIVE:    Chief Complaint: Induction of labor    HPI: Nanette Camarillo is a 24 y.o.  with an JAIME of 2024, by Last Menstrual Period at 39w0d who is being admitted for labor . She complains of uterine contractions, occurring a few times a day, has no LOF, and reports no VB. She states that the baby moves as usual.    Pregnancy complications: Gestational diabetes. Managed with diet. Per pt, sugars have been well controlled, checks at home regularly.     OB History: Prev  due to fetal intolerance, successful     Patient Active Problem List   Diagnosis    Class 3 severe obesity with body mass index (BMI) of 40.0 to 44.9 in adult ([facility])    History of fetal anomaly in prior pregnancy, currently pregnant    Mild intermittent asthma without complication    History of  delivery, antepartum    Severe obesity due to excess calories affecting pregnancy in third trimester ([facility])    Short cervical length during pregnancy    Round ligament pain    Maternal iron deficiency anemia affecting pregnancy in third trimester, antepartum    Diet controlled gestational diabetes mellitus (GDM) in third trimester    39 weeks gestation of pregnancy    Pelvic pain affecting pregnancy in third trimester, antepartum    Bacterial vaginosis       Baby complications/comments: none    Review of Systems   Constitutional: Negative.        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   Patient to have Co testing in 1 year    Maternal iron deficiency anemia affecting pregnancy in third trimester, antepartum 2024    Obesity     Psychiatric disorder     depression, anxiety       Past Surgical History:   Procedure Laterality Date     SECTION      [place] MISSED  FIRST TRIMESTER SURGICAL N/A 2023    Procedure: DILATATION AND EVACUATION (D&E) 17 WEEKS;  Surgeon: Serenity Calvo MD;  Location: [facility];  Service: Gynecology       Social History     Tobacco Use    Smoking status: Every Day     Current packs/day: 0.00     Types: Cigarettes     Last attempt to quit: 2023     Years since quittin.3     Passive exposure: Past    Smokeless tobacco: Never   Substance Use Topics    Alcohol use: No       Allergies   Allergen Reactions    Morphine Itching    Wound Dressing Adhesive Blisters     Surgical tape         Medications Prior to Admission:     Blood Glucose Monitoring Suppl (OneTouch Verio Flex System) w/Device KIT    glucose blood (OneTouch Verio) test strip    Lidocaine (HM Lidocaine Patch) 4 % PTCH    OneTouch Delica Lancets 33G MISC    Prenatal Vit-Fe Fumarate-FA (Prenatal Plus Vitamin/Mineral)  27-1 MG TABS        OBJECTIVE:  Vitals:     There is no height or weight on file to calculate BMI.     Physical Exam:  OBGyn Exam     SVE:       FHT:       TOCO:        Lab Results   Component Value Date    WBC 7.31 08/01/2024    HGB 11.2 (L) 08/01/2024    HCT 35.1 08/01/2024     08/01/2024     Lab Results   Component Value Date    K 3.4 (L) 08/01/2024     08/01/2024    CO2 21 08/01/2024    BUN 5 08/01/2024    CREATININE 0.52 (L) 08/01/2024    GLUCOSE 123 06/03/2019    GLUCOSE 115 06/03/2019    AST 13 08/01/2024    ALT 6 (L) 08/01/2024       Prenatal Labs: Reviewed      Blood type: O+  Antibody: negative  Group B strep: negative  HIV: negative  Hepatitis B: negative  RPR: non-reactive  Rubella: Immune    1 hour Glucose: 222  3 hour glucose: n/a  Platelets: 231  Hgb: 12.7    >2 Midnights  INPATIENT       Renee Castro  OB/GYN M3  8/5/2024  8:23 PM

## 2024-08-06 NOTE — H&P
H & P- Obstetrics   Nanette Camarillo 24 y.o. female MRN: [id number]  Unit/Bed#: -01 Encounter: [id number]      Assessment: 24 y.o.  at 39w0d admitted for induction of labor secondary to A1GDM.  SVE: /-3  FHT: n/a  Clinical EFW: 84% ; Vertex confirmed by TAUS  GBS status: neg   Postpartum contraception plan: to be discussed    * 39 weeks gestation of pregnancy  Assessment & Plan  Admit to OBGYN   Clear liquid diet   F/u T&S, CBC, RPR   IVF normal saline 125cc/hr   Continuous fetal monitoring and tocometry   Analgesia at maternal request   Vertex by TAUS  Induction plan pollard, pit, amniotomy      Risk of shoulder dystocia  Assessment & Plan  EFW 84%, AC 99%, HC 3%  Discuss shoulder dystocia    Diet controlled gestational diabetes mellitus (GDM) in third trimester  Assessment & Plan  Lab Results   Component Value Date    HGBA1C 5.5 2024       Recent Labs     24   POCGLU 77       Blood Sugar Average: Last 72 hrs:  (P) 77    A1GDM protocol       SUBJECTIVE:    Chief Complaint: Induction of labor    HPI: Nanette Camarillo is a 24 y.o.  with an JAIME of 2024, by Last Menstrual Period at 39w0d who is being admitted for elective IOL with A1GDM. She complains of uterine contractions, occurring a few times a day, has no LOF, and reports no VB. She states that the baby moves as usual.    Pregnancy complications: Gestational diabetes. Managed with diet. Per pt, sugars have been well controlled, checks at home regularly.     OB History: Prev  due to fetal intolerance, successful     Patient Active Problem List   Diagnosis    Class 3 severe obesity with body mass index (BMI) of 40.0 to 44.9 in adult ([facility])    History of fetal anomaly in prior pregnancy, currently pregnant    Mild intermittent asthma without complication    History of  delivery, antepartum    Severe obesity due to excess calories affecting pregnancy in third trimester ([facility])    Short cervical length  during pregnancy    Round ligament pain    Maternal iron deficiency anemia affecting pregnancy in third trimester, antepartum    Diet controlled gestational diabetes mellitus (GDM) in third trimester    39 weeks gestation of pregnancy    Pelvic pain affecting pregnancy in third trimester, antepartum    Bacterial vaginosis       Baby complications/comments: none    Review of Systems   Constitutional: Negative.        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   Patient to have Co testing in 1 year    Maternal iron deficiency anemia affecting pregnancy in third trimester, antepartum 2024    Obesity     Psychiatric disorder     depression, anxiety       Past Surgical History:   Procedure Laterality Date     SECTION      [place] MISSED  FIRST TRIMESTER SURGICAL N/A 2023    Procedure: DILATATION AND EVACUATION (D&E) 17 WEEKS;  Surgeon: Serenity Calvo MD;  Location: [facility];  Service: Gynecology       Social History     Tobacco Use    Smoking status: Every Day     Current packs/day: 0.00     Types: Cigarettes     Last attempt to quit: 2023     Years  since quittin.3     Passive exposure: Past    Smokeless tobacco: Never   Substance Use Topics    Alcohol use: No       Allergies   Allergen Reactions    Morphine Itching    Wound Dressing Adhesive Blisters     Surgical tape         Medications Prior to Admission:     Blood Glucose Monitoring Suppl (OneTouch Verio Flex System) w/Device KIT    glucose blood (OneTouch Verio) test strip    Lidocaine (HM Lidocaine Patch) 4 % PTCH    OneTouch Delica Lancets 33G MISC    Prenatal Vit-Fe Fumarate-FA (Prenatal Plus Vitamin/Mineral) 27-1 MG TABS      OBJECTIVE:  Vitals:  Vitals:    24 0416   BP: 107/55   Pulse: 70   Resp: 16   Temp: 98.2 °F (36.8 °C)   SpO2: 99%     Physical Exam:  Physical Exam  Constitutional:       Appearance: Normal appearance.   HENT:      Head: Atraumatic.   Eyes:      Extraocular Movements: Extraocular movements intact.      Conjunctiva/sclera: Conjunctivae normal.   Cardiovascular:      Rate and Rhythm: Normal rate and regular rhythm.      Pulses: Normal pulses.   Pulmonary:      Effort: Pulmonary effort is normal. No respiratory distress.      Breath sounds: Normal breath sounds.   Abdominal:      Palpations: Abdomen is soft.      Tenderness: There is no abdominal tenderness.   Neurological:      General: No focal deficit present.      Mental Status: She is alert and oriented to person, place, and time.   Skin:     General: Skin is warm and dry.   Psychiatric:         Mood and Affect: Mood normal.         Behavior: Behavior normal.   Vitals reviewed. Exam conducted with a chaperone present.     SVE:   /-3    FHT:  Baseline 120bpm  Mod variability  15x15 accels  No decels    TOCO:    No ctx    Lab Results   Component Value Date    WBC 7.31 2024    HGB 11.2 (L) 2024    HCT 35.1 2024     2024     Lab Results   Component Value Date    K 3.4 (L) 2024     2024    CO2 21 2024    BUN 5 2024    CREATININE 0.52 (L) 2024    GLUCOSE  123 06/03/2019    GLUCOSE 115 06/03/2019    AST 13 08/01/2024    ALT 6 (L) 08/01/2024       Prenatal Labs: Reviewed      Blood type: O+  Antibody: negative  Group B strep: negative  HIV: negative  Hepatitis B: negative  Hep C: neg  RPR: non-reactive  Rubella: Immune  Chlamydia: neg  Gonorrhea: neg  1 hour Glucose: 222  3 hour glucose: n/a  Platelets: 231  Hgb: 12.7    >2 Midnights  INPATIENT     Kathleen Gonsalez  PGY-1 OB/GYN  08/06/24  4:28 AM     Renee Castro  OB/GYN M3  8/5/2024  8:23 PM

## 2024-08-06 NOTE — OB LABOR/OXYTOCIN SAFETY PROGRESS
Labor Progress Note - Nanette Camarillo 24 y.o. female MRN: [id number]    Unit/Bed#: -01 Encounter: [id number]    Dose (carly-units/min) Oxytocin: 20 carly-units/min  Contraction Frequency (minutes): 2-4.5  Contraction Intensity: Mild/Moderate  Uterine Activity Characteristics: Regular  Cervical Dilation: 3        Cervical Effacement: 50  Fetal Station: -3  Baseline Rate (FHR): 130 bpm  Fetal Heart Rate (FHT): 136 BPM  FHR Category: 1               Vital Signs:   Vitals:    08/06/24 0800   BP: 122/73   Pulse: 85   Resp:    Temp:    SpO2:        Notes/comments:   Pt requesting IV meds rather than Epidural at this time. Considering Nubain vs Stadol depending on pharmacy availability. Slightly uncomfortable.      Milady Radford MD 8/6/2024 8:18 AM

## 2024-08-06 NOTE — OB LABOR/OXYTOCIN SAFETY PROGRESS
Labor Progress Note - Nanette Camarillo 24 y.o. female MRN: [id number]    Unit/Bed#: -01 Encounter: [id number]       Contraction Frequency (minutes): 0     Uterine Activity Characteristics: Irritability  Cervical Dilation: 1        Cervical Effacement: 50  Fetal Station: -3  Baseline Rate (FHR): 135 bpm  Fetal Heart Rate (FHT): 155 BPM  FHR Category: 1           Vital Signs:   Vitals:    08/05/24 2113   BP: 115/61   Pulse: 72   Resp: 18   Temp: 98.3 °F (36.8 °C)   SpO2: 97%     PROCEDURE:  DUPREE BALLOON PLACEMENT    A 24F dupree with a 30cc balloon was selected, SVE was performed and cervix was located. Speculum was introduced for visualization of cervix. Dupree was introduced over sterile gloved hands with ring forceps. Balloon advanced through cervix beyond the internal cervical os. A small amount amount of sterile saline solution was instilled in the balloon to confirm placement. Placement was confirmed to be beyond the internal cervical os. A total of 60cc of sterile saline solution was placed into the balloon. Pt tolerated well. Instructions left with RN to place dupree to gravity with a 1L bag of IV fluid. Notify MD when dupree dislodged.    MD Kathleen العراقي MD 8/5/2024 10:41 PM

## 2024-08-06 NOTE — PLAN OF CARE
Problem: PAIN - ADULT  Goal: Verbalizes/displays adequate comfort level or baseline comfort level  Description: Interventions:  - Encourage patient to monitor pain and request assistance  - Assess pain using appropriate pain scale  - Administer analgesics based on type and severity of pain and evaluate response  - Implement non-pharmacological measures as appropriate and evaluate response  - Consider cultural and social influences on pain and pain management  - Notify physician/advanced practitioner if interventions unsuccessful or patient reports new pain  8/6/2024 1612 by Evonne Wilcox RN  Outcome: Progressing  8/6/2024 0730 by Evonne Wilcox RN  Outcome: Progressing     Problem: INFECTION - ADULT  Goal: Absence or prevention of progression during hospitalization  Description: INTERVENTIONS:  - Assess and monitor for signs and symptoms of infection  - Monitor lab/diagnostic results  - Monitor all insertion sites, i.e. indwelling lines, tubes, and drains  - Monitor endotracheal if appropriate and nasal secretions for changes in amount and color  - [place] appropriate cooling/warming therapies per order  - Administer medications as ordered  - Instruct and encourage patient and family to use good hand hygiene technique  - Identify and instruct in appropriate isolation precautions for identified infection/condition  8/6/2024 1612 by Evonne Wilcox RN  Outcome: Progressing  8/6/2024 0730 by Evonne Wilcox RN  Outcome: Progressing  Goal: Absence of fever/infection during neutropenic period  Description: INTERVENTIONS:  - Monitor WBC    8/6/2024 1612 by Evonne Wilcox RN  Outcome: Progressing  8/6/2024 0730 by Evonne Wilcox RN  Outcome: Progressing     Problem: SAFETY ADULT  Goal: Patient will remain free of falls  Description: INTERVENTIONS:  - Educate patient/family on patient safety including physical limitations  - Instruct patient to call for assistance with activity   - Consult OT/PT to assist with  strengthening/mobility   - Keep Call bell within reach  - Keep bed low and locked with side rails adjusted as appropriate  - Keep care items and personal belongings within reach  - Initiate and maintain comfort rounds  - Make Fall Risk Sign visible to staff  - Apply yellow socks and bracelet for high fall risk patients  - Consider moving patient to room near nurses station  8/6/2024 1612 by Evonne Wilcox RN  Outcome: Progressing  8/6/2024 0730 by Evonne Wilcox RN  Outcome: Progressing  Goal: Maintain or return to baseline ADL function  Description: INTERVENTIONS:  -  Assess patient's ability to carry out ADLs; assess patient's baseline for ADL function and identify physical deficits which impact ability to perform ADLs (bathing, care of mouth/teeth, toileting, grooming, dressing, etc.)  - Assess/evaluate cause of self-care deficits   - Assess range of motion  - Assess patient's mobility; develop plan if impaired  - Assess patient's need for assistive devices and provide as appropriate  - Encourage maximum independence but intervene and supervise when necessary  - Involve family in performance of ADLs  - Assess for home care needs following discharge   - Consider OT consult to assist with ADL evaluation and planning for discharge  - Provide patient education as appropriate  8/6/2024 1612 by Evonne Wilcox RN  Outcome: Progressing  8/6/2024 0730 by Evonne Wilcox RN  Outcome: Progressing  Goal: Maintains/Returns to pre admission functional level  Description: INTERVENTIONS:  - Perform AM-PAC 6 Click Basic Mobility/ Daily Activity assessment daily.  - Set and communicate daily mobility goal to care team and patient/family/caregiver.   - Collaborate with rehabilitation services on mobility goals if consulted  - Out of bed for toileting  - Record patient progress and toleration of activity level   8/6/2024 1612 by Evonne Wilcox RN  Outcome: Progressing  8/6/2024 0730 by Evonne Wilcox RN  Outcome: Progressing      Problem: Knowledge Deficit  Goal: Patient/family/caregiver demonstrates understanding of disease process, treatment plan, medications, and discharge instructions  Description: Complete learning assessment and assess knowledge base.  Interventions:  - Provide teaching at level of understanding  - Provide teaching via preferred learning methods  2024 by Evonne Wilcox RN  Outcome: Progressing  2024 by Evonne Wilcox RN  Outcome: Progressing     Problem: DISCHARGE PLANNING  Goal: Discharge to home or other facility with appropriate resources  Description: INTERVENTIONS:  - Identify barriers to discharge w/patient and caregiver  - Arrange for needed discharge resources and transportation as appropriate  - Identify discharge learning needs (meds, wound care, etc.)  - Arrange for interpretive services to assist at discharge as needed  - Refer to Case Management Department for coordinating discharge planning if the patient needs post-hospital services based on physician/advanced practitioner order or complex needs related to functional status, cognitive ability, or social support system  2024 by Evonne Wilcox RN  Outcome: Progressing  2024 by Evonne Wilcox RN  Outcome: Progressing     Problem: POSTPARTUM  Goal: Experiences normal postpartum course  Description: INTERVENTIONS:  - Monitor maternal vital signs  - Assess uterine involution and lochia  Outcome: Progressing  Goal: Appropriate maternal -  bonding  Description: INTERVENTIONS:  - Identify family support  - Assess for appropriate maternal/infant bonding   -Encourage maternal/infant bonding opportunities  - Referral to  or  as needed  Outcome: Progressing  Goal: Establishment of infant feeding pattern  Description: INTERVENTIONS:  - Assess breast/bottle feeding  - Refer to lactation as needed  Outcome: Progressing  Goal: Incision(s), wounds(s) or drain site(s) healing without S/S of  infection  Description: INTERVENTIONS  - Assess and document dressing, incision, wound bed, drain sites and surrounding tissue  - Provide patient and family education  Outcome: Progressing

## 2024-08-06 NOTE — OB LABOR/OXYTOCIN SAFETY PROGRESS
Oxytocin Safety Progress Check Note - Nanette Camarillo 24 y.o. female MRN: [id number]    Unit/Bed#: -01 Encounter: [id number]    Dose (carly-units/min) Oxytocin: 20 carly-units/min  Contraction Frequency (minutes): 2-3  Contraction Intensity: Moderate  Uterine Activity Characteristics: Regular  Cervical Dilation: 5-6        Cervical Effacement: 80  Fetal Station: -2  Baseline Rate (FHR): 135 bpm  Fetal Heart Rate (FHT): 158 BPM  FHR Category: 1               Vital Signs:   Vitals:    08/06/24 1315   BP: 107/55   Pulse: 83   Resp:    Temp:    SpO2:        Notes/comments:   Pt requesting check; was previously discouraged but excited now that she has made progress.     Milady Radford MD 8/6/2024 1:31 PM

## 2024-08-06 NOTE — OB LABOR/OXYTOCIN SAFETY PROGRESS
Labor Progress Note - Nanette Camarillo 24 y.o. female MRN: [id number]    Unit/Bed#: -01 Encounter: [id number]    Dose (carly-units/min) Oxytocin: 16 carly-units/min (verbal from Dr. Pretty to decrease)  Contraction Frequency (minutes): 2  Contraction Intensity: Moderate  Uterine Activity Characteristics: Regular  Cervical Dilation: 4-5        Cervical Effacement: 80  Fetal Station: -2  Baseline Rate (FHR): 135 bpm  Fetal Heart Rate (FHT): 135 BPM  FHR Category: i             Vital Signs:   Vitals:    24 0915   BP: 132/76   Pulse: 90   Resp:    Temp:    SpO2:        Notes/comments:   SVE 4.5/80/-2  AROM for clear fluid     Nanette is concerned about the risk of shoulder dystocia and having a bone broken intentionally in order to delivery her  I explained that a shoulder dystocia is an emergency that can be life threatening to babies and as such if recognized we use certain maneuvers to delivery which usually are attempts at rotation. I explained that sometimes bones can break (most commonly the clavicle or humerus) and nerves can be stretched during the maneuvers, however, these are usually incidental and not intentional and furthermore babies tend to heal very well from these. I also explained these injuries can happen even in the absence of a shoulder dystocia.     I explained that based off of her last ultrasound she was not projected to reach a weight at which we would recommend a , however, if she is nervous about these risks, she can opt for  to try to help and minimize these risks though it does not eliminate these risks. She would like to continue labor.    All questions answered to the best of my ability.        Shy Pretty MD 2024 9:34 AM

## 2024-08-06 NOTE — ANESTHESIA PROCEDURE NOTES
Epidural Block    Patient location during procedure: OB/L&D  Start time: 8/6/2024 11:21 AM  Reason for block: procedure for pain  Staffing  Performed by: Estella Mohamud MD  Authorized by: Estella Mohamud MD    Preanesthetic Checklist  Completed: patient identified, IV checked, site marked, risks and benefits discussed, surgical consent, monitors and equipment checked, pre-op evaluation and timeout performed  Epidural  Patient position: sitting  Prep: ChloraPrep  Sedation Level: no sedation  Patient monitoring: frequent blood pressure checks, continuous pulse oximetry and heart rate  Approach: midline  Location: lumbar, L4-5  Injection technique: ELTON saline  Needle  Needle type: Tuohy   Needle gauge: 18 G  Needle insertion depth: 6 cm  Catheter type: multi-orifice  Catheter size: 20 G  Catheter at skin depth: 12 cm  Catheter securement method: stabilization device and clear occlusive dressing  Test dose: negativelidocaine-epinephrine (XYLOCAINE-MPF/EPINEPHRINE) 1.5 %-1:200,000 injection 3 mL - Epidural   3 mL - 8/6/2024 11:22:00 AM  Assessment  Sensory level: T10  Number of attempts: 1negative aspiration for CSF, negative aspiration for heme and no paresthesia on injection  patient tolerated the procedure well with no immediate complications  Additional Notes  1 smooth attempt after helping patient through contractions/getting into better positioning

## 2024-08-06 NOTE — OB LABOR/OXYTOCIN SAFETY PROGRESS
Oxytocin Safety Progress Check Note - Nanette Camarillo 24 y.o. female MRN: [id number]    Unit/Bed#: -01 Encounter: [id number]    Dose (carly-units/min) Oxytocin: 20 carly-units/min  Contraction Frequency (minutes): 1.5-3.5  Contraction Intensity: Mild/Moderate  Uterine Activity Characteristics: Regular  Cervical Dilation: 3        Cervical Effacement: 50  Fetal Station: -3  Baseline Rate (FHR): 120 bpm  Fetal Heart Rate (FHT): 128 BPM  FHR Category: 1             Vital Signs:   Vitals:    08/06/24 0615   BP: 129/72   Pulse: 75   Resp: 18   Temp: 98 °F (36.7 °C)   SpO2: 97%       Nanette is feeling well. SVE as above. FHT cat 1. Continue titrating pitocin.     Kathleen Gonsalez MD 8/6/2024 6:24 AM

## 2024-08-06 NOTE — L&D DELIVERY NOTE
Vaginal Delivery Note - OB/GYN   Nanette Camarillo 24 y.o. female MRN: [id number]  Unit/Bed#: -01 Encounter: [id number]    Pre-delivery Diagnosis:   39 weeks gestation of pregnancy  A1GDM  Prior  section  History of successful   Asthma  Anemia    Post-delivery Diagnosis: same, delivered    Procedure:     Attending: Shy Pretty MD    Assistant(s): Milady Radford MD    Anesthesia: Epidural    QBL: 91cc    Complications: none apparent    Specimens:   1. Arterial and venous cord gases  2. Cord blood  3. Segment of umbilical cord  4. Placenta to storage     Findings:  1. Viable female on 2024 at 1528, with APGARS of 8 and 9 at 1 and 5 minutes respectively,  2. Spontaneous delivery of intact placenta at 1533  3. No lacerations  4. Umbilical Cord Venous Blood Gas:  Results from last 7 days   Lab Units 24  1532   PH COV  7.386   PCO2 COV mm HG 35.1   HCO3 COV mmol/L 20.6   BASE EXC COV mmol/L -3.6*   O2 CT CD VB mL/dL 20.3   O2 HGB, VENOUS CORD % 84.8     5. Umbilical Cord Arterial Blood Gas:        Disposition:  Patient is recovering in labor and delivery room     Brief history and labor course:  Nanette Camarillo is now a 24 y.o.  who was admitted for induction of labor in the setting of A1 GDM.  On exam she was noted to be 1/50/-3.  She was induced with a Gomez balloon and started on Pitocin.  Amniotomy was performed for clear fluid.  She received her epidural for analgesia.  She progressed to complete cervical dilation and began pushing.     Description of procedure:  After pushing for 13 minutes, the patient delivered a viable female , apgars of 8 (1 min) and 9 (5 min). The fetal vertex delivered spontaneously. Baby restituted to Left Occiput Anterior. The anterior shoulder delivered atraumatically with maternal expulsive forces and the assistance of gentle downward traction. The posterior shoulder delivered with maternal expulsive forces and the assistance of gentle upward  traction. The remainder of the fetus delivered spontaneously.    Upon delivery, the infant was placed on the mothers abdomen and the cord was clamped and cut. The infant was noted to cry spontaneously and was moving all extremities appropriately. There was no evidence OG injury. Awaiting resuscitators evaluated the . Arterial and venous cord blood gases and cord blood was collected for analysis. These were promptly sent to the lab. In the immediate post-partum, 30 units of IV pitocin was administered, and the uterus was noted to contract down well with massage and pitocin. The placenta delivered spontaneously at 1533 and was noted to have a centrally inserted 3 vessel cord.     The vagina, cervix, perineum, and rectum were inspected and noted to be intact.    At the conclusion of the procedure, all needle, sponge, and instrument counts were noted to be correct.     Disposition:  Patient tolerated the procedure well and was recovering in labor and delivery room     Attending Supervision:   Dr. Shy Pretty MD was present for the entire procedure.    Milady Radford MD  OB/GYN PGY-1  2024 5:58 PM

## 2024-08-06 NOTE — ANESTHESIA POSTPROCEDURE EVALUATION
Post-Op Assessment Note    CV Status:  Stable    Pain management: adequate      Post-op block assessment: no complications and catheter intact   Mental Status:  Alert and awake   Hydration Status:  Euvolemic   PONV Controlled:  Controlled   Airway Patency:  Patent     Post Op Vitals Reviewed: Yes    No anethesia notable event occurred.    Staff: CRNA               BP      Temp      Pulse     Resp      SpO2

## 2024-08-06 NOTE — PLAN OF CARE
Problem: BIRTH - VAGINAL/ SECTION  Goal: Fetal and maternal status remain reassuring during the birth process  Description: INTERVENTIONS:  - Monitor vital signs  - Monitor fetal heart rate  - Monitor uterine activity  - Monitor labor progression (vaginal delivery)  - DVT prophylaxis  - Antibiotic prophylaxis  Outcome: Progressing  Goal: Emotionally satisfying birthing experience for mother/fetus  Description: Interventions:  - Assess, plan, implement and evaluate the nursing care given to the patient in labor  - Advocate the philosophy that each childbirth experience is a unique experience and support the family's chosen level of involvement and control during the labor process   - Actively participate in both the patient's and family's teaching of the birth process  - Consider cultural, Rastafari and age-specific factors and plan care for the patient in labor  Outcome: Progressing     Problem: PAIN - ADULT  Goal: Verbalizes/displays adequate comfort level or baseline comfort level  Description: Interventions:  - Encourage patient to monitor pain and request assistance  - Assess pain using appropriate pain scale  - Administer analgesics based on type and severity of pain and evaluate response  - Implement non-pharmacological measures as appropriate and evaluate response  - Consider cultural and social influences on pain and pain management  - Notify physician/advanced practitioner if interventions unsuccessful or patient reports new pain  Outcome: Progressing     Problem: INFECTION - ADULT  Goal: Absence or prevention of progression during hospitalization  Description: INTERVENTIONS:  - Assess and monitor for signs and symptoms of infection  - Monitor lab/diagnostic results  - Monitor all insertion sites, i.e. indwelling lines, tubes, and drains  - Monitor endotracheal if appropriate and nasal secretions for changes in amount and color  - [place] appropriate cooling/warming therapies per order  -  Administer medications as ordered  - Instruct and encourage patient and family to use good hand hygiene technique  - Identify and instruct in appropriate isolation precautions for identified infection/condition  Outcome: Progressing  Goal: Absence of fever/infection during neutropenic period  Description: INTERVENTIONS:  - Monitor WBC    Outcome: Progressing     Problem: SAFETY ADULT  Goal: Patient will remain free of falls  Description: INTERVENTIONS:  - Educate patient/family on patient safety including physical limitations  - Instruct patient to call for assistance with activity   - Consult OT/PT to assist with strengthening/mobility   - Keep Call bell within reach  - Keep bed low and locked with side rails adjusted as appropriate  - Keep care items and personal belongings within reach  - Initiate and maintain comfort rounds  - Make Fall Risk Sign visible to staff  - Apply yellow socks and bracelet for high fall risk patients  - Consider moving patient to room near nurses station  Outcome: Progressing  Goal: Maintain or return to baseline ADL function  Description: INTERVENTIONS:  -  Assess patient's ability to carry out ADLs; assess patient's baseline for ADL function and identify physical deficits which impact ability to perform ADLs (bathing, care of mouth/teeth, toileting, grooming, dressing, etc.)  - Assess/evaluate cause of self-care deficits   - Assess range of motion  - Assess patient's mobility; develop plan if impaired  - Assess patient's need for assistive devices and provide as appropriate  - Encourage maximum independence but intervene and supervise when necessary  - Involve family in performance of ADLs  - Assess for home care needs following discharge   - Consider OT consult to assist with ADL evaluation and planning for discharge  - Provide patient education as appropriate  Outcome: Progressing  Goal: Maintains/Returns to pre admission functional level  Description: INTERVENTIONS:  - Perform  AM-PAC 6 Click Basic Mobility/ Daily Activity assessment daily.  - Set and communicate daily mobility goal to care team and patient/family/caregiver.   - Collaborate with rehabilitation services on mobility goals if consulted  - Out of bed for toileting  - Record patient progress and toleration of activity level   Outcome: Progressing     Problem: Knowledge Deficit  Goal: Patient/family/caregiver demonstrates understanding of disease process, treatment plan, medications, and discharge instructions  Description: Complete learning assessment and assess knowledge base.  Interventions:  - Provide teaching at level of understanding  - Provide teaching via preferred learning methods  Outcome: Progressing     Problem: DISCHARGE PLANNING  Goal: Discharge to home or other facility with appropriate resources  Description: INTERVENTIONS:  - Identify barriers to discharge w/patient and caregiver  - Arrange for needed discharge resources and transportation as appropriate  - Identify discharge learning needs (meds, wound care, etc.)  - Arrange for interpretive services to assist at discharge as needed  - Refer to Case Management Department for coordinating discharge planning if the patient needs post-hospital services based on physician/advanced practitioner order or complex needs related to functional status, cognitive ability, or social support system  Outcome: Progressing

## 2024-08-06 NOTE — ASSESSMENT & PLAN NOTE
- Continue routine postpartum care  - Pain well controlled with oral analgesics  - Voiding spontaneously  - Tolerating PO fluids and solids  - Encourage breastfeeding and familial bonding  - Contraception: interval Mirena IUD

## 2024-08-07 VITALS
WEIGHT: 254 LBS | HEART RATE: 60 BPM | SYSTOLIC BLOOD PRESSURE: 110 MMHG | TEMPERATURE: 97.8 F | OXYGEN SATURATION: 99 % | HEIGHT: 63 IN | DIASTOLIC BLOOD PRESSURE: 64 MMHG | BODY MASS INDEX: 45 KG/M2 | RESPIRATION RATE: 18 BRPM

## 2024-08-07 PROCEDURE — NC001 PR NO CHARGE: Performed by: OBSTETRICS & GYNECOLOGY

## 2024-08-07 PROCEDURE — 99024 POSTOP FOLLOW-UP VISIT: CPT | Performed by: OBSTETRICS & GYNECOLOGY

## 2024-08-07 RX ORDER — CALCIUM CARBONATE 500 MG/1
1000 TABLET, CHEWABLE ORAL DAILY PRN
Qty: 14 TABLET | Refills: 0 | Status: SHIPPED | OUTPATIENT
Start: 2024-08-07 | End: 2024-08-21

## 2024-08-07 RX ORDER — IBUPROFEN 600 MG/1
600 TABLET ORAL EVERY 6 HOURS PRN
Qty: 30 TABLET | Refills: 0 | Status: SHIPPED | OUTPATIENT
Start: 2024-08-07

## 2024-08-07 RX ORDER — DOCUSATE SODIUM 100 MG/1
100 CAPSULE, LIQUID FILLED ORAL 2 TIMES DAILY
Qty: 60 CAPSULE | Refills: 0 | Status: SHIPPED | OUTPATIENT
Start: 2024-08-07 | End: 2024-09-06

## 2024-08-07 RX ORDER — ACETAMINOPHEN 325 MG/1
975 TABLET ORAL EVERY 6 HOURS PRN
Start: 2024-08-07

## 2024-08-07 RX ORDER — LIDOCAINE 4 G/G
1 PATCH TOPICAL DAILY
Qty: 10 PATCH | Refills: 0 | Status: SHIPPED | OUTPATIENT
Start: 2024-08-07 | End: 2024-08-12

## 2024-08-07 RX ORDER — BENZOCAINE/MENTHOL 6 MG-10 MG
1 LOZENGE MUCOUS MEMBRANE DAILY PRN
Qty: 1.5 G | Refills: 0 | Status: SHIPPED | OUTPATIENT
Start: 2024-08-07 | End: 2024-08-21

## 2024-08-07 RX ORDER — SIMETHICONE 80 MG
80 TABLET,CHEWABLE ORAL 4 TIMES DAILY PRN
Qty: 30 TABLET | Refills: 0 | Status: SHIPPED | OUTPATIENT
Start: 2024-08-07

## 2024-08-07 RX ADMIN — ACETAMINOPHEN 975 MG: 325 TABLET, FILM COATED ORAL at 01:19

## 2024-08-07 RX ADMIN — IBUPROFEN 600 MG: 600 TABLET ORAL at 15:20

## 2024-08-07 RX ADMIN — DOCUSATE SODIUM 100 MG: 100 CAPSULE, LIQUID FILLED ORAL at 17:45

## 2024-08-07 RX ADMIN — ACETAMINOPHEN 975 MG: 325 TABLET, FILM COATED ORAL at 17:45

## 2024-08-07 RX ADMIN — IBUPROFEN 600 MG: 600 TABLET ORAL at 04:35

## 2024-08-07 RX ADMIN — DOCUSATE SODIUM 100 MG: 100 CAPSULE, LIQUID FILLED ORAL at 09:20

## 2024-08-07 NOTE — PLAN OF CARE
Problem: PAIN - ADULT  Goal: Verbalizes/displays adequate comfort level or baseline comfort level  Description: Interventions:  - Encourage patient to monitor pain and request assistance  - Assess pain using appropriate pain scale  - Administer analgesics based on type and severity of pain and evaluate response  - Implement non-pharmacological measures as appropriate and evaluate response  - Consider cultural and social influences on pain and pain management  - Notify physician/advanced practitioner if interventions unsuccessful or patient reports new pain  Outcome: Progressing     Problem: INFECTION - ADULT  Goal: Absence or prevention of progression during hospitalization  Description: INTERVENTIONS:  - Assess and monitor for signs and symptoms of infection  - Monitor lab/diagnostic results  - Monitor all insertion sites, i.e. indwelling lines, tubes, and drains  - Monitor endotracheal if appropriate and nasal secretions for changes in amount and color  - Lorida appropriate cooling/warming therapies per order  - Administer medications as ordered  - Instruct and encourage patient and family to use good hand hygiene technique  - Identify and instruct in appropriate isolation precautions for identified infection/condition  Outcome: Progressing  Goal: Absence of fever/infection during neutropenic period  Description: INTERVENTIONS:  - Monitor WBC    Outcome: Progressing     Problem: SAFETY ADULT  Goal: Patient will remain free of falls  Description: INTERVENTIONS:  - Educate patient/family on patient safety including physical limitations  - Instruct patient to call for assistance with activity   - Consult OT/PT to assist with strengthening/mobility   - Keep Call bell within reach  - Keep bed low and locked with side rails adjusted as appropriate  - Keep care items and personal belongings within reach  - Initiate and maintain comfort rounds  - Make Fall Risk Sign visible to staff  - Apply yellow socks and bracelet  for high fall risk patients  - Consider moving patient to room near nurses station  Outcome: Progressing  Goal: Maintain or return to baseline ADL function  Description: INTERVENTIONS:  -  Assess patient's ability to carry out ADLs; assess patient's baseline for ADL function and identify physical deficits which impact ability to perform ADLs (bathing, care of mouth/teeth, toileting, grooming, dressing, etc.)  - Assess/evaluate cause of self-care deficits   - Assess range of motion  - Assess patient's mobility; develop plan if impaired  - Assess patient's need for assistive devices and provide as appropriate  - Encourage maximum independence but intervene and supervise when necessary  - Involve family in performance of ADLs  - Assess for home care needs following discharge   - Consider OT consult to assist with ADL evaluation and planning for discharge  - Provide patient education as appropriate  Outcome: Progressing  Goal: Maintains/Returns to pre admission functional level  Description: INTERVENTIONS:  - Perform AM-PAC 6 Click Basic Mobility/ Daily Activity assessment daily.  - Set and communicate daily mobility goal to care team and patient/family/caregiver.   - Collaborate with rehabilitation services on mobility goals if consulted  - Out of bed for toileting  - Record patient progress and toleration of activity level   Outcome: Progressing     Problem: Knowledge Deficit  Goal: Patient/family/caregiver demonstrates understanding of disease process, treatment plan, medications, and discharge instructions  Description: Complete learning assessment and assess knowledge base.  Interventions:  - Provide teaching at level of understanding  - Provide teaching via preferred learning methods  Outcome: Progressing     Problem: DISCHARGE PLANNING  Goal: Discharge to home or other facility with appropriate resources  Description: INTERVENTIONS:  - Identify barriers to discharge w/patient and caregiver  - Arrange for needed  discharge resources and transportation as appropriate  - Identify discharge learning needs (meds, wound care, etc.)  - Arrange for interpretive services to assist at discharge as needed  - Refer to Case Management Department for coordinating discharge planning if the patient needs post-hospital services based on physician/advanced practitioner order or complex needs related to functional status, cognitive ability, or social support system  Outcome: Progressing     Problem: POSTPARTUM  Goal: Experiences normal postpartum course  Description: INTERVENTIONS:  - Monitor maternal vital signs  - Assess uterine involution and lochia  Outcome: Progressing  Goal: Appropriate maternal -  bonding  Description: INTERVENTIONS:  - Identify family support  - Assess for appropriate maternal/infant bonding   -Encourage maternal/infant bonding opportunities  - Referral to  or  as needed  Outcome: Progressing  Goal: Establishment of infant feeding pattern  Description: INTERVENTIONS:  - Assess breast/bottle feeding  - Refer to lactation as needed  Outcome: Progressing  Goal: Incision(s), wounds(s) or drain site(s) healing without S/S of infection  Description: INTERVENTIONS  - Assess and document dressing, incision, wound bed, drain sites and surrounding tissue  - Provide patient and family education  Outcome: Progressing

## 2024-08-07 NOTE — LACTATION NOTE
This note was copied from a baby's chart.  CONSULT - LACTATION  Baby Girl (Gerda Camarillo 1 days female MRN: [id number]    [facility] NURSERY Room / Bed: (N)/(N) Encounter: [id number]    Maternal Information     MOTHER:  Nanette Camarillo  Maternal Age: 24 y.o.  OB History: # 1 - Date: 10/28/15, Sex: Female, Weight: 2410 g (5 lb 5 oz), GA: 39w0d, Type: , Low Transverse, Apgar1: None, Apgar5: None, Living: Living, Birth Comments: None    # 2 - Date: 19, Sex: Male, Weight: 3289 g (7 lb 4 oz), GA: 41w0d, Type: Vaginal, Spontaneous, Apgar1: 8, Apgar5: 9, Living: Living, Birth Comments: None    # 3 - Date: 2023, Sex: None, Weight: None, GA: 15w0d, Type: Spontaneous , Apgar1: None, Apgar5: None, Living: Fetal Demise, Birth Comments: None    # 4 - Date: 24, Sex: Female, Weight: 4003 g (8 lb 13.2 oz), GA: 39w1d, Type: Vaginal, Spontaneous, Apgar1: 8, Apgar5: 9, Living: Living, Birth Comments: None   Previouse breast reduction surgery? No    Lactation history:   Has patient previously breast fed: Yes   How long had patient previously breast fed: 1 week   Previous breast feeding complications: Lack of support     Past Surgical History:   Procedure Laterality Date     SECTION      [place] MISSED  FIRST TRIMESTER SURGICAL N/A 2023    Procedure: DILATATION AND EVACUATION (D&E) 17 WEEKS;  Surgeon: Serenity Calvo MD;  Location: [facility] OR;  Service: Gynecology       Birth information:  YOB: 2025   Time of birth: 3:28 PM   Sex: female   Delivery type: Vaginal, Spontaneous   Birth Weight: 4003 g (8 lb 13.2 oz)   Percent of Weight Change: 0%     Gestational Age: 39w1d   [unfilled]    Assessment     Breast and nipple assessment:  No assessment performed     Assessment: sleepy, No digital oral exam performed    Feeding assessment: feeding well, per mother  LATCH: No latch observed by lactation    Feeding  recommendations:  breast feed on demand  RSB and DC booklets reviewed.   Mother has gifted breast pump. Storkpump pamphlet provided.     Encouraged to call out for latch assess, ext. Provided.     Met with mother. Provided mother with Ready, Set, Baby booklet.    Information on hand expression given. Discussed benefits of knowing how to manually express breast including stimulating milk supply, softening nipple for latch and evacuating breast in the event of engorgement.    Mom is encouraged to place baby skin to skin for feedings. Skin to skin education provided for baby placement on mother's chest, baby only in diaper, blankets below shoulders on baby's back. Skin to skin is encouraged to continue at home for feedings and between feedings.    - Start feedings on breast that last feeding ended   - allow no more than 3 hours between breast feeding sessions   - time between feedings is counted from the beginning of the first feed to the beginning of the next feeding session    Reviewed early signs of hunger, including tensing of hands and shoulders - no need to wait for open eyes.  Crying is a late hunger sign.  If baby is crying, soothe baby first and then attempt to latch.  Reviewed normal sucking patterns: transition from stimulation to nutritive to release or non-nutritive. The goal is to see and hear lots of swallowing.    Reviewed normal nursing pattern: infant could latch on one breast up to 30 minutes or until releases on own. Signs of satiation is open hand with fingers that do not grab your finger.  Discussed difference in sensation of non-nutritive v nutritive sucking    Discussed Skin to Skin contact an benefits to mom and baby.  Talked about the delay of the first bath until baby has adjusted. Spoke about the benefits of rooming in. Feeding on cue and what that means for recognizing infant's hunger. Avoidance of pacifiers for the first month discussed. Talked about exclusive breastfeeding for the first 6  months.    Positioning and latch reviewed as well as showing images of other feeding positions.  Discussed the properties of a good latch in any position. Reviewed hand/manual expression.  Discussed s/s that baby is getting enough milk and some s/s that breastfeeding dyad may need further help.    Gave information on common concerns, what to expect the first few weeks after delivery, preparing for other caregivers, and how partners can help. Resources for support also provided.    Encouraged parents to call for assistance, questions, and concerns about breastfeeding.  Extension provided.      Rosanne Lazaro RN 8/7/2024 4:35 AM

## 2024-08-07 NOTE — LACTATION NOTE
This note was copied from a baby's chart.  Lactation follow up note:  LATCH Documentation   Latch 0   Audible Swallowing 0   Type of Nipple 2   Comfort (Breast/Nipple) 2   Hold (Positioning) 1   LATCH Score 5   Having latch problems? No   Position(s) Used Cross Cradle   Breasts/Nipples   Date Pumping Initiated 08/06/24   Time Pumping Initiated 2150   Left Breast Soft   Left Nipple Everted   Intervention Breast pump;Hand expression   Breastfeeding Status Yes   Breastfeeding Progress Not yet established   Other OB Lactation Tools   Feeding Devices Pump;Bottle   Breast Pump   Pump 3;2;1   Pump Review/Education Setup, frequency, and cleaning;Milk storage   Initiated by MK CLC   Date Initiated 08/06/24   Patient Follow-Up   Lactation Consult Status 2   Follow-Up Type Inpatient;Call as needed   Other OB Lactation Documentation    Additional Problem Noted Assisted with latch, shallow latch, wants to supplement with sim via bottle to assist with sugars, set up with pumping, reviewed paced bottle feeding     Called to room by primary RN to assist with latch. Mother attempting to feed infant in cradle position on left breast. Infant's belly was facing the ceiling with the chin tipped down to the chest. Educated on alignment of long neck and ears, shoulders and hips in line with baby belly to belly with mother. Requires re-inforcement.     Mother would like to supplement with similac via a bottle to ensure baby passes blood sugars. Demonstrated paced bottle feeding. Infant took 12mL quickly.     Set up mother to pump. Reviewed pump frequency, pump cycles, milk storage and pump cleaning.     Encouraged to call out with any additional latch support or questions, ext. Provided.

## 2024-08-07 NOTE — PLAN OF CARE

## 2024-08-07 NOTE — DISCHARGE INSTR - AVS FIRST PAGE
Congratulations Nanette!    For pain:   Tylenol: Up to 1000mg (975mg = 3 tablets) every 6 hours  Motrin: Up to 600mg (1 tablet prescription or 3 tablets of the regular strength 200mg ibuprofen) every 6 hours    I recommend alternating Tylenol and Motrin every 3 hours (Tylenol dosing then 3 hours later Motrin dosing then 3 hours later Tylenol dosing then 3 hours later Motrin dosing, etc.)    Please call the office with any concerns especially if you have any chest pain, shortness of breath, fevers, chills, changes in your vision, persistent headache, elevated blood pressures (>140/>90) or pain in your upper belly on the right side, or concerns about your bleeding. We will see you in the office in 3 weeks.    Take care,   - Dr. Calvo

## 2024-08-08 NOTE — UTILIZATION REVIEW
"NOTIFICATION OF INPATIENT ADMISSION   MATERNITY/DELIVERY AUTHORIZATION REQUEST   SERVICING FACILITY:   [facility]  Parent Child Health - L&D, [facility], NICU  [address] [place] [address]  Tax ID: [id number]  NPI: [id number]   ATTENDING PROVIDER:  Attending Name and NPI#: Shy Pretty Md [[id number]]  Address: [address] [place] [address]  Phone: [phone]   ADMISSION INFORMATION:  Place of Service: Inpatient [facility]  Place of Service Code: 21  Inpatient Admission Date/Time: 24  8:02 PM  Discharge Date/Time: 2024  7:00 PM  Admitting Diagnosis Code/Description:  Encounter for planned induction of labor [Z34.90]  GDM, class A1 [O24.410]  Encounter for full-term uncomplicated delivery [O80]     Mother: Nanette Camarillo 2000 Estimated Date of Delivery: 24  Delivering clinician: Shy Pretty   OB History          4    Para   3    Term   3       0    AB   1    Living   3         SAB   1    IAB   0    Ectopic   0    Multiple   0    Live Births   3           Obstetric Comments   10/28/2015- C/S FTP, NRF  2019-   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             [facility] Name & MRN:   Information for the patient's :  Porfirio Farris [[id number]]    Delivery Information:  Sex: female  Delivered 2024 3:28 PM by Vaginal, Spontaneous; Gestational Age: 39w1d    [facility] Measurements:  Weight: 8 lb 13.2 oz (4003 g);  Height: 20\"    APGAR 1 minute 5 minutes 10 minutes   Totals: 8 9       UTILIZATION REVIEW CONTACT:  Hilda Brennan, Utilization   Network Utilization Review Department  Phone: [phone]  Fax [phone]  Email: [email or web address]  Contact for approvals/pending authorizations, clinical reviews, and discharge.     PHYSICIAN ADVISORY SERVICES:  Medical Necessity Denial & Molk-zy-Lmgm Review  Phone: [phone]  Fax: " [phone]  Email: [email or web address]     DISCHARGE SUPPORT TEAM:  For Patients Discharge Needs & Updates  Phone: [phone] opt. 2 Fax: [phone]  Email: [email or web address]        NOTIFICATION OF ADMISSION DISCHARGE   This is a Notification of Discharge from [facility]. Please be advised that this patient has been discharge from our facility. Below you will find the admission and discharge date and time including the patient’s disposition.   UTILIZATION REVIEW CONTACT:  Hilda Brennan  Utilization   Network Utilization Review Department  Phone: [phone] x carefully listen to the prompts. All voicemails are confidential.  Email: [email or web address]     ADMISSION INFORMATION  PRESENTATION DATE: 8/5/2024  8:02 PM  OBERVATION ADMISSION DATE: N/A  INPATIENT ADMISSION DATE: 8/5/24  8:02 PM   DISCHARGE DATE: 8/7/2024  7:00 PM   DISPOSITION:Home/Self Care    Network Utilization Review Department  ATTENTION: Please call with any questions or concerns to [phone] and carefully listen to the prompts so that you are directed to the right person. All voicemails are confidential.   For Discharge needs, contact Care Management DC Support Team at [phone] opt. 2  Send all requests for admission clinical reviews, approved or denied determinations and any other requests to dedicated fax number below belonging to the campus where the patient is receiving treatment. List of dedicated fax numbers for the Facilities:  FACILITY NAME UR FAX NUMBER   ADMISSION DENIALS (Administrative/Medical Necessity) [phone]   DISCHARGE SUPPORT TEAM ([facility]) [phone]   PARENT CHILD HEALTH (Maternity/NICU/Pediatrics) [phone]   [facility] [phone]   [facility] [phone]   [facility] [phone]   [facility] [phone]   [facility]  [facility] [phone]   [facility] [phone]   [facility] [phone]   [facility] [phone]   [facility] [phone]   [facility] [phone]   [facility] [phone]   [facility] [phone]

## 2024-08-12 DIAGNOSIS — Z13.1 DIABETES MELLITUS SCREENING: Primary | ICD-10-CM

## 2024-08-12 DIAGNOSIS — Z86.32 HISTORY OF GESTATIONAL DIABETES MELLITUS (GDM), NOT CURRENTLY PREGNANT: ICD-10-CM

## 2024-08-12 NOTE — PROGRESS NOTES
Today's Date: 8/12/2024  Pt's Preferred Lab: None Specified  Ambulatory Order    Lab Ordered: 2hr (75GM) GTT   Reason: to screen for T2DM s/p delivery r/t H/O GDM  Time-Frame to be collected: 4-12 weeks postpartum    Patient Instructions: Fasting = Do NOT eat or drink anything except WATER for at least 8 hours before the test.    *For [facility], please call [phone] to schedule.   *To request lab be sent to alternative lab including [facility] or [facility], Call: [phone] or Message Diabetes Team via [facility] Message to LEONIDES Garcia RD   Diabetes Educator   [facility] - Maternal Fetal Medicine  Diabetes and Pregnancy Program

## 2024-08-13 LAB — PLACENTA IN STORAGE: NORMAL

## 2024-08-15 ENCOUNTER — TELEPHONE (OUTPATIENT)
Dept: OBGYN CLINIC | Facility: CLINIC | Age: 24
End: 2024-08-15

## 2024-08-15 NOTE — TELEPHONE ENCOUNTER
POSTPARTUM PHONE CALL ASSESSMENT    Date of Delivery: 24  Delivering Provider: Dr. Pretty  Mode:     Delivery Notes/Complications: none noted   Do you still have bleeding/pain? If so, how much/how severe? Vb - a little bit per pt, not changing pad often. Denies pain.    Regular BMs/Urination? yes  Breastfeeding/Formula/Both? Pumping feeding baby by bottle per baby's preference  How are you doing emotionally? Doing good per pt   EPDS Score: patient was arriving at pediatrician's office for appt for baby and requested form be sent to her via [employer] to complete. [employer] message sent.  Do you have any other questions or concerns for us or your provider? Not at this time   Have you scheduled the pediatrician appointment with pediatrician? yes  Do you have a postpartum visit scheduled? Offered and scheduled at this time, 24

## 2024-09-04 ENCOUNTER — POSTPARTUM VISIT (OUTPATIENT)
Dept: OBGYN CLINIC | Facility: CLINIC | Age: 24
End: 2024-09-04

## 2024-09-04 VITALS — SYSTOLIC BLOOD PRESSURE: 120 MMHG | DIASTOLIC BLOOD PRESSURE: 60 MMHG | BODY MASS INDEX: 41.81 KG/M2 | WEIGHT: 236 LBS

## 2024-09-04 DIAGNOSIS — D50.9 MATERNAL IRON DEFICIENCY ANEMIA AFFECTING PREGNANCY IN THIRD TRIMESTER, ANTEPARTUM: Primary | ICD-10-CM

## 2024-09-04 DIAGNOSIS — O99.013 MATERNAL IRON DEFICIENCY ANEMIA AFFECTING PREGNANCY IN THIRD TRIMESTER, ANTEPARTUM: Primary | ICD-10-CM

## 2024-09-04 PROCEDURE — 99024 POSTOP FOLLOW-UP VISIT: CPT | Performed by: NURSE PRACTITIONER

## 2024-09-04 NOTE — PROGRESS NOTES
Subjective     Nanette Camarillo is a 24 y.o. female who presents for a postpartum visit. She is 4 weeks postpartum following a spontaneous vaginal delivery. I have fully reviewed the prenatal and intrapartum course. The delivery was at 39 1/7 gestational weeks. Outcome: spontaneous vaginal delivery for a healthy baby girl. Anesthesia: epidural. Postpartum course has been going well.     Baby Porfirio Colin's course has been unremarkable. She is feeding by both breast and bottle.     Bleeding is staining only. Bowel function is normal. Bladder function is normal. Patient is not sexually active. Will wait until she can initiate birth control. She is interested in the Mirena.     Postpartum depression screening: negative.    The following portions of the patient's history were reviewed and updated as appropriate: allergies, current medications, past family history, past medical history, past social history, past surgical history, and problem list.    Review of Systems  Pertinent items are noted in HPI..    Objective     /60   Wt 107 kg (236 lb)   LMP 11/06/2023   Breastfeeding Yes   BMI 41.81 kg/m²      General:  alert and oriented, in no acute distress    Breasts:  inspection negative, no nipple discharge or bleeding, no masses or nodularity palpable   Lungs: clear to auscultation bilaterally   Heart:  regular rate and rhythm, S1, S2 normal, no murmur, click, rub or gallop   Abdomen: soft, non-tender; bowel sounds normal; no masses,  no organomegaly    Vulva:  normal   Vagina: normal vagina   Cervix:  multiparous appearance   Corpus: normal size, contour, position, consistency, mobility, non-tender   Adnexa:  normal adnexa   Rectal Exam: Not performed.     Assessment & Plan     4 weeks postpartum exam. Pap smear not done at today's visit.    1. Contraception: Desires a Mirena IUD.     2. Return in 4 weeks for IUD insertion.

## 2024-10-15 ENCOUNTER — PROCEDURE VISIT (OUTPATIENT)
Dept: OBGYN CLINIC | Facility: CLINIC | Age: 24
End: 2024-10-15
Payer: COMMERCIAL

## 2024-10-15 VITALS
DIASTOLIC BLOOD PRESSURE: 80 MMHG | WEIGHT: 238 LBS | OXYGEN SATURATION: 98 % | SYSTOLIC BLOOD PRESSURE: 120 MMHG | HEART RATE: 69 BPM | BODY MASS INDEX: 42.16 KG/M2

## 2024-10-15 DIAGNOSIS — Z30.430 ENCOUNTER FOR IUD INSERTION: Primary | ICD-10-CM

## 2024-10-15 LAB — SL AMB POCT URINE HCG: NEGATIVE

## 2024-10-15 PROCEDURE — 58300 INSERT INTRAUTERINE DEVICE: CPT | Performed by: NURSE PRACTITIONER

## 2024-10-15 PROCEDURE — 81025 URINE PREGNANCY TEST: CPT | Performed by: NURSE PRACTITIONER

## 2024-10-15 NOTE — PROGRESS NOTES
Nanette Camarillo 24-year-old  here for a Mirena IUD insertion. She is 2 months post vaginal delivery.      Visit Vitals  /80   Pulse 69   Wt 108 kg (238 lb)   LMP 10/01/2024   SpO2 98%   Breastfeeding No   BMI 42.16 kg/m²   OB Status Recent pregnancy   Smoking Status Every Day   BSA 2.08 m²     Results from last 6 Months   Lab Units 10/15/24  1410   URINE HCG  negative     IUD Procedure    Date/Time: 10/15/2024 2:21 PM    Performed by: LEONIDES Yusuf  Authorized by: LEONIDES Yusuf    Other Assisting Provider: No    Verbal consent obtained?: Yes    Risks and benefits: Risks, benefits and alternatives were discussed    Consent given by:  Patient  Time Out:     Time out performed at:  10/15/2024 2:20 PM  Patient states understanding of procedure being performed: Yes    Patient's understanding of procedure matches consent: Yes    Procedure consent matches procedure scheduled: Yes    Relevant documents present and verified: Yes    Test results available and properly labeled: Yes    Site marked: No    Radiology Images displayed and confirmed. If images not available, report reviewed: No    Patient identity confirmed:  Verbally with patient  Select procedure: IUD insertion    IUD Insertion:     Pelvic exam performed: yes      Negative GC/chlamydia test: Low STD risk.      Negative urine pregnancy test: yes      Cervix cleaned and prepped: yes      Speculum placed in vagina: yes      Allis applied to cervix: yes      IUD inserted with no complications: yes      Strings trimmed: yes      Uterus sounded: yes      Uterus sound depth (cm):  8    IUD type:  1 each Levonorgestrel 20 MCG/DAY  Post-procedure:     Patient tolerated procedure well: yes      Patient will follow up after next period: yes    Insertion Comments:      IUD inserted without difficulty. A bedside US was performed and shows no evidence of perforation. Patient given instruction booklet. She is to return in 5 weeks for follow up.        Nanette was seen today for contraception.    Diagnoses and all orders for this visit:    Encounter for IUD insertion  -     POCT urine HCG  -     IUD Procedure

## 2024-11-05 ENCOUNTER — TELEPHONE (OUTPATIENT)
Age: 24
End: 2024-11-05

## 2024-11-05 NOTE — TELEPHONE ENCOUNTER
Spoke with patient and reassured her that the bleeding with the IUD initially is expected. Her bleeding can fluctuate. She needs to allow the Mirena time to take full effect. She is scheduled for f/u on 11/25.

## 2024-11-05 NOTE — TELEPHONE ENCOUNTER
Patient has mirena inserted 10/15. 2 days later got cycle which continues today which is 3 wks. Bleeding is moderate, but complaining of daily, at times, severe cramping. Tylenol and ibuprofen ineffective.  States she had severe bleeding with depo and is a smoker so pill was not recommended.  States she will try to go a little longer, but is looking for an alternative as wants to be on bc.

## 2024-11-25 ENCOUNTER — OFFICE VISIT (OUTPATIENT)
Dept: OBGYN CLINIC | Facility: CLINIC | Age: 24
End: 2024-11-25
Payer: COMMERCIAL

## 2024-11-25 VITALS — DIASTOLIC BLOOD PRESSURE: 70 MMHG | SYSTOLIC BLOOD PRESSURE: 100 MMHG | BODY MASS INDEX: 43.75 KG/M2 | WEIGHT: 247 LBS

## 2024-11-25 DIAGNOSIS — Z30.431 IUD CHECK UP: Primary | ICD-10-CM

## 2024-11-25 DIAGNOSIS — Z97.5 BREAKTHROUGH BLEEDING ASSOCIATED WITH INTRAUTERINE DEVICE (IUD): ICD-10-CM

## 2024-11-25 DIAGNOSIS — N92.1 BREAKTHROUGH BLEEDING ASSOCIATED WITH INTRAUTERINE DEVICE (IUD): ICD-10-CM

## 2024-11-25 PROCEDURE — 99212 OFFICE O/P EST SF 10 MIN: CPT | Performed by: NURSE PRACTITIONER

## 2024-11-25 RX ORDER — NORETHINDRONE ACETATE AND ETHINYL ESTRADIOL 1MG-20(21)
1 KIT ORAL DAILY
Qty: 84 TABLET | Refills: 0 | Status: SHIPPED | OUTPATIENT
Start: 2024-11-25

## 2024-11-25 NOTE — PROGRESS NOTES
Subjective      Nanette Camarillo is a 24 y.o. female who presents for IUD followup. A Mirena IUD was inserted on 10/15/2024. Irregular bleeding since insertion. She states she bled from 10/17 to 11/15 and only had 5 days without bleeding. She is aware that it would be expected. I explained that the bleeding that started last week could be her actual menses for November.     The patient is sexually active. Pertinent past medical history: none.    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       The following portions of the patient's history were reviewed and updated as appropriate: allergies, current medications, past family history, past medical history, past social history, past surgical history, and problem list.    Review of Systems  Pertinent items are noted in HPI.     Objective      /70   Wt 112 kg (247 lb)   LMP 2024   BMI 43.75 kg/m²     Physical Exam  Constitutional:       Appearance: Normal appearance.   Genitourinary:      Genitourinary Comments: IUD noted to be in proper position with bedside transabdominal US.        Right Labia: No rash, tenderness, lesions, skin changes or Bartholin's cyst.     Left Labia: No tenderness, lesions, skin changes, Bartholin's cyst or rash.     No labial fusion noted.      Vaginal bleeding (currently on menses) present.      No vaginal discharge or erythema.      No vaginal prolapse present.     No vaginal atrophy present.     No cervical discharge, friability, lesion, polyp or nabothian cyst.      IUD strings visualized.   HENT:      Head: Normocephalic and atraumatic.   Musculoskeletal:      Cervical back: Neck  supple.   Neurological:      Mental Status: She is alert.   Skin:     General: Skin is warm.   Vitals and nursing note reviewed.       Assessment and Plan    Nanette was seen today for contraception.    Diagnoses and all orders for this visit:    IUD check up    Breakthrough bleeding associated with intrauterine device (IUD)  -     norethindrone-ethinyl estradiol (Loestrin Fe 1/20) 1-20 MG-MCG per tablet; Take 1 tablet by mouth daily      If bleeding does not stop by Sunday 11/30, an OCP pack has been prescribed to help regulate the cycle. Take for one monthly only.    She will provided update via [facility].     All questions and concerns addressed and patient verbalized understanding.

## (undated) DEVICE — PVC URETHRAL CATHETER: Brand: DOVER

## (undated) DEVICE — PREMIUM DRY TRAY LF: Brand: MEDLINE INDUSTRIES, INC.

## (undated) DEVICE — CURETTE VACURETTE CRVD 16MM

## (undated) DEVICE — D + E SAFE TOUCH TISSUE TRAP (CIRCON)

## (undated) DEVICE — D + E CONNECTION HOSE

## (undated) DEVICE — STRL ALLENTOWN HYSTEROSCOPY PK: Brand: CARDINAL HEALTH

## (undated) DEVICE — SPONGE LAP 18 X 18 IN STRL RFD

## (undated) DEVICE — COLLECTION SET, DISPOSABLE WITH HANDLE AND TAPERED FITTINGS TUBING, 6 FT (183 CM): Brand: GYRUS ACMI

## (undated) DEVICE — CHLORHEXIDINE 4PCT 4 OZ

## (undated) DEVICE — STERILE SURGICAL LUBRICANT,  TUBE: Brand: SURGILUBE

## (undated) DEVICE — D + E SUCTION CANISTER